# Patient Record
Sex: MALE | Race: WHITE | Employment: FULL TIME | URBAN - METROPOLITAN AREA
[De-identification: names, ages, dates, MRNs, and addresses within clinical notes are randomized per-mention and may not be internally consistent; named-entity substitution may affect disease eponyms.]

---

## 2017-04-17 ENCOUNTER — OFFICE VISIT (OUTPATIENT)
Dept: INTERNAL MEDICINE CLINIC | Age: 57
End: 2017-04-17

## 2017-04-17 VITALS
DIASTOLIC BLOOD PRESSURE: 82 MMHG | OXYGEN SATURATION: 97 % | HEIGHT: 72 IN | RESPIRATION RATE: 16 BRPM | HEART RATE: 62 BPM | SYSTOLIC BLOOD PRESSURE: 143 MMHG | WEIGHT: 296.6 LBS | TEMPERATURE: 96 F | BODY MASS INDEX: 40.17 KG/M2

## 2017-04-17 DIAGNOSIS — Z12.11 SCREENING FOR COLON CANCER: ICD-10-CM

## 2017-04-17 DIAGNOSIS — Z00.00 WELL ADULT ON ROUTINE HEALTH CHECK: Primary | ICD-10-CM

## 2017-04-17 PROBLEM — G47.30 SLEEP APNEA IN ADULT: Status: ACTIVE | Noted: 2017-04-17

## 2017-04-17 NOTE — PROGRESS NOTES
Subjective:   Robert Moulton is a 65 y.o. male presenting as a new patient for his annual checkup. No significant past medical history. He is on CPAP for a while. ROS:  Feeling well. No dyspnea or chest pain on exertion. No abdominal pain, change in bowel habits, black or bloody stools. No urinary tract or prostatic symptoms. No neurological complaints. Specific concerns today: none. .    Patient Active Problem List   Diagnosis Code    Sleep apnea in adult G47.30       No Known Allergies  History reviewed. No pertinent past medical history. Past Surgical History:   Procedure Laterality Date    HX APPENDECTOMY      HX HERNIA REPAIR      HX TONSILLECTOMY       Family History   Problem Relation Age of Onset    Lung Disease Mother     Diabetes Mother     Diabetes Father      Social History   Substance Use Topics    Smoking status: Never Smoker    Smokeless tobacco: Never Used    Alcohol use 0.6 oz/week     1 Shots of liquor per week             Objective:     Visit Vitals    /82 (BP 1 Location: Left arm, BP Patient Position: Sitting)    Pulse 62    Temp 96 °F (35.6 °C) (Oral)    Resp 16    Ht 6' (1.829 m)    Wt 296 lb 9.6 oz (134.5 kg)    SpO2 97%    BMI 40.23 kg/m2     The patient appears well, alert, oriented x 3, in no distress. ENT normal.  Neck supple. No adenopathy or thyromegaly. LIOR. Lungs are clear, good air entry, no wheezes, rhonchi or rales. S1 and S2 normal, no murmurs, regular rate and rhythm. Abdomen is soft without tenderness, guarding, mass or organomegaly.  exam: deferred. .  Extremities show no edema, normal peripheral pulses. Neurological is normal without focal findings. Assessment/Plan:   healthy adult male  lose weight, increase physical activity, follow low fat diet, follow low salt diet, continue present plan, routine labs ordered, call if any problems. ICD-10-CM ICD-9-CM    1.  Well adult on routine health check Z00.00 V70.0 PSA W/ REFLX FREE PSA UA/M W/RFLX CULTURE, COMP      CBC WITH AUTOMATED DIFF      HEMOGLOBIN A1C WITH EAG      TSH AND FREE T4      METABOLIC PANEL, COMPREHENSIVE      LIPID PANEL   2. Screening for colon cancer Z12.11 V76.51 REFERRAL FOR COLONOSCOPY     Encounter Diagnoses   Name Primary?  Well adult on routine health check Yes    Screening for colon cancer      Lizeth Manjarrez was seen today for complete physical.    Diagnoses and all orders for this visit:    Well adult on routine health check  -     PSA W/ REFLX FREE PSA  -     UA/M W/RFLX CULTURE, COMP  -     CBC WITH AUTOMATED DIFF  -     HEMOGLOBIN A1C WITH EAG  -     TSH AND FREE T4  -     METABOLIC PANEL, COMPREHENSIVE  -     LIPID PANEL    Screening for colon cancer  -     REFERRAL FOR COLONOSCOPY      Follow-up Disposition: Not on File  reviewed diet, exercise and weight control.

## 2017-04-18 LAB
ALBUMIN SERPL-MCNC: 4.6 G/DL (ref 3.5–5.5)
ALBUMIN/GLOB SERPL: 1.8 {RATIO} (ref 1.2–2.2)
ALP SERPL-CCNC: 66 IU/L (ref 39–117)
ALT SERPL-CCNC: 49 IU/L (ref 0–44)
APPEARANCE UR: CLEAR
AST SERPL-CCNC: 42 IU/L (ref 0–40)
BACTERIA #/AREA URNS HPF: NORMAL /[HPF]
BASOPHILS # BLD AUTO: 0 X10E3/UL (ref 0–0.2)
BASOPHILS NFR BLD AUTO: 0 %
BILIRUB SERPL-MCNC: 0.7 MG/DL (ref 0–1.2)
BILIRUB UR QL STRIP: NEGATIVE
BUN SERPL-MCNC: 13 MG/DL (ref 6–24)
BUN/CREAT SERPL: 14 (ref 9–20)
CALCIUM SERPL-MCNC: 9.5 MG/DL (ref 8.7–10.2)
CASTS URNS QL MICRO: NORMAL /LPF
CHLORIDE SERPL-SCNC: 99 MMOL/L (ref 96–106)
CHOLEST SERPL-MCNC: 194 MG/DL (ref 100–199)
CO2 SERPL-SCNC: 20 MMOL/L (ref 18–29)
COLOR UR: YELLOW
CREAT SERPL-MCNC: 0.91 MG/DL (ref 0.76–1.27)
EOSINOPHIL # BLD AUTO: 0.1 X10E3/UL (ref 0–0.4)
EOSINOPHIL NFR BLD AUTO: 2 %
EPI CELLS #/AREA URNS HPF: NORMAL /HPF
ERYTHROCYTE [DISTWIDTH] IN BLOOD BY AUTOMATED COUNT: 13.1 % (ref 12.3–15.4)
EST. AVERAGE GLUCOSE BLD GHB EST-MCNC: 126 MG/DL
GLOBULIN SER CALC-MCNC: 2.5 G/DL (ref 1.5–4.5)
GLUCOSE SERPL-MCNC: 95 MG/DL (ref 65–99)
GLUCOSE UR QL: NEGATIVE
HBA1C MFR BLD: 6 % (ref 4.8–5.6)
HCT VFR BLD AUTO: 43.2 % (ref 37.5–51)
HDLC SERPL-MCNC: 40 MG/DL
HGB BLD-MCNC: 14.8 G/DL (ref 12.6–17.7)
HGB UR QL STRIP: NEGATIVE
IMM GRANULOCYTES # BLD: 0 X10E3/UL (ref 0–0.1)
IMM GRANULOCYTES NFR BLD: 0 %
INTERPRETATION, 910389: NORMAL
KETONES UR QL STRIP: NEGATIVE
LDLC SERPL CALC-MCNC: 110 MG/DL (ref 0–99)
LEUKOCYTE ESTERASE UR QL STRIP: NEGATIVE
LYMPHOCYTES # BLD AUTO: 2.1 X10E3/UL (ref 0.7–3.1)
LYMPHOCYTES NFR BLD AUTO: 33 %
MCH RBC QN AUTO: 31.6 PG (ref 26.6–33)
MCHC RBC AUTO-ENTMCNC: 34.3 G/DL (ref 31.5–35.7)
MCV RBC AUTO: 92 FL (ref 79–97)
MICRO URNS: NORMAL
MICRO URNS: NORMAL
MONOCYTES # BLD AUTO: 0.3 X10E3/UL (ref 0.1–0.9)
MONOCYTES NFR BLD AUTO: 5 %
NEUTROPHILS # BLD AUTO: 3.8 X10E3/UL (ref 1.4–7)
NEUTROPHILS NFR BLD AUTO: 60 %
NITRITE UR QL STRIP: NEGATIVE
PH UR STRIP: 6 [PH] (ref 5–7.5)
PLATELET # BLD AUTO: 170 X10E3/UL (ref 150–379)
POTASSIUM SERPL-SCNC: 4.4 MMOL/L (ref 3.5–5.2)
PROT SERPL-MCNC: 7.1 G/DL (ref 6–8.5)
PROT UR QL STRIP: NEGATIVE
PSA SERPL-MCNC: 1.9 NG/ML (ref 0–4)
RBC # BLD AUTO: 4.68 X10E6/UL (ref 4.14–5.8)
RBC #/AREA URNS HPF: NORMAL /HPF
REFLEX CRITERIA: NORMAL
SODIUM SERPL-SCNC: 141 MMOL/L (ref 134–144)
SP GR UR: 1 (ref 1–1.03)
T4 FREE SERPL-MCNC: 1.03 NG/DL (ref 0.82–1.77)
TRIGL SERPL-MCNC: 222 MG/DL (ref 0–149)
TSH SERPL DL<=0.005 MIU/L-ACNC: 1.78 UIU/ML (ref 0.45–4.5)
URINALYSIS REFLEX , 377201: NORMAL
UROBILINOGEN UR STRIP-MCNC: 0.2 MG/DL (ref 0.2–1)
VLDLC SERPL CALC-MCNC: 44 MG/DL (ref 5–40)
WBC # BLD AUTO: 6.4 X10E3/UL (ref 3.4–10.8)
WBC #/AREA URNS HPF: NORMAL /HPF

## 2017-04-20 NOTE — PROGRESS NOTES
Please mail the letter:     1. Prostate enzyme, CBC, kidney,  thyroid, UA level is within normal range. 2. Liver enzymes are slightly elevated, not concerning. Please work on exercise and loosing weight. 3. Total cholesterol level is normal but the bad cholesterol level and triglyceride level mildly elevated. No need to start any medication at this point. Continue watching your diet, eat healthy, less bradley and fatty food, more baked or grilled or broiled food. Exercise 150 minutes/week will be helpful as well. Will recheck level in 6 months. 4. Prediabetes: Average blood sugar( Hg A1c ) is 6.0, in the range of prediabetic level. Prediabetes is a warning sign that you are at risk for getting type 2 diabetes. It means that your blood sugar is higher than it should be. Most people who get type 2 diabetes have prediabetes first. The good news is that lifestyle changes may help you get your blood sugar back to normal and avoid or delay diabetes.   Will recheck this level in 6 months

## 2018-03-02 ENCOUNTER — OFFICE VISIT (OUTPATIENT)
Dept: INTERNAL MEDICINE CLINIC | Age: 58
End: 2018-03-02

## 2018-03-02 VITALS
HEART RATE: 71 BPM | HEIGHT: 72 IN | WEIGHT: 305.8 LBS | SYSTOLIC BLOOD PRESSURE: 120 MMHG | DIASTOLIC BLOOD PRESSURE: 77 MMHG | TEMPERATURE: 96.3 F | BODY MASS INDEX: 41.42 KG/M2 | RESPIRATION RATE: 18 BRPM | OXYGEN SATURATION: 97 %

## 2018-03-02 DIAGNOSIS — E66.01 OBESITY, MORBID (HCC): ICD-10-CM

## 2018-03-02 DIAGNOSIS — Z71.89 ADVANCE DIRECTIVE DISCUSSED WITH PATIENT: ICD-10-CM

## 2018-03-02 DIAGNOSIS — G47.30 SLEEP APNEA IN ADULT: ICD-10-CM

## 2018-03-02 DIAGNOSIS — R74.8 ELEVATED LIVER ENZYMES: ICD-10-CM

## 2018-03-02 DIAGNOSIS — J34.2 DNS (DEVIATED NASAL SEPTUM): ICD-10-CM

## 2018-03-02 DIAGNOSIS — R73.03 PREDIABETES: Primary | ICD-10-CM

## 2018-03-02 NOTE — PATIENT INSTRUCTIONS
Body Mass Index: Care Instructions  Your Care Instructions    Body mass index (BMI) can help you see if your weight is raising your risk for health problems. It uses a formula to compare how much you weigh with how tall you are. · A BMI lower than 18.5 is considered underweight. · A BMI between 18.5 and 24.9 is considered healthy. · A BMI between 25 and 29.9 is considered overweight. A BMI of 30 or higher is considered obese. If your BMI is in the normal range, it means that you have a lower risk for weight-related health problems. If your BMI is in the overweight or obese range, you may be at increased risk for weight-related health problems, such as high blood pressure, heart disease, stroke, arthritis or joint pain, and diabetes. If your BMI is in the underweight range, you may be at increased risk for health problems such as fatigue, lower protection (immunity) against illness, muscle loss, bone loss, hair loss, and hormone problems. BMI is just one measure of your risk for weight-related health problems. You may be at higher risk for health problems if you are not active, you eat an unhealthy diet, or you drink too much alcohol or use tobacco products. Follow-up care is a key part of your treatment and safety. Be sure to make and go to all appointments, and call your doctor if you are having problems. It's also a good idea to know your test results and keep a list of the medicines you take. How can you care for yourself at home? · Practice healthy eating habits. This includes eating plenty of fruits, vegetables, whole grains, lean protein, and low-fat dairy. · If your doctor recommends it, get more exercise. Walking is a good choice. Bit by bit, increase the amount you walk every day. Try for at least 30 minutes on most days of the week. · Do not smoke. Smoking can increase your risk for health problems. If you need help quitting, talk to your doctor about stop-smoking programs and medicines. These can increase your chances of quitting for good. · Limit alcohol to 2 drinks a day for men and 1 drink a day for women. Too much alcohol can cause health problems. If you have a BMI higher than 25  · Your doctor may do other tests to check your risk for weight-related health problems. This may include measuring the distance around your waist. A waist measurement of more than 40 inches in men or 35 inches in women can increase the risk of weight-related health problems. · Talk with your doctor about steps you can take to stay healthy or improve your health. You may need to make lifestyle changes to lose weight and stay healthy, such as changing your diet and getting regular exercise. If you have a BMI lower than 18.5  · Your doctor may do other tests to check your risk for health problems. · Talk with your doctor about steps you can take to stay healthy or improve your health. You may need to make lifestyle changes to gain or maintain weight and stay healthy, such as getting more healthy foods in your diet and doing exercises to build muscle. Where can you learn more? Go to http://dany-miah.info/. Enter S176 in the search box to learn more about \"Body Mass Index: Care Instructions. \"  Current as of: October 13, 2016  Content Version: 11.4  © 8913-5498 [employer]. Care instructions adapted under license by [employer] (which disclaims liability or warranty for this information). If you have questions about a medical condition or this instruction, always ask your healthcare professional. [address] any warranty or liability for your use of this information. Body Mass Index: Care Instructions  Your Care Instructions    Body mass index (BMI) can help you see if your weight is raising your risk for health problems. It uses a formula to compare how much you weigh with how tall you are.   · A BMI lower than 18.5 is considered underweight. · A BMI between 18.5 and 24.9 is considered healthy. · A BMI between 25 and 29.9 is considered overweight. A BMI of 30 or higher is considered obese. If your BMI is in the normal range, it means that you have a lower risk for weight-related health problems. If your BMI is in the overweight or obese range, you may be at increased risk for weight-related health problems, such as high blood pressure, heart disease, stroke, arthritis or joint pain, and diabetes. If your BMI is in the underweight range, you may be at increased risk for health problems such as fatigue, lower protection (immunity) against illness, muscle loss, bone loss, hair loss, and hormone problems. BMI is just one measure of your risk for weight-related health problems. You may be at higher risk for health problems if you are not active, you eat an unhealthy diet, or you drink too much alcohol or use tobacco products. Follow-up care is a key part of your treatment and safety. Be sure to make and go to all appointments, and call your doctor if you are having problems. It's also a good idea to know your test results and keep a list of the medicines you take. How can you care for yourself at home? · Practice healthy eating habits. This includes eating plenty of fruits, vegetables, whole grains, lean protein, and low-fat dairy. · If your doctor recommends it, get more exercise. Walking is a good choice. Bit by bit, increase the amount you walk every day. Try for at least 30 minutes on most days of the week. · Do not smoke. Smoking can increase your risk for health problems. If you need help quitting, talk to your doctor about stop-smoking programs and medicines. These can increase your chances of quitting for good. · Limit alcohol to 2 drinks a day for men and 1 drink a day for women. Too much alcohol can cause health problems.   If you have a BMI higher than 25  · Your doctor may do other tests to check your risk for weight-related health problems. This may include measuring the distance around your waist. A waist measurement of more than 40 inches in men or 35 inches in women can increase the risk of weight-related health problems. · Talk with your doctor about steps you can take to stay healthy or improve your health. You may need to make lifestyle changes to lose weight and stay healthy, such as changing your diet and getting regular exercise. If you have a BMI lower than 18.5  · Your doctor may do other tests to check your risk for health problems. · Talk with your doctor about steps you can take to stay healthy or improve your health. You may need to make lifestyle changes to gain or maintain weight and stay healthy, such as getting more healthy foods in your diet and doing exercises to build muscle. Where can you learn more? Go to http://dany-miah.info/. Enter S176 in the search box to learn more about \"Body Mass Index: Care Instructions. \"  Current as of: October 13, 2016  Content Version: 11.4  © 0246-5903 [employer]. Care instructions adapted under license by [employer] (which disclaims liability or warranty for this information). If you have questions about a medical condition or this instruction, always ask your healthcare professional. [address] any warranty or liability for your use of this information.

## 2018-03-02 NOTE — LETTER
3/5/2018 3:56 PM 
 
Mr. Flora Rosa  [address] [place] [address] Dear Flora Sullivan: Please find your most recent results below. Resulted Orders HEMOGLOBIN A1C WITH EAG Result Value Ref Range Hemoglobin A1c 6.1 (H) 4.8 - 5.6 % Comment:  
            Pre-diabetes: 5.7 - 6.4 Diabetes: >6.4 Glycemic control for adults with diabetes: <7.0 Estimated average glucose 128 mg/dL Narrative Performed at:  [address]  [id number] : Aylin Samayoa MD, Phone:  [phone] HEPATIC FUNCTION PANEL Result Value Ref Range Protein, total 7.0 6.0 - 8.5 g/dL Albumin 4.3 3.5 - 5.5 g/dL Bilirubin, total 0.5 0.0 - 1.2 mg/dL Bilirubin, direct 0.15 0.00 - 0.40 mg/dL Alk. phosphatase 74 39 - 117 IU/L  
 AST (SGOT) 31 0 - 40 IU/L  
 ALT (SGPT) 39 0 - 44 IU/L Narrative Performed at:  [address]  [id number] : Aylin Samayoa MD, Phone:  [phone] RECOMMENDATIONS: 
 
1. Liver function is back to normal.  
 
2. Average blood sugar level is slightly up from last time. Continue work on diet and exercise as we have discussed Please call me if you have any questions: [phone] Sincerely, 
 
 
Denise Galindo MD

## 2018-03-02 NOTE — PROGRESS NOTES
Subjective:     Chief Complaint   Patient presents with    Cholesterol Problem    High Blood Sugar        He  is a 63y.o. year old male with no significant past medical history except sleep apnea who presents today for follow up on blood work done last year in 4/2017. Last a1c was 6.0. Cholesterol level was slightly elevated . Unfortunately he gained 9 pounds in 11 months. States that because of his work schedule and as  its very difficult to follow healthy lifestyle. Has been using CPAP on a regular basis. Denies any chest pain, soa, cough. Reports that he is a mouth breather. Hard to sleep through nose. Pertinent items are noted in HPI. Objective:     Vitals:    03/02/18 0815   BP: 120/77   Pulse: 71   Resp: 18   Temp: 96.3 °F (35.7 °C)   TempSrc: Temporal   SpO2: 97%   Weight: 305 lb 12.8 oz (138.7 kg)   Height: 6' (1.829 m)       Physical Examination: General appearance - alert, well appearing, and in no distress, oriented to person, place, and time and overweight  Mental status - alert, oriented to person, place, and time, normal mood, behavior, speech, dress, motor activity, and thought processes  Ears - bilateral TM's and external ear canals normal  Nose - normal and patent, no erythema, discharge or polyps and septal deviation to left.    Mouth - mucous membranes moist, pharynx normal without lesions  Neck - supple, no significant adenopathy  Chest - clear to auscultation, no wheezes, rales or rhonchi, symmetric air entry  Heart - normal rate, regular rhythm, normal S1, S2, no murmurs, rubs, clicks or gallops    No Known Allergies   Social History     Social History    Marital status:      Spouse name: N/A    Number of children: N/A    Years of education: N/A     Social History Main Topics    Smoking status: Never Smoker    Smokeless tobacco: Never Used    Alcohol use 0.6 oz/week     1 Shots of liquor per week    Drug use: No    Sexual activity: Not Asked     Other Topics Concern    None     Social History Narrative      Family History   Problem Relation Age of Onset    Lung Disease Mother     Diabetes Mother     Diabetes Father       Past Surgical History:   Procedure Laterality Date    HX APPENDECTOMY      HX HERNIA REPAIR      HX TONSILLECTOMY        History reviewed. No pertinent past medical history. Current Outpatient Prescriptions   Medication Sig Dispense Refill    DM/PSEUDOEPHED/ACETAMINOPHEN (VICKS DAYQUIL PO) Take  by mouth. Assessment/ Plan:   Diagnoses and all orders for this visit:    1. Prediabetes  -     HEMOGLOBIN A1C WITH EAG        - strongly encouraged to work on healthy diet and exercise. 2. Elevated liver enzymes  -     HEPATIC FUNCTION PANEL    3. Obesity, morbid ([address]  Discussed the patient's BMI with him. The BMI follow up plan is as follows:     dietary management education, guidance, and counseling  encourage exercise  monitor weight  prescribed dietary intake      4. DNS (deviated nasal septum)  -   Left nostril is barely open. Advised to have a follow up with ENT. -    REFERRAL TO ENT-OTOLARYNGOLOGY    5. Sleep apnea in adult  -     REFERRAL TO ENT-OTOLARYNGOLOGY    6. Advance directive discussed with patient  -     Refer to ACP. -    FULL CODE           Medication risks/benefits/costs/interactions/alternatives discussed with patient. Advised patient to call back or return to office if symptoms worsen/change/persist. If patient cannot reach us or should anything more severe/urgent arise he/she should proceed directly to the nearest emergency department. Discussed expected course/resolution/complications of diagnosis in detail with patient. Patient given a written after visit summary which includes her diagnoses, current medications and vitals. Patient expressed understanding with the diagnosis and plan.        Follow-up Disposition:  Return in about 7 weeks (around 4/20/2018) for complete physical  and fasting blood work.. An After Visit Summary was printed and given to the patient.

## 2018-03-02 NOTE — ACP (ADVANCE CARE PLANNING)
Advance Care Planning (ACP) Provider Conversation Snapshot    Date of ACP Conversation: 03/02/18  Persons included in Conversation:  patient  Length of ACP Conversation in minutes:  <16 minutes (Non-Billable)    Authorized Decision Maker (if patient is incapable of making informed decisions): This person is: Other Legally Authorized Decision Maker (e.g. Next of Kin)- His wife. For Patients with Decision Making Capacity:   Values/Goals: Exploration of values, goals, and preferences if recovery is not expected, even with continued medical treatment in the event of:  Imminent death  Severe, permanent brain injury  \"In these circumstances, what matters most to you? \"  Care focused more on comfort or quality of life. Conversation Outcomes / Follow-Up Plan:   Recommended completion of Advance Directive form after review of ACP materials and conversation with prospective healthcare agent   Recommended communicating the plan and making copies for the healthcare agent, personal physician, and others as appropriate (e.g., health system)  Recommended review of completed ACP document annually or upon change in health status  Other Treatment or Goals of Care Decisions:    Patient is full code but if there is imminent death or permanent brain injury he wish not to be in life support at all.

## 2018-03-02 NOTE — MR AVS SNAPSHOT
[facility]. Janae Ambrose 26 [facility] 13 
[phone] Patient: Abby Avendaño MRN: [id number] Visit Information Date & Time Provider Department Dept. Phone Encounter #  
 3/2/2018  8:30 AM Juan Jose Carbajal MD [facility] [phone] [id number] Follow-up Instructions Return in about 7 weeks (around 4/20/2018) for complete physical  and fasting blood work. Alma Rojas Upcoming Health Maintenance Date Due Hepatitis C Screening 1960 DTaP/Tdap/Td series (1 - Tdap) 1/20/1982 Influenza Age 5 to Adult 8/1/2017 COLONOSCOPY 11/15/2027 Allergies as of 3/2/2018  Review Complete On: 3/2/2018 By: Juan Jose Carbajal MD  
 No Known Allergies Current Immunizations  Never Reviewed No immunizations on file. Not reviewed this visit You Were Diagnosed With   
  
 Codes Comments Prediabetes    -  Primary ICD-10-CM: R73.03 
ICD-9-CM: 790.29 Elevated liver enzymes     ICD-10-CM: R74.8 ICD-9-CM: 790.5 Obesity, morbid ([address]     ICD-10-CM: E66.01 
ICD-9-CM: 278.01   
 DNS (deviated nasal septum)     ICD-10-CM: J34.2 ICD-9-CM: 875 Sleep apnea in adult     ICD-10-CM: G47.30 ICD-9-CM: 327.23 Vitals BP Pulse Temp Resp Height(growth percentile) 120/77 (BP 1 Location: Left arm, BP Patient Position: Sitting) 71 96.3 °F (35.7 °C) (Temporal) 18 6' (1.829 m) Weight(growth percentile) SpO2 BMI Smoking Status 305 lb 12.8 oz (138.7 kg) 97% 41.47 kg/m2 Never Smoker Vitals History BMI and BSA Data Body Mass Index Body Surface Area  
 41.47 kg/m 2 2.65 m 2 Preferred Pharmacy Pharmacy Name Phone [address] [phone] Your Updated Medication List  
  
   
This list is accurate as of 3/2/18  8:43 AM.  Always use your most recent med list.  
  
  
  
  
 Gus Perkins PO Take  by mouth. We Performed the Following HEMOGLOBIN A1C WITH EAG [86835 CPT(R)] HEPATIC FUNCTION PANEL [28810 CPT(R)] REFERRAL TO ENT-OTOLARYNGOLOGY [GSU60 Custom] Follow-up Instructions Return in about 7 weeks (around 4/20/2018) for complete physical  and fasting blood work. Suresh Spence Referral Information Referral ID Referred By Referred To  
  
 [id number] KADIE CARRINGTON [facility] Elena Parra [address] [place] [address] Fax: [phone] Visits Status Start Date End Date 1 New Request 3/2/18 3/2/19 If your referral has a status of pending review or denied, additional information will be sent to support the outcome of this decision. Patient Instructions Body Mass Index: Care Instructions Your Care Instructions Body mass index (BMI) can help you see if your weight is raising your risk for health problems. It uses a formula to compare how much you weigh with how tall you are. · A BMI lower than 18.5 is considered underweight. · A BMI between 18.5 and 24.9 is considered healthy. · A BMI between 25 and 29.9 is considered overweight. A BMI of 30 or higher is considered obese. If your BMI is in the normal range, it means that you have a lower risk for weight-related health problems. If your BMI is in the overweight or obese range, you may be at increased risk for weight-related health problems, such as high blood pressure, heart disease, stroke, arthritis or joint pain, and diabetes. If your BMI is in the underweight range, you may be at increased risk for health problems such as fatigue, lower protection (immunity) against illness, muscle loss, bone loss, hair loss, and hormone problems. BMI is just one measure of your risk for weight-related health problems. You may be at higher risk for health problems if you are not active, you eat an unhealthy diet, or you drink too much alcohol or use tobacco products. Follow-up care is a key part of your treatment and safety. Be sure to make and go to all appointments, and call your doctor if you are having problems. It's also a good idea to know your test results and keep a list of the medicines you take. How can you care for yourself at home? · Practice healthy eating habits. This includes eating plenty of fruits, vegetables, whole grains, lean protein, and low-fat dairy. · If your doctor recommends it, get more exercise. Walking is a good choice. Bit by bit, increase the amount you walk every day. Try for at least 30 minutes on most days of the week. · Do not smoke. Smoking can increase your risk for health problems. If you need help quitting, talk to your doctor about stop-smoking programs and medicines. These can increase your chances of quitting for good. · Limit alcohol to 2 drinks a day for men and 1 drink a day for women. Too much alcohol can cause health problems. If you have a BMI higher than 25 · Your doctor may do other tests to check your risk for weight-related health problems. This may include measuring the distance around your waist. A waist measurement of more than 40 inches in men or 35 inches in women can increase the risk of weight-related health problems. · Talk with your doctor about steps you can take to stay healthy or improve your health. You may need to make lifestyle changes to lose weight and stay healthy, such as changing your diet and getting regular exercise. If you have a BMI lower than 18.5 · Your doctor may do other tests to check your risk for health problems. · Talk with your doctor about steps you can take to stay healthy or improve your health. You may need to make lifestyle changes to gain or maintain weight and stay healthy, such as getting more healthy foods in your diet and doing exercises to build muscle. Where can you learn more? Go to http://dany-miah.info/. Enter S176 in the search box to learn more about \"Body Mass Index: Care Instructions. \" Current as of: October 13, 2016 Content Version: 11.4 © 9509-3285 [employer]. Care instructions adapted under license by [employer] (which disclaims liability or warranty for this information). If you have questions about a medical condition or this instruction, always ask your healthcare professional. [facility] [address] any warranty or liability for your use of this information. Body Mass Index: Care Instructions Your Care Instructions Body mass index (BMI) can help you see if your weight is raising your risk for health problems. It uses a formula to compare how much you weigh with how tall you are. · A BMI lower than 18.5 is considered underweight. · A BMI between 18.5 and 24.9 is considered healthy. · A BMI between 25 and 29.9 is considered overweight. A BMI of 30 or higher is considered obese. If your BMI is in the normal range, it means that you have a lower risk for weight-related health problems. If your BMI is in the overweight or obese range, you may be at increased risk for weight-related health problems, such as high blood pressure, heart disease, stroke, arthritis or joint pain, and diabetes. If your BMI is in the underweight range, you may be at increased risk for health problems such as fatigue, lower protection (immunity) against illness, muscle loss, bone loss, hair loss, and hormone problems. BMI is just one measure of your risk for weight-related health problems. You may be at higher risk for health problems if you are not active, you eat an unhealthy diet, or you drink too much alcohol or use tobacco products. Follow-up care is a key part of your treatment and safety. Be sure to make and go to all appointments, and call your doctor if you are having problems. It's also a good idea to know your test results and keep a list of the medicines you take. How can you care for yourself at home? · Practice healthy eating habits. This includes eating plenty of fruits, vegetables, whole grains, lean protein, and low-fat dairy. · If your doctor recommends it, get more exercise. Walking is a good choice. Bit by bit, increase the amount you walk every day. Try for at least 30 minutes on most days of the week. · Do not smoke. Smoking can increase your risk for health problems. If you need help quitting, talk to your doctor about stop-smoking programs and medicines. These can increase your chances of quitting for good. · Limit alcohol to 2 drinks a day for men and 1 drink a day for women. Too much alcohol can cause health problems. If you have a BMI higher than 25 · Your doctor may do other tests to check your risk for weight-related health problems. This may include measuring the distance around your waist. A waist measurement of more than 40 inches in men or 35 inches in women can increase the risk of weight-related health problems. · Talk with your doctor about steps you can take to stay healthy or improve your health. You may need to make lifestyle changes to lose weight and stay healthy, such as changing your diet and getting regular exercise. If you have a BMI lower than 18.5 · Your doctor may do other tests to check your risk for health problems. · Talk with your doctor about steps you can take to stay healthy or improve your health. You may need to make lifestyle changes to gain or maintain weight and stay healthy, such as getting more healthy foods in your diet and doing exercises to build muscle. Where can you learn more? Go to http://dany-miah.info/. Enter S176 in the search box to learn more about \"Body Mass Index: Care Instructions. \" Current as of: October 13, 2016 Content Version: 11.4 © 9270-5443 [employer].  Care instructions adapted under license by [address] (which disclaims liability or warranty for this information). If you have questions about a medical condition or this instruction, always ask your healthcare professional. [address] any warranty or liability for your use of this information. Introducing hospitals & HEALTH SERVICES! Elsie Velazquezchrist introduces [employer] patient portal. Now you can access parts of your medical record, email your doctor's office, and request medication refills online. 1. In your internet browser, go to [email or web address] 2. Click on the First Time User? Click Here link in the Sign In box. You will see the New Member Sign Up page. 3. Enter your [employer] Access Code exactly as it appears below. You will not need to use this code after youve completed the sign-up process. If you do not sign up before the expiration date, you must request a new code. · [employer] Access Code: [email or web address] Expires: 5/31/2018  8:15 AM 
 
4. Enter the last four digits of your Social Security Number (xxxx) and Date of Birth (mm/dd/yyyy) as indicated and click Submit. You will be taken to the next sign-up page. 5. Create a [employer] ID. This will be your [employer] login ID and cannot be changed, so think of one that is secure and easy to remember. 6. Create a [employer] password. You can change your password at any time. 7. Enter your Password Reset Question and Answer. This can be used at a later time if you forget your password. 8. Enter your e-mail address. You will receive e-mail notification when new information is available in [address] 9. Click Sign Up. You can now view and download portions of your medical record. 10. Click the Download Summary menu link to download a portable copy of your medical information. If you have questions, please visit the Frequently Asked Questions section of the [employer] website.  Remember, [employer] is NOT to be used for urgent needs. For medical emergencies, dial 911. Now available from your iPhone and Android! Please provide this summary of care documentation to your next provider. If you have any questions after today's visit, please call [phone].

## 2018-03-03 LAB
ALBUMIN SERPL-MCNC: 4.3 G/DL (ref 3.5–5.5)
ALP SERPL-CCNC: 74 IU/L (ref 39–117)
ALT SERPL-CCNC: 39 IU/L (ref 0–44)
AST SERPL-CCNC: 31 IU/L (ref 0–40)
BILIRUB DIRECT SERPL-MCNC: 0.15 MG/DL (ref 0–0.4)
BILIRUB SERPL-MCNC: 0.5 MG/DL (ref 0–1.2)
EST. AVERAGE GLUCOSE BLD GHB EST-MCNC: 128 MG/DL
HBA1C MFR BLD: 6.1 % (ref 4.8–5.6)
PROT SERPL-MCNC: 7 G/DL (ref 6–8.5)

## 2018-03-04 NOTE — ACP (ADVANCE CARE PLANNING)
Advance Care Planning (ACP) Provider Conversation Snapshot    Date of ACP Conversation: 03/02/18  Persons included in Conversation:  patient  Length of ACP Conversation in minutes:  <16 minutes (Non-Billable)    Authorized Decision Maker (if patient is incapable of making informed decisions): This person is:   his wife          For Patients with Decision Making Capacity:   Values/Goals: Exploration of values, goals, and preferences if recovery is not expected, even with continued medical treatment in the event of:  Imminent death  Severe, permanent brain injury  \"In these circumstances, what matters most to you? \"  Care focused more on comfort or quality of life. Conversation Outcomes / Follow-Up Plan:   Recommended completion of Advance Directive form after review of ACP materials and conversation with prospective healthcare agent   Recommended communicating the plan and making copies for the healthcare agent, personal physician, and others as appropriate (e.g., health system)  Recommended review of completed ACP document annually or upon change in health status    He clearly states that if there is imminent death and  permanent brain damage he does not want to start any life support.

## 2018-03-05 NOTE — PROGRESS NOTES
Please mail letter:    1. Liver function is back to normal.    2. Average blood sugar level is slightly up from last time. Continue work on diet and exercise as we have discussed.

## 2018-04-18 ENCOUNTER — OFFICE VISIT (OUTPATIENT)
Dept: INTERNAL MEDICINE CLINIC | Age: 58
End: 2018-04-18

## 2018-04-18 VITALS
BODY MASS INDEX: 41.34 KG/M2 | HEIGHT: 72 IN | TEMPERATURE: 96.8 F | RESPIRATION RATE: 18 BRPM | OXYGEN SATURATION: 99 % | DIASTOLIC BLOOD PRESSURE: 79 MMHG | SYSTOLIC BLOOD PRESSURE: 128 MMHG | HEART RATE: 63 BPM | WEIGHT: 305.2 LBS

## 2018-04-18 DIAGNOSIS — G47.30 SLEEP APNEA IN ADULT: ICD-10-CM

## 2018-04-18 DIAGNOSIS — E66.01 OBESITY, MORBID (HCC): ICD-10-CM

## 2018-04-18 DIAGNOSIS — Z00.00 WELL ADULT ON ROUTINE HEALTH CHECK: Primary | ICD-10-CM

## 2018-04-18 DIAGNOSIS — Z11.59 ENCOUNTER FOR HEPATITIS C SCREENING TEST FOR LOW RISK PATIENT: ICD-10-CM

## 2018-04-18 RX ORDER — BISMUTH SUBSALICYLATE 262 MG
1 TABLET,CHEWABLE ORAL DAILY
COMMUNITY

## 2018-04-18 NOTE — PROGRESS NOTES
Subjective:   Ivan Polo is a 63 y.o. male presenting for his annual checkup. Has been using CPAP regularly. ROS:  Feeling well. No dyspnea or chest pain on exertion. No abdominal pain, change in bowel habits, black or bloody stools. No urinary tract or prostatic symptoms. No neurological complaints. Specific concerns today: none. Patient Active Problem List   Diagnosis Code    Sleep apnea in adult G47.30    [facility] E66.01     Current Outpatient Prescriptions   Medication Sig Dispense Refill    multivitamin (ONE A DAY) tablet Take 1 Tab by mouth daily. No Known Allergies  No past medical history on file. Social History   Substance Use Topics    Smoking status: Never Smoker    Smokeless tobacco: Never Used    Alcohol use 0.6 oz/week     1 Shots of liquor per week             Objective:     Visit Vitals    /79 (BP 1 Location: Left arm, BP Patient Position: Sitting)    Pulse 63    Temp 96.8 °F (36 °C) (Temporal)    Resp 18    Ht 6' (1.829 m)    Wt 305 lb 3.2 oz (138.4 kg)    SpO2 99%    BMI 41.39 kg/m2     The patient appears well, alert, oriented x 3, in no distress. ENT normal.  Neck supple. No adenopathy or thyromegaly. LIOR. Lungs are clear, good air entry, no wheezes, rhonchi or rales. S1 and S2 normal, no murmurs, regular rate and rhythm. Abdomen is soft without tenderness, guarding, mass or organomegaly.  exam: deferred. Extremities show no edema, normal peripheral pulses. Neurological is normal without focal findings. Assessment/Plan:   healthy adult male  lose weight, increase physical activity, follow low fat diet, follow low salt diet, routine labs ordered, call if any problems. ICD-10-CM ICD-9-CM    1.  Well adult on routine health check Z00.00 V70.0 multivitamin (ONE A DAY) tablet      CBC WITH AUTOMATED DIFF      METABOLIC PANEL, COMPREHENSIVE      TSH AND FREE T4      LIPID PANEL      PSA W/ REFLX FREE PSA      HEMOGLOBIN A1C WITH EAG 2. Sleep apnea in adult G47.30 327.23    3. Obesity, morbid ([facility] E66.01 278.01    4. Encounter for hepatitis C screening test for low risk patient Z11.59 V73.89 HEPATITIS C AB     Diagnoses and all orders for this visit:    1. Well adult on routine health check  -     CBC WITH AUTOMATED DIFF  -     METABOLIC PANEL, COMPREHENSIVE  -     TSH AND FREE T4  -     LIPID PANEL  -     PSA W/ REFLX FREE PSA  -     HEMOGLOBIN A1C WITH EAG    2. Sleep apnea in adult      - continue CPAP. 3. Obesity, morbid ([facility]      Counseled on diet and exercise. 4. Encounter for hepatitis C screening test for low risk patient  -     HEPATITIS C AB      Follow-up Disposition:  Return in about 1 year (around 4/18/2019), or if symptoms worsen or fail to improve. reviewed diet, exercise and weight control  cardiovascular risk and specific lipid/LDL goals reviewed.

## 2018-04-18 NOTE — MR AVS SNAPSHOT
[address] [place] [address]. Janae Ambrose [address] [place] [address] 
[phone] Patient: Macario Todd MRN: [id number] EAK:[birth year] Visit Information Date & Time Provider Department Dept. Phone Encounter #  
 4/18/2018  8:30 AM Enmanuel Duffy MD [facility] Internal Medicine [phone] [id number] Follow-up Instructions Return in about 1 year (around 4/18/2019), or if symptoms worsen or fail to improve. Upcoming Health Maintenance Date Due Hepatitis C Screening 1960 COLONOSCOPY 11/15/2027 DTaP/Tdap/Td series (2 - Td) 3/2/2028 Allergies as of 4/18/2018  Review Complete On: 4/18/2018 By: Juan Jose Carbajal MD  
 No Known Allergies Current Immunizations  Reviewed on 3/2/2018 No immunizations on file. Not reviewed this visit You Were Diagnosed With   
  
 Codes Comments Well adult on routine health check    -  Primary ICD-10-CM: Z00.00 ICD-9-CM: V70.0 Sleep apnea in adult     ICD-10-CM: G47.30 ICD-9-CM: 327.23 Obesity, morbid ([facility]     ICD-10-CM: E66.01 
ICD-9-CM: 278.01 Encounter for hepatitis C screening test for low risk patient     ICD-10-CM: Z11.59 
ICD-9-CM: V73.89 Vitals BP Pulse Temp Resp Height(growth percentile) Weight(growth percentile) 128/79 (BP 1 Location: Left arm, BP Patient Position: Sitting) 63 96.8 °F (36 °C) (Temporal) 18 6' (1.829 m) 305 lb 3.2 oz (138.4 kg) SpO2 BMI Smoking Status 99% 41.39 kg/m2 Never Smoker Vitals History BMI and BSA Data Body Mass Index Body Surface Area  
 41.39 kg/m 2 2.65 m 2 Preferred Pharmacy Pharmacy Name Phone [address] [facility] [address] [phone] Your Updated Medication List  
  
   
This list is accurate as of 4/18/18  9:23 AM.  Always use your most recent med list.  
  
  
  
  
 multivitamin tablet Commonly known as:  ONE A DAY Take 1 Tab by mouth daily. We Performed the Following CBC WITH AUTOMATED DIFF [26592 CPT(R)] HEMOGLOBIN A1C WITH EAG [68931 CPT(R)] HEPATITIS C AB [92411 CPT(R)] LIPID PANEL [02269 CPT(R)] METABOLIC PANEL, COMPREHENSIVE [77573 CPT(R)] PSA W/ REFLX FREE PSA [13800 CPT(R)] TSH AND FREE T4 [34626 CPT(R)] Follow-up Instructions Return in about 1 year (around 4/18/2019), or if symptoms worsen or fail to improve. Patient Instructions Body Mass Index: Care Instructions Your Care Instructions Body mass index (BMI) can help you see if your weight is raising your risk for health problems. It uses a formula to compare how much you weigh with how tall you are. · A BMI lower than 18.5 is considered underweight. · A BMI between 18.5 and 24.9 is considered healthy. · A BMI between 25 and 29.9 is considered overweight. A BMI of 30 or higher is considered obese. If your BMI is in the normal range, it means that you have a lower risk for weight-related health problems. If your BMI is in the overweight or obese range, you may be at increased risk for weight-related health problems, such as high blood pressure, heart disease, stroke, arthritis or joint pain, and diabetes. If your BMI is in the underweight range, you may be at increased risk for health problems such as fatigue, lower protection (immunity) against illness, muscle loss, bone loss, hair loss, and hormone problems. BMI is just one measure of your risk for weight-related health problems. You may be at higher risk for health problems if you are not active, you eat an unhealthy diet, or you drink too much alcohol or use tobacco products. Follow-up care is a key part of your treatment and safety. Be sure to make and go to all appointments, and call your doctor if you are having problems. It's also a good idea to know your test results and keep a list of the medicines you take. How can you care for yourself at home? · Practice healthy eating habits. This includes eating plenty of fruits, vegetables, whole grains, lean protein, and low-fat dairy. · If your doctor recommends it, get more exercise. Walking is a good choice. Bit by bit, increase the amount you walk every day. Try for at least 30 minutes on most days of the week. · Do not smoke. Smoking can increase your risk for health problems. If you need help quitting, talk to your doctor about stop-smoking programs and medicines. These can increase your chances of quitting for good. · Limit alcohol to 2 drinks a day for men and 1 drink a day for women. Too much alcohol can cause health problems. If you have a BMI higher than 25 · Your doctor may do other tests to check your risk for weight-related health problems. This may include measuring the distance around your waist. A waist measurement of more than 40 inches in men or 35 inches in women can increase the risk of weight-related health problems. · Talk with your doctor about steps you can take to stay healthy or improve your health. You may need to make lifestyle changes to lose weight and stay healthy, such as changing your diet and getting regular exercise. If you have a BMI lower than 18.5 · Your doctor may do other tests to check your risk for health problems. · Talk with your doctor about steps you can take to stay healthy or improve your health. You may need to make lifestyle changes to gain or maintain weight and stay healthy, such as getting more healthy foods in your diet and doing exercises to build muscle. Where can you learn more? Go to http://dany-miah.info/. Enter S176 in the search box to learn more about \"Body Mass Index: Care Instructions. \" Current as of: October 13, 2016 Content Version: 11.4 © 6351-9145 [employer].  Care instructions adapted under license by [employer] (which disclaims liability or warranty for this information). If you have questions about a medical condition or this instruction, always ask your healthcare professional. [place] [address] any warranty or liability for your use of this information. Well Visit, Men 48 to 72: Care Instructions Your Care Instructions Physical exams can help you stay healthy. Your doctor has checked your overall health and may have suggested ways to take good care of yourself. He or she also may have recommended tests. At home, you can help prevent illness with healthy eating, regular exercise, and other steps. Follow-up care is a key part of your treatment and safety. Be sure to make and go to all appointments, and call your doctor if you are having problems. It's also a good idea to know your test results and keep a list of the medicines you take. How can you care for yourself at home? · Reach and stay at a healthy weight. This will lower your risk for many problems, such as obesity, diabetes, heart disease, and high blood pressure. · Get at least 30 minutes of exercise on most days of the week. Walking is a good choice. You also may want to do other activities, such as running, swimming, cycling, or playing tennis or team sports. · Do not smoke. Smoking can make health problems worse. If you need help quitting, talk to your doctor about stop-smoking programs and medicines. These can increase your chances of quitting for good. · Protect your skin from too much sun. When you're outdoors from 10 a.m. to 4 p.m., stay in the shade or cover up with clothing and a hat with a wide brim. Wear sunglasses that block UV rays. Even when it's cloudy, put broad-spectrum sunscreen (SPF 30 or higher) on any exposed skin. · See a dentist one or two times a year for checkups and to have your teeth cleaned. · Wear a seat belt in the car. · Limit alcohol to 2 drinks a day. Too much alcohol can cause health problems. Follow your doctor's advice about when to have certain tests. These tests can spot problems early. · Cholesterol. Your doctor will tell you how often to have this done based on your overall health and other things that can increase your risk for heart attack and stroke. · Blood pressure. Have your blood pressure checked during a routine doctor visit. Your doctor will tell you how often to check your blood pressure based on your age, your blood pressure results, and other factors. · Prostate exam. Talk to your doctor about whether you should have a blood test (called a PSA test) for prostate cancer. Experts disagree on whether men should have this test. Some experts recommend that you discuss the benefits and risks of the test with your doctor. · Diabetes. Ask your doctor whether you should have tests for diabetes. · Vision. Some experts recommend that you have yearly exams for glaucoma and other age-related eye problems starting at age 48. · Hearing. Tell your doctor if you notice any change in your hearing. You can have tests to find out how well you hear. · Colon cancer. You should begin tests for colon cancer at age 48. You may have one of several tests. Your doctor will tell you how often to have tests based on your age and risk. Risks include whether you already had a precancerous polyp removed from your colon or whether your parent, brother, sister, or child has had colon cancer. · Heart attack and stroke risk. At least every 4 to 6 years, you should have your risk for heart attack and stroke assessed. Your doctor uses factors such as your age, blood pressure, cholesterol, and whether you smoke or have diabetes to show what your risk for a heart attack or stroke is over the next 10 years. · Abdominal aortic aneurysm. Ask your doctor whether you should have a test to check for an aneurysm. You may need a test if you ever smoked or if your parent, brother, sister, or child has had an aneurysm. When should you call for help? Watch closely for changes in your health, and be sure to contact your doctor if you have any problems or symptoms that concern you. Where can you learn more? Go to http://dany-miah.info/. Enter C278 in the search box to learn more about \"Well Visit, Men 48 to 72: Care Instructions. \" Current as of: May 12, 2017 Content Version: 11.4 © 0134-4198 [employer]. Care instructions adapted under license by [employer] (which disclaims liability or warranty for this information). If you have questions about a medical condition or this instruction, always ask your healthcare professional. [address] any warranty or liability for your use of this information. Introducing \[facility]\"" & HEALTH SERVICES! [facility] introduces Hotlist patient portal. Now you can access parts of your medical record, email your doctor's office, and request medication refills online. 1. In your internet browser, go to [email or web address] 2. Click on the First Time User? Click Here link in the Sign In box. You will see the New Member Sign Up page. 3. Enter your Hotlist Access Code exactly as it appears below. You will not need to use this code after youve completed the sign-up process. If you do not sign up before the expiration date, you must request a new code. · Hotlist Access Code: [email or web address] Expires: 5/31/2018  9:15 AM 
 
4. Enter the last four digits of your Social Security Number (xxxx) and Date of Birth (mm/dd/yyyy) as indicated and click Submit. You will be taken to the next sign-up page. 5. Create a Best Before [employer]iat ID. This will be your Hotlist login ID and cannot be changed, so think of one that is secure and easy to remember. 6. Create a Hotlist password. You can change your password at any time. 7. Enter your Password Reset Question and Answer.  This can be used at a later time if you forget your password. 8. Enter your e-mail address. You will receive e-mail notification when new information is available in [address] 9. Click Sign Up. You can now view and download portions of your medical record. 10. Click the Download Summary menu link to download a portable copy of your medical information. If you have questions, please visit the Frequently Asked Questions section of the [employer] website. Remember, [employer] is NOT to be used for urgent needs. For medical emergencies, dial 911. Now available from your iPhone and Android! Please provide this summary of care documentation to your next provider. If you have any questions after today's visit, please call [phone].

## 2018-04-18 NOTE — LETTER
4/20/2018 7:47 AM 
 
Mr. Alana Aguilera [address] [address] Dear Alana Aguilera: Please find your most recent results below. Resulted Orders CBC WITH AUTOMATED DIFF Result Value Ref Range WBC 6.6 3.4 - 10.8 x10E3/uL  
 RBC 4.72 4.14 - 5.80 x10E6/uL HGB 14.5 13.0 - 17.7 g/dL HCT 43.3 37.5 - 51.0 % MCV 92 79 - 97 fL  
 MCH 30.7 26.6 - 33.0 pg  
 MCHC 33.5 31.5 - 35.7 g/dL  
 RDW 13.3 12.3 - 15.4 % PLATELET 188 219 - 859 x10E3/uL NEUTROPHILS 59 Not Estab. % Lymphocytes 30 Not Estab. % MONOCYTES 7 Not Estab. % EOSINOPHILS 3 Not Estab. % BASOPHILS 1 Not Estab. %  
 ABS. NEUTROPHILS 3.9 1.4 - 7.0 x10E3/uL Abs Lymphocytes 2.0 0.7 - 3.1 x10E3/uL  
 ABS. MONOCYTES 0.5 0.1 - 0.9 x10E3/uL  
 ABS. EOSINOPHILS 0.2 0.0 - 0.4 x10E3/uL  
 ABS. BASOPHILS 0.0 0.0 - 0.2 x10E3/uL IMMATURE GRANULOCYTES 0 Not Estab. %  
 ABS. IMM. GRANS. 0.0 0.0 - 0.1 x10E3/uL Narrative Performed at:  [address]  [id number] : Adelfo Askew MD, Phone:  [phone] METABOLIC PANEL, COMPREHENSIVE Result Value Ref Range Glucose 121 (H) 65 - 99 mg/dL BUN 13 6 - 24 mg/dL Creatinine 0.78 0.76 - 1.27 mg/dL GFR est non- >59 mL/min/1.73 GFR est  >59 mL/min/1.73  
 BUN/Creatinine ratio 17 9 - 20 Sodium 140 134 - 144 mmol/L Potassium 4.4 3.5 - 5.2 mmol/L Chloride 100 96 - 106 mmol/L  
 CO2 22 18 - 29 mmol/L Calcium 8.9 8.7 - 10.2 mg/dL Protein, total 6.8 6.0 - 8.5 g/dL Albumin 4.3 3.5 - 5.5 g/dL GLOBULIN, TOTAL 2.5 1.5 - 4.5 g/dL A-G Ratio 1.7 1.2 - 2.2 Bilirubin, total 0.4 0.0 - 1.2 mg/dL Alk. phosphatase 75 39 - 117 IU/L  
 AST (SGOT) 43 (H) 0 - 40 IU/L  
 ALT (SGPT) 67 (H) 0 - 44 IU/L Narrative Performed at:  [address]  [id number] : Adelfo Askew MD, Phone:  [phone] TSH AND FREE T4  
 Result Value Ref Range TSH 2.000 0.450 - 4.500 uIU/mL T4, Free 1.07 0.82 - 1.77 ng/dL Narrative Performed at:  [address]  [id number] : Keith Baker MD, Phone:  [phone] LIPID PANEL Result Value Ref Range Cholesterol, total 163 100 - 199 mg/dL Triglyceride 225 (H) 0 - 149 mg/dL HDL Cholesterol 37 (L) >39 mg/dL VLDL, calculated 45 (H) 5 - 40 mg/dL LDL, calculated 81 0 - 99 mg/dL Narrative Performed at:  [address]  [id number] : Keith Baker MD, Phone:  [phone] PSA W/ REFLX FREE PSA Result Value Ref Range Prostate Specific Ag 1.6 0.0 - 4.0 ng/mL Comment:  
   Roche ECLIA methodology. According to the American Urological Association, Serum PSA should 
decrease and remain at undetectable levels after radical 
prostatectomy. The AUA defines biochemical recurrence as an initial 
PSA value 0.2 ng/mL or greater followed by a subsequent confirmatory PSA value 0.2 ng/mL or greater. Values obtained with different assay methods or kits cannot be used 
interchangeably. Results cannot be interpreted as absolute evidence 
of the presence or absence of malignant disease. Reflex Criteria Comment Comment:  
   The percent free PSA is performed on a reflex basis only when the 
total PSA is between 4.0 and 10.0 ng/mL. Narrative Performed at:  [address]  [id number] : Keith Baker MD, Phone:  [phone] HEMOGLOBIN A1C WITH EAG Result Value Ref Range Hemoglobin A1c 6.2 (H) 4.8 - 5.6 % Comment:  
            Pre-diabetes: 5.7 - 6.4 Diabetes: >6.4 Glycemic control for adults with diabetes: <7.0 Estimated average glucose 131 mg/dL Narrative Performed at:  [address]  [id number] : Xenia Izquierdo MD, Phone:  [phone] HEPATITIS C AB Result Value Ref Range Hep C Virus Ab <0.1 0.0 - 0.9 s/co ratio Comment:  
                                     Negative:     < 0.8 Indeterminate: 0.8 - 0.9 Positive:     > 0.9 The CDC recommends that a positive HCV antibody result 
 be followed up with a HCV Nucleic Acid Amplification 
 test (228966). Narrative Performed at:  [address]  [id number] : Xenia Izquierdo MD, Phone:  [phone] CVD REPORT Result Value Ref Range INTERPRETATION Note Comment:  
   Supplemental report is available. Narrative Performed at:  [address] [place] [address]  [id number] : Thanh Tan PhD, Phone:  [phone] RECOMMENDATIONS: 
 
1. CBC, kidney,  thyroid level is within normal range. Liver enzymes are slightly above normal range. Continue work on loosing weight. 2. Total cholesterol and bad cholesterol level is normal.  Triglyceride level is above normal range but stable. Continue watching your diet, eat healthy, less bradley and fatty food, more baked or grilled or broiled food. Exercise 150 minutes/week will be helpful as well. Will recheck level in one year. 3. Prostate enzyme level is normal. Hep C is negative. 4. Prediabetes: Average blood sugar( Hg A1c ) is 6.2, in the range of prediabetic level. Prediabetes is a warning sign that you are at risk for getting type 2 diabetes. It means that your blood sugar is higher than it should be. Most people who get type 2 diabetes have prediabetes first. The good news is that lifestyle changes may help you get your blood sugar back to normal and avoid or delay diabetes.  Will recheck this level in 6 months Please call me if you have any questions: [phone] Sincerely, 
 
 
 Chelsea Adam MD

## 2018-04-18 NOTE — PATIENT INSTRUCTIONS
Body Mass Index: Care Instructions  Your Care Instructions    Body mass index (BMI) can help you see if your weight is raising your risk for health problems. It uses a formula to compare how much you weigh with how tall you are. · A BMI lower than 18.5 is considered underweight. · A BMI between 18.5 and 24.9 is considered healthy. · A BMI between 25 and 29.9 is considered overweight. A BMI of 30 or higher is considered obese. If your BMI is in the normal range, it means that you have a lower risk for weight-related health problems. If your BMI is in the overweight or obese range, you may be at increased risk for weight-related health problems, such as high blood pressure, heart disease, stroke, arthritis or joint pain, and diabetes. If your BMI is in the underweight range, you may be at increased risk for health problems such as fatigue, lower protection (immunity) against illness, muscle loss, bone loss, hair loss, and hormone problems. BMI is just one measure of your risk for weight-related health problems. You may be at higher risk for health problems if you are not active, you eat an unhealthy diet, or you drink too much alcohol or use tobacco products. Follow-up care is a key part of your treatment and safety. Be sure to make and go to all appointments, and call your doctor if you are having problems. It's also a good idea to know your test results and keep a list of the medicines you take. How can you care for yourself at home? · Practice healthy eating habits. This includes eating plenty of fruits, vegetables, whole grains, lean protein, and low-fat dairy. · If your doctor recommends it, get more exercise. Walking is a good choice. Bit by bit, increase the amount you walk every day. Try for at least 30 minutes on most days of the week. · Do not smoke. Smoking can increase your risk for health problems. If you need help quitting, talk to your doctor about stop-smoking programs and medicines. These can increase your chances of quitting for good. · Limit alcohol to 2 drinks a day for men and 1 drink a day for women. Too much alcohol can cause health problems. If you have a BMI higher than 25  · Your doctor may do other tests to check your risk for weight-related health problems. This may include measuring the distance around your waist. A waist measurement of more than 40 inches in men or 35 inches in women can increase the risk of weight-related health problems. · Talk with your doctor about steps you can take to stay healthy or improve your health. You may need to make lifestyle changes to lose weight and stay healthy, such as changing your diet and getting regular exercise. If you have a BMI lower than 18.5  · Your doctor may do other tests to check your risk for health problems. · Talk with your doctor about steps you can take to stay healthy or improve your health. You may need to make lifestyle changes to gain or maintain weight and stay healthy, such as getting more healthy foods in your diet and doing exercises to build muscle. Where can you learn more? Go to http://dany-miah.info/. Enter S176 in the search box to learn more about \"Body Mass Index: Care Instructions. \"  Current as of: October 13, 2016  Content Version: 11.4  © 6689-2297 Healthwise, Incorporated. Care instructions adapted under license by [employer] (which disclaims liability or warranty for this information). If you have questions about a medical condition or this instruction, always ask your healthcare professional. [place] [address] any warranty or liability for your use of this information. Well Visit, Men 48 to 72: Care Instructions  Your Care Instructions    Physical exams can help you stay healthy. Your doctor has checked your overall health and may have suggested ways to take good care of yourself. He or she also may have recommended tests.  At home, you can help prevent illness with healthy eating, regular exercise, and other steps. Follow-up care is a key part of your treatment and safety. Be sure to make and go to all appointments, and call your doctor if you are having problems. It's also a good idea to know your test results and keep a list of the medicines you take. How can you care for yourself at home? · Reach and stay at a healthy weight. This will lower your risk for many problems, such as obesity, diabetes, heart disease, and high blood pressure. · Get at least 30 minutes of exercise on most days of the week. Walking is a good choice. You also may want to do other activities, such as running, swimming, cycling, or playing tennis or team sports. · Do not smoke. Smoking can make health problems worse. If you need help quitting, talk to your doctor about stop-smoking programs and medicines. These can increase your chances of quitting for good. · Protect your skin from too much sun. When you're outdoors from 10 a.m. to 4 p.m., stay in the shade or cover up with clothing and a hat with a wide brim. Wear sunglasses that block UV rays. Even when it's cloudy, put broad-spectrum sunscreen (SPF 30 or higher) on any exposed skin. · See a dentist one or two times a year for checkups and to have your teeth cleaned. · Wear a seat belt in the car. · Limit alcohol to 2 drinks a day. Too much alcohol can cause health problems. Follow your doctor's advice about when to have certain tests. These tests can spot problems early. · Cholesterol. Your doctor will tell you how often to have this done based on your overall health and other things that can increase your risk for heart attack and stroke. · Blood pressure. Have your blood pressure checked during a routine doctor visit. Your doctor will tell you how often to check your blood pressure based on your age, your blood pressure results, and other factors.   · Prostate exam. Talk to your doctor about whether you should have a blood test (called a PSA test) for prostate cancer. Experts disagree on whether men should have this test. Some experts recommend that you discuss the benefits and risks of the test with your doctor. · Diabetes. Ask your doctor whether you should have tests for diabetes. · Vision. Some experts recommend that you have yearly exams for glaucoma and other age-related eye problems starting at age 48. · Hearing. Tell your doctor if you notice any change in your hearing. You can have tests to find out how well you hear. · Colon cancer. You should begin tests for colon cancer at age 48. You may have one of several tests. Your doctor will tell you how often to have tests based on your age and risk. Risks include whether you already had a precancerous polyp removed from your colon or whether your parent, brother, sister, or child has had colon cancer. · Heart attack and stroke risk. At least every 4 to 6 years, you should have your risk for heart attack and stroke assessed. Your doctor uses factors such as your age, blood pressure, cholesterol, and whether you smoke or have diabetes to show what your risk for a heart attack or stroke is over the next 10 years. · Abdominal aortic aneurysm. Ask your doctor whether you should have a test to check for an aneurysm. You may need a test if you ever smoked or if your parent, brother, sister, or child has had an aneurysm. When should you call for help? Watch closely for changes in your health, and be sure to contact your doctor if you have any problems or symptoms that concern you. Where can you learn more? Go to http://dany-miah.info/. Enter P760 in the search box to learn more about \"Well Visit, Men 48 to 72: Care Instructions. \"  Current as of: May 12, 2017  Content Version: 11.4  © 6713-5843 Healthwise, Incorporated.  Care instructions adapted under license by [employer] (which disclaims liability or warranty for this information). If you have questions about a medical condition or this instruction, always ask your healthcare professional. [place] [address] any warranty or liability for your use of this information.

## 2018-04-19 LAB
ALBUMIN SERPL-MCNC: 4.3 G/DL (ref 3.5–5.5)
ALBUMIN/GLOB SERPL: 1.7 {RATIO} (ref 1.2–2.2)
ALP SERPL-CCNC: 75 IU/L (ref 39–117)
ALT SERPL-CCNC: 67 IU/L (ref 0–44)
AST SERPL-CCNC: 43 IU/L (ref 0–40)
BASOPHILS # BLD AUTO: 0 X10E3/UL (ref 0–0.2)
BASOPHILS NFR BLD AUTO: 1 %
BILIRUB SERPL-MCNC: 0.4 MG/DL (ref 0–1.2)
BUN SERPL-MCNC: 13 MG/DL (ref 6–24)
BUN/CREAT SERPL: 17 (ref 9–20)
CALCIUM SERPL-MCNC: 8.9 MG/DL (ref 8.7–10.2)
CHLORIDE SERPL-SCNC: 100 MMOL/L (ref 96–106)
CHOLEST SERPL-MCNC: 163 MG/DL (ref 100–199)
CO2 SERPL-SCNC: 22 MMOL/L (ref 18–29)
CREAT SERPL-MCNC: 0.78 MG/DL (ref 0.76–1.27)
EOSINOPHIL # BLD AUTO: 0.2 X10E3/UL (ref 0–0.4)
EOSINOPHIL NFR BLD AUTO: 3 %
ERYTHROCYTE [DISTWIDTH] IN BLOOD BY AUTOMATED COUNT: 13.3 % (ref 12.3–15.4)
EST. AVERAGE GLUCOSE BLD GHB EST-MCNC: 131 MG/DL
GFR SERPLBLD CREATININE-BSD FMLA CKD-EPI: 100 ML/MIN/1.73
GFR SERPLBLD CREATININE-BSD FMLA CKD-EPI: 116 ML/MIN/1.73
GLOBULIN SER CALC-MCNC: 2.5 G/DL (ref 1.5–4.5)
GLUCOSE SERPL-MCNC: 121 MG/DL (ref 65–99)
HBA1C MFR BLD: 6.2 % (ref 4.8–5.6)
HCT VFR BLD AUTO: 43.3 % (ref 37.5–51)
HCV AB S/CO SERPL IA: <0.1 S/CO RATIO (ref 0–0.9)
HDLC SERPL-MCNC: 37 MG/DL
HGB BLD-MCNC: 14.5 G/DL (ref 13–17.7)
IMM GRANULOCYTES # BLD: 0 X10E3/UL (ref 0–0.1)
IMM GRANULOCYTES NFR BLD: 0 %
INTERPRETATION, 910389: NORMAL
LDLC SERPL CALC-MCNC: 81 MG/DL (ref 0–99)
LYMPHOCYTES # BLD AUTO: 2 X10E3/UL (ref 0.7–3.1)
LYMPHOCYTES NFR BLD AUTO: 30 %
MCH RBC QN AUTO: 30.7 PG (ref 26.6–33)
MCHC RBC AUTO-ENTMCNC: 33.5 G/DL (ref 31.5–35.7)
MCV RBC AUTO: 92 FL (ref 79–97)
MONOCYTES # BLD AUTO: 0.5 X10E3/UL (ref 0.1–0.9)
MONOCYTES NFR BLD AUTO: 7 %
NEUTROPHILS # BLD AUTO: 3.9 X10E3/UL (ref 1.4–7)
NEUTROPHILS NFR BLD AUTO: 59 %
PLATELET # BLD AUTO: 223 X10E3/UL (ref 150–379)
POTASSIUM SERPL-SCNC: 4.4 MMOL/L (ref 3.5–5.2)
PROT SERPL-MCNC: 6.8 G/DL (ref 6–8.5)
PSA SERPL-MCNC: 1.6 NG/ML (ref 0–4)
RBC # BLD AUTO: 4.72 X10E6/UL (ref 4.14–5.8)
REFLEX CRITERIA: NORMAL
SODIUM SERPL-SCNC: 140 MMOL/L (ref 134–144)
T4 FREE SERPL-MCNC: 1.07 NG/DL (ref 0.82–1.77)
TRIGL SERPL-MCNC: 225 MG/DL (ref 0–149)
TSH SERPL DL<=0.005 MIU/L-ACNC: 2 UIU/ML (ref 0.45–4.5)
VLDLC SERPL CALC-MCNC: 45 MG/DL (ref 5–40)
WBC # BLD AUTO: 6.6 X10E3/UL (ref 3.4–10.8)

## 2018-04-19 NOTE — PROGRESS NOTES
Please mail letter:     1. CBC, kidney,  thyroid level is within normal range. Liver enzymes are slightly above normal range. Continue work on loosing weight. 2. Total cholesterol and bad cholesterol level is normal.  Triglyceride level is above normal range but stable. Continue watching your diet, eat healthy, less bradley and fatty food, more baked or grilled or broiled food. Exercise 150 minutes/week will be helpful as well. Will recheck level in one year. 3. Prostate enzyme level is normal. Hep C is negative. 4. Prediabetes: Average blood sugar( Hg A1c ) is 6.2, in the range of prediabetic level. Prediabetes is a warning sign that you are at risk for getting type 2 diabetes. It means that your blood sugar is higher than it should be. Most people who get type 2 diabetes have prediabetes first. The good news is that lifestyle changes may help you get your blood sugar back to normal and avoid or delay diabetes.   Will recheck this level in 6 months

## 2018-10-03 ENCOUNTER — OFFICE VISIT (OUTPATIENT)
Dept: PRIMARY CARE CLINIC | Age: 58
End: 2018-10-03

## 2018-10-03 VITALS
HEIGHT: 72 IN | DIASTOLIC BLOOD PRESSURE: 75 MMHG | BODY MASS INDEX: 41.77 KG/M2 | OXYGEN SATURATION: 96 % | HEART RATE: 62 BPM | TEMPERATURE: 97.9 F | RESPIRATION RATE: 17 BRPM | WEIGHT: 308.4 LBS | SYSTOLIC BLOOD PRESSURE: 121 MMHG

## 2018-10-03 DIAGNOSIS — G25.9 ABNORMAL LEG MOVEMENT: Primary | ICD-10-CM

## 2018-10-03 DIAGNOSIS — E66.01 OBESITY, MORBID (HCC): ICD-10-CM

## 2018-10-03 DIAGNOSIS — R73.03 PRE-DIABETES: ICD-10-CM

## 2018-10-03 DIAGNOSIS — Z23 ENCOUNTER FOR IMMUNIZATION: ICD-10-CM

## 2018-10-03 DIAGNOSIS — R25.3 MUSCLE TWITCHING: ICD-10-CM

## 2018-10-03 RX ORDER — ASPIRIN 81 MG/1
TABLET ORAL DAILY
COMMUNITY
End: 2021-03-11

## 2018-10-03 NOTE — PATIENT INSTRUCTIONS
Body Mass Index: Care Instructions Your Care Instructions Body mass index (BMI) can help you see if your weight is raising your risk for health problems. It uses a formula to compare how much you weigh with how tall you are. · A BMI lower than 18.5 is considered underweight. · A BMI between 18.5 and 24.9 is considered healthy. · A BMI between 25 and 29.9 is considered overweight. A BMI of 30 or higher is considered obese. If your BMI is in the normal range, it means that you have a lower risk for weight-related health problems. If your BMI is in the overweight or obese range, you may be at increased risk for weight-related health problems, such as high blood pressure, heart disease, stroke, arthritis or joint pain, and diabetes. If your BMI is in the underweight range, you may be at increased risk for health problems such as fatigue, lower protection (immunity) against illness, muscle loss, bone loss, hair loss, and hormone problems. BMI is just one measure of your risk for weight-related health problems. You may be at higher risk for health problems if you are not active, you eat an unhealthy diet, or you drink too much alcohol or use tobacco products. Follow-up care is a key part of your treatment and safety. Be sure to make and go to all appointments, and call your doctor if you are having problems. It's also a good idea to know your test results and keep a list of the medicines you take. How can you care for yourself at home? · Practice healthy eating habits. This includes eating plenty of fruits, vegetables, whole grains, lean protein, and low-fat dairy. · If your doctor recommends it, get more exercise. Walking is a good choice. Bit by bit, increase the amount you walk every day. Try for at least 30 minutes on most days of the week. · Do not smoke. Smoking can increase your risk for health problems.  If you need help quitting, talk to your doctor about stop-smoking programs and medicines. These can increase your chances of quitting for good. · Limit alcohol to 2 drinks a day for men and 1 drink a day for women. Too much alcohol can cause health problems. If you have a BMI higher than 25 · Your doctor may do other tests to check your risk for weight-related health problems. This may include measuring the distance around your waist. A waist measurement of more than 40 inches in men or 35 inches in women can increase the risk of weight-related health problems. · Talk with your doctor about steps you can take to stay healthy or improve your health. You may need to make lifestyle changes to lose weight and stay healthy, such as changing your diet and getting regular exercise. If you have a BMI lower than 18.5 · Your doctor may do other tests to check your risk for health problems. · Talk with your doctor about steps you can take to stay healthy or improve your health. You may need to make lifestyle changes to gain or maintain weight and stay healthy, such as getting more healthy foods in your diet and doing exercises to build muscle. Where can you learn more? Go to http://dany-miah.info/. Enter S176 in the search box to learn more about \"Body Mass Index: Care Instructions. \" Current as of: October 13, 2016 Content Version: 11.4 © 9718-2807 Healthwise, Incorporated. Care instructions adapted under license by [employer] (which disclaims liability or warranty for this information). If you have questions about a medical condition or this instruction, always ask your healthcare professional. [address] any warranty or liability for your use of this information.

## 2018-10-03 NOTE — PROGRESS NOTES
Subjective: Chief Complaint Patient presents with  
 Other  
  legs twich when for the last 3-4 days He  is a 63y.o. year old male very pleasant male with h/o sleep apnea on CPAP  who presents today with a c/o having leg twitching when he sleep at night. Reports that its not a new problem, its been going on for years. His wife notice that he moves his leg frequently and she also notice leg shakes right before he fell deep sleep. He states that he never felt any pain, crawling sensation or discomfort. Never wakes up with leg discomfort. He does mention that he has trouble staying asleep but never has any issues with falling asleep. He notice that whenever he has extra stress at work he has this issues. For the past few days he thinks he is not having any issues with his leg  since his wife did not mention anything about it. Has been using CPAP regularly. He has been trying to walk daily but due to time constrain he is unable to do that consistently. He denies any back pain, extremity week ness. Prediabetes:  
Lab Results Component Value Date/Time Hemoglobin A1c 6.2 (H) 04/18/2018 09:27 AM  
 
 
A comprehensive review of systems was negative except for that written in the HPI. Objective:  
 
Vitals:  
 10/03/18 0740 BP: 121/75 Pulse: 62 Resp: 17 Temp: 97.9 °F (36.6 °C) TempSrc: Oral  
SpO2: 96% Weight: 308 lb 6.4 oz (139.9 kg) Height: 6' (1.829 m) Physical Examination: General appearance - alert, well appearing, and in no distress, oriented to person, place, and time and obese. Mental status - alert, oriented to person, place, and time, normal mood, behavior, speech, dress, motor activity, and thought processes Chest - clear to auscultation, no wheezes, rales or rhonchi, symmetric air entry Heart - normal rate, regular rhythm, normal S1, S2, no murmurs, rubs, clicks or gallops Neurological - alert, oriented, normal speech, no focal findings or movement disorder noted, DTR's normal and symmetric, motor and sensory grossly normal bilaterally, normal muscle tone, no tremors, strength 5/5 Musculoskeletal - no joint tenderness, deformity or swelling Extremities - peripheral pulses normal, no clubbing or cyanosis, mild lower leg edema+, feet normal, good pulses, normal color, temperature and sensation No Known Allergies Social History Social History  Marital status:  Spouse name: N/A  
 Number of children: N/A  
 Years of education: N/A Social History Main Topics  Smoking status: Never Smoker  Smokeless tobacco: Never Used  Alcohol use 0.6 oz/week 1 Shots of liquor per week  Drug use: No  
 Sexual activity: Not Asked Other Topics Concern  None Social History Narrative Family History Problem Relation Age of Onset  Lung Disease Mother  Diabetes Mother  Diabetes Father Past Surgical History:  
Procedure Laterality Date  HX APPENDECTOMY  HX HERNIA REPAIR    
 HX TONSILLECTOMY History reviewed. No pertinent past medical history. Current Outpatient Prescriptions Medication Sig Dispense Refill  aspirin delayed-release 81 mg tablet Take  by mouth daily.  multivitamin (ONE A DAY) tablet Take 1 Tab by mouth daily. Assessment/ Plan:  
Diagnoses and all orders for this visit: 
 
1. Abnormal leg movement -    D/D is leg fatigue ness, restless syndrome, circulation issues. Patient reports that its a ongoing issue for few years but it does not bother him that much but wife is worried about that. Advised to increase water intake, rest, continue CPAP. OTC melatonin as needed for sleep difficulties. Will call with result and recommendation. Will consider neurology referral if needed. -   CBC WITH AUTOMATED DIFF to R/O low Hgb -     METABOLIC PANEL, BASIC 
-     DUPLEX LOW EXT ARTERY LEFT; Future -     DUPLEX LOW EXT ARTERY RIGHT; Future 2. Muscle twitching -    Same as above -   CBC WITH AUTOMATED DIFF 
-     METABOLIC PANEL, BASIC 
-     DUPLEX LOW EXT ARTERY LEFT; Future -     DUPLEX LOW EXT ARTERY RIGHT; Future 3. Pre-diabetes 
-     HEMOGLOBIN A1C WITH EAG Encouraged to work on diet and regular exercise. 4. Obesity, morbid ([address] Discussed the patient's BMI with him. The BMI follow up plan is as follows:  
 
dietary management education, guidance, and counseling 
encourage exercise 
monitor weight 5. Encounter for immunization 
-     INFLUENZA VIRUS VACCINE QUADRIVALENT, PRESERVATIVE FREE SYRINGE (08401) Medication risks/benefits/costs/interactions/alternatives discussed with patient. Advised patient to call back or return to office if symptoms worsen/change/persist. If patient cannot reach us or should anything more severe/urgent arise he/she should proceed directly to the nearest emergency department. Discussed expected course/resolution/complications of diagnosis in detail with patient. Patient given a written after visit summary which includes her diagnoses, current medications and vitals. Patient expressed understanding with the diagnosis and plan. Follow-up Disposition: 
Return if symptoms worsen or fail to improve. An After Visit Summary was printed and given to the patient.

## 2018-10-03 NOTE — MR AVS SNAPSHOT
[address] 
[phone] Patient: Kirt Mitchell MRN: [id number]:[id number] Visit Information Date & Time Provider Department Dept. Phone Encounter #  
 10/3/2018  8:00 AM [facility] [phone] [id number] Follow-up Instructions Return if symptoms worsen or fail to improve. Upcoming Health Maintenance Date Due Shingrix Vaccine Age 50> (1 of 2) 1/20/2011 Influenza Age 5 to Adult 8/1/2018 COLONOSCOPY 11/15/2027 DTaP/Tdap/Td series (2 - Td) 3/2/2028 Allergies as of 10/3/2018  Review Complete On: 10/3/2018 By: Paola Maria MD  
 No Known Allergies Current Immunizations  Reviewed on 3/2/2018 Name Date Influenza Vaccine (Quad) PF 10/3/2018 Not reviewed this visit You Were Diagnosed With   
  
 Codes Comments Restless leg    -  Primary ICD-10-CM: G25.81 ICD-9-CM: 333.94 Muscle twitching     ICD-10-CM: R25.3 ICD-9-CM: 781.0 Encounter for immunization     ICD-10-CM: N44 ICD-9-CM: V03.89 Pre-diabetes     ICD-10-CM: R73.03 
ICD-9-CM: 790.29 Vitals BP Pulse Temp Resp Height(growth percentile) Weight(growth percentile) 121/75 (BP 1 Location: Left arm, BP Patient Position: Sitting) 62 97.9 °F (36.6 °C) (Oral) 17 6' (1.829 m) 308 lb 6.4 oz (139.9 kg) SpO2 BMI Smoking Status 96% 41.83 kg/m2 Never Smoker Vitals History BMI and BSA Data Body Mass Index Body Surface Area  
 41.83 kg/m 2 2.67 m 2 Preferred Pharmacy Pharmacy Name Phone [address] [place] [address] [phone] Your Updated Medication List  
  
   
This list is accurate as of 10/3/18  8:12 AM.  Always use your most recent med list.  
  
  
  
  
 aspirin delayed-release 81 mg tablet Take  by mouth daily. multivitamin tablet Commonly known as:  ONE A DAY Take 1 Tab by mouth daily. We Performed the Following CBC WITH AUTOMATED DIFF [31605 CPT(R)] HEMOGLOBIN A1C WITH EAG [52162 CPT(R)] INFLUENZA VIRUS VAC QUAD,SPLIT,PRESV FREE SYRINGE IM [id number] CPT(R)] METABOLIC PANEL, BASIC [30389 CPT(R)] Follow-up Instructions Return if symptoms worsen or fail to improve. To-Do List   
 10/03/2018 Imaging:  DUPLEX LOW EXT ARTERY LEFT   
  
 10/03/2018 Imaging:  DUPLEX LOW EXT ARTERY RIGHT Introducing [facility]! [facility] introduces [email or web address] patient portal. Now you can access parts of your medical record, email your doctor's office, and request medication refills online. 1. In your internet browser, go to [email or web address] 2. Click on the First Time User? Click Here link in the Sign In box. You will see the New Member Sign Up page. 3. Enter your [email or web address] Access Code exactly as it appears below. You will not need to use this code after youve completed the sign-up process. If you do not sign up before the expiration date, you must request a new code. · [email or web address] Access Code: [email or web address] Expires: 1/1/2019  7:43 AM 
 
4. Enter the last four digits of your Social Security Number (xxxx) and Date of Birth (mm/dd/yyyy) as indicated and click Submit. You will be taken to the next sign-up page. 5. Create a Bright.md ID. This will be your Bright.md login ID and cannot be changed, so think of one that is secure and easy to remember. 6. Create a Bright.md password. You can change your password at any time. 7. Enter your Password Reset Question and Answer. This can be used at a later time if you forget your password. 8. Enter your e-mail address. You will receive e-mail notification when new information is available in [address] 9. Click Sign Up. You can now view and download portions of your medical record. 10. Click the Download Summary menu link to download a portable copy of your medical information. If you have questions, please visit the Frequently Asked Questions section of the [employer] website. Remember, [employer] Universe is NOT to be used for urgent needs. For medical emergencies, dial 911. Now available from your iPhone and Android! Please provide this summary of care documentation to your next provider. If you have any questions after today's visit, please call [phone].

## 2018-10-04 LAB
BASOPHILS # BLD AUTO: 0 X10E3/UL (ref 0–0.2)
BASOPHILS NFR BLD AUTO: 1 %
BUN SERPL-MCNC: 14 MG/DL (ref 6–24)
BUN/CREAT SERPL: 18 (ref 9–20)
CALCIUM SERPL-MCNC: 9.7 MG/DL (ref 8.7–10.2)
CHLORIDE SERPL-SCNC: 103 MMOL/L (ref 96–106)
CO2 SERPL-SCNC: 21 MMOL/L (ref 20–29)
CREAT SERPL-MCNC: 0.79 MG/DL (ref 0.76–1.27)
EOSINOPHIL # BLD AUTO: 0.1 X10E3/UL (ref 0–0.4)
EOSINOPHIL NFR BLD AUTO: 2 %
ERYTHROCYTE [DISTWIDTH] IN BLOOD BY AUTOMATED COUNT: 13.6 % (ref 12.3–15.4)
EST. AVERAGE GLUCOSE BLD GHB EST-MCNC: 143 MG/DL
GLUCOSE SERPL-MCNC: 151 MG/DL (ref 65–99)
HBA1C MFR BLD: 6.6 % (ref 4.8–5.6)
HCT VFR BLD AUTO: 45.5 % (ref 37.5–51)
HGB BLD-MCNC: 15.6 G/DL (ref 13–17.7)
IMM GRANULOCYTES # BLD: 0 X10E3/UL (ref 0–0.1)
IMM GRANULOCYTES NFR BLD: 0 %
LYMPHOCYTES # BLD AUTO: 2 X10E3/UL (ref 0.7–3.1)
LYMPHOCYTES NFR BLD AUTO: 31 %
MCH RBC QN AUTO: 31.1 PG (ref 26.6–33)
MCHC RBC AUTO-ENTMCNC: 34.3 G/DL (ref 31.5–35.7)
MCV RBC AUTO: 91 FL (ref 79–97)
MONOCYTES # BLD AUTO: 0.4 X10E3/UL (ref 0.1–0.9)
MONOCYTES NFR BLD AUTO: 6 %
NEUTROPHILS # BLD AUTO: 4 X10E3/UL (ref 1.4–7)
NEUTROPHILS NFR BLD AUTO: 60 %
PLATELET # BLD AUTO: 228 X10E3/UL (ref 150–379)
POTASSIUM SERPL-SCNC: 4.2 MMOL/L (ref 3.5–5.2)
RBC # BLD AUTO: 5.01 X10E6/UL (ref 4.14–5.8)
SODIUM SERPL-SCNC: 140 MMOL/L (ref 134–144)
WBC # BLD AUTO: 6.6 X10E3/UL (ref 3.4–10.8)

## 2018-10-05 ENCOUNTER — TELEPHONE (OUTPATIENT)
Dept: PRIMARY CARE CLINIC | Age: 58
End: 2018-10-05

## 2018-10-05 DIAGNOSIS — G25.9 ABNORMAL LEG MOVEMENT: Primary | ICD-10-CM

## 2018-10-05 DIAGNOSIS — R25.3 MUSCLE TWITCHING: ICD-10-CM

## 2018-10-05 NOTE — PROGRESS NOTES
Please call patient: 1. Average blood sugar ( A1C ) level is in diabetic range. Its is a warning sign that you are at risk for getting full blown type 2 diabetes. It means that your blood sugar is higher than it should be. Most people who get type 2 diabetes have prediabetes first. The good news is that lifestyle changes may help you get your blood sugar back to normal and avoid or delay diabetes. Will recheck this level in 6 months.  
 
2. Kidney function is normal.

## 2018-10-05 NOTE — TELEPHONE ENCOUNTER
Spoke with Aimee, new order for CAROLYN placed. Wants it faxed to her @ [phone]. Order faxed as requested.

## 2018-10-05 NOTE — TELEPHONE ENCOUNTER
Spoke with pt and he verbalized his understanding. He said he will start working on things now to try and get that level back down. He also stated he will call back in a couple of months to schedule his follow up appointment.

## 2018-10-05 NOTE — TELEPHONE ENCOUNTER
----- Message from Amy Cardoso MD sent at 10/5/2018 12:14 PM EDT -----  Please call patient:    1. Average blood sugar ( A1C ) level is in diabetic range. Its is a warning sign that you are at risk for getting full blown type 2 diabetes. It means that your blood sugar is higher than it should be. Most people who get type 2 diabetes have prediabetes first. The good news is that lifestyle changes may help you get your blood sugar back to normal and avoid or delay diabetes. Will recheck this level in 6 months.     2. Kidney function is normal.

## 2018-10-11 ENCOUNTER — TELEPHONE (OUTPATIENT)
Dept: PRIMARY CARE CLINIC | Age: 58
End: 2018-10-11

## 2018-10-11 ENCOUNTER — HOSPITAL ENCOUNTER (OUTPATIENT)
Dept: ULTRASOUND IMAGING | Age: 58
Discharge: HOME | End: 2018-10-11
Attending: FAMILY MEDICINE
Payer: COMMERCIAL

## 2018-10-11 DIAGNOSIS — G25.9 ABNORMAL LEG MOVEMENT: ICD-10-CM

## 2018-10-11 DIAGNOSIS — R25.3 MUSCLE TWITCHING: ICD-10-CM

## 2018-10-11 PROCEDURE — 93922 UPR/L XTREMITY ART 2 LEVELS: CPT

## 2018-10-11 NOTE — TELEPHONE ENCOUNTER
Informed pt that results were normal. Pt verbalized his understanding. States that he started taking melatonin as advised and it has been working well for him. Will follow up if needed.

## 2018-10-11 NOTE — TELEPHONE ENCOUNTER
----- Message from Bryan Duffy MD sent at 10/11/2018  3:03 PM EDT -----  Please call patient to inform that CAROLYN test came back negative for peripheral vascular d/s.

## 2019-04-18 ENCOUNTER — OFFICE VISIT (OUTPATIENT)
Dept: PRIMARY CARE CLINIC | Age: 59
End: 2019-04-18

## 2019-04-18 VITALS
WEIGHT: 315 LBS | HEART RATE: 63 BPM | OXYGEN SATURATION: 96 % | BODY MASS INDEX: 42.66 KG/M2 | TEMPERATURE: 97.9 F | DIASTOLIC BLOOD PRESSURE: 94 MMHG | SYSTOLIC BLOOD PRESSURE: 156 MMHG | RESPIRATION RATE: 16 BRPM | HEIGHT: 72 IN

## 2019-04-18 DIAGNOSIS — G47.30 SLEEP APNEA IN ADULT: ICD-10-CM

## 2019-04-18 DIAGNOSIS — R73.09 ELEVATED HEMOGLOBIN A1C: ICD-10-CM

## 2019-04-18 DIAGNOSIS — E11.9 TYPE 2 DIABETES MELLITUS WITHOUT COMPLICATION, WITHOUT LONG-TERM CURRENT USE OF INSULIN (HCC): ICD-10-CM

## 2019-04-18 DIAGNOSIS — I10 ESSENTIAL HYPERTENSION: ICD-10-CM

## 2019-04-18 DIAGNOSIS — E66.01 OBESITY, MORBID (HCC): ICD-10-CM

## 2019-04-18 DIAGNOSIS — Z00.00 WELL ADULT ON ROUTINE HEALTH CHECK: Primary | ICD-10-CM

## 2019-04-18 LAB — HBA1C MFR BLD HPLC: 7 %

## 2019-04-18 RX ORDER — INSULIN PUMP SYRINGE, 3 ML
EACH MISCELLANEOUS
Qty: 1 KIT | Refills: 0 | Status: SHIPPED | OUTPATIENT
Start: 2019-04-18

## 2019-04-18 RX ORDER — LANCETS
EACH MISCELLANEOUS
Qty: 50 EACH | Refills: 11 | Status: SHIPPED | OUTPATIENT
Start: 2019-04-18

## 2019-04-18 RX ORDER — METFORMIN HYDROCHLORIDE 500 MG/1
500 TABLET, EXTENDED RELEASE ORAL
Qty: 30 TAB | Refills: 3 | Status: SHIPPED | OUTPATIENT
Start: 2019-04-18 | End: 2019-07-19 | Stop reason: SDUPTHER

## 2019-04-18 RX ORDER — LISINOPRIL AND HYDROCHLOROTHIAZIDE 10; 12.5 MG/1; MG/1
1 TABLET ORAL DAILY
Qty: 30 TAB | Refills: 3 | Status: SHIPPED | OUTPATIENT
Start: 2019-04-18 | End: 2019-07-19 | Stop reason: DRUGHIGH

## 2019-04-18 NOTE — PROGRESS NOTES
Chief Complaint Patient presents with  Complete Physical  
  fasting 1. Have you been to the ER, urgent care clinic since your last visit? Hospitalized since your last visit? No 
 
2. Have you seen or consulted any other health care providers outside of the [address] [place] [address] since your last visit? Include any pap smears or colon screening.  No

## 2019-04-18 NOTE — PATIENT INSTRUCTIONS
Body Mass Index: Care Instructions Your Care Instructions Body mass index (BMI) can help you see if your weight is raising your risk for health problems. It uses a formula to compare how much you weigh with how tall you are. · A BMI lower than 18.5 is considered underweight. · A BMI between 18.5 and 24.9 is considered healthy. · A BMI between 25 and 29.9 is considered overweight. A BMI of 30 or higher is considered obese. If your BMI is in the normal range, it means that you have a lower risk for weight-related health problems. If your BMI is in the overweight or obese range, you may be at increased risk for weight-related health problems, such as high blood pressure, heart disease, stroke, arthritis or joint pain, and diabetes. If your BMI is in the underweight range, you may be at increased risk for health problems such as fatigue, lower protection (immunity) against illness, muscle loss, bone loss, hair loss, and hormone problems. BMI is just one measure of your risk for weight-related health problems. You may be at higher risk for health problems if you are not active, you eat an unhealthy diet, or you drink too much alcohol or use tobacco products. Follow-up care is a key part of your treatment and safety. Be sure to make and go to all appointments, and call your doctor if you are having problems. It's also a good idea to know your test results and keep a list of the medicines you take. How can you care for yourself at home? · Practice healthy eating habits. This includes eating plenty of fruits, vegetables, whole grains, lean protein, and low-fat dairy. · If your doctor recommends it, get more exercise. Walking is a good choice. Bit by bit, increase the amount you walk every day. Try for at least 30 minutes on most days of the week. · Do not smoke. Smoking can increase your risk for health problems.  If you need help quitting, talk to your doctor about stop-smoking programs and medicines. These can increase your chances of quitting for good. · Limit alcohol to 2 drinks a day for men and 1 drink a day for women. Too much alcohol can cause health problems. If you have a BMI higher than 25 · Your doctor may do other tests to check your risk for weight-related health problems. This may include measuring the distance around your waist. A waist measurement of more than 40 inches in men or 35 inches in women can increase the risk of weight-related health problems. · Talk with your doctor about steps you can take to stay healthy or improve your health. You may need to make lifestyle changes to lose weight and stay healthy, such as changing your diet and getting regular exercise. If you have a BMI lower than 18.5 · Your doctor may do other tests to check your risk for health problems. · Talk with your doctor about steps you can take to stay healthy or improve your health. You may need to make lifestyle changes to gain or maintain weight and stay healthy, such as getting more healthy foods in your diet and doing exercises to build muscle. Where can you learn more? Go to http://dany-miah.info/. Enter S176 in the search box to learn more about \"Body Mass Index: Care Instructions. \" Current as of: June 25, 2018 Content Version: 11.9 © 7997-4025 [employer], [employer]. Care instructions adapted under license by [employer] (which disclaims liability or warranty for this information). If you have questions about a medical condition or this instruction, always ask your healthcare professional. [place] [address] any warranty or liability for your use of this information. Acute High Blood Pressure: Care Instructions Your Care Instructions Acute high blood pressure is very high blood pressure. It's a serious problem. Very high blood pressure can damage your brain, heart, eyes, and kidneys. You may have been given medicines to lower your blood pressure. You may have gotten them as pills or through a needle in one of your veins. This is called an IV. And maybe you were given other medicines too. These can be needed when high blood pressure causes other problems. To keep your blood pressure at a lower level, you may need to make healthy lifestyle changes. And you will probably need to take medicines. Be sure to follow up with your doctor about your blood pressure and what you can do about it. Follow-up care is a key part of your treatment and safety. Be sure to make and go to all appointments, and call your doctor if you are having problems. It's also a good idea to know your test results and keep a list of the medicines you take. How can you care for yourself at home? · See your doctor as often as he or she recommends. This is to make sure your blood pressure is under control. You will probably go at least 2 times a year. But it may be more often at first. 
· Take your blood pressure medicine exactly as prescribed. You may take one or more types. They include diuretics, beta-blockers, ACE inhibitors, calcium channel blockers, and angiotensin II receptor blockers. Call your doctor if you think you are having a problem with your medicine. · If you take blood pressure medicine, talk to your doctor before you take decongestants or anti-inflammatory medicine, such as ibuprofen. These can raise blood pressure. · Learn how to check your blood pressure at home. Check it often. · Ask your doctor if it's okay to drink alcohol. · Talk to your doctor about lifestyle changes that can help blood pressure. These include being active and not smoking. When should you call for help? Call 911 anytime you think you may need emergency care. This may mean having symptoms that suggest that your blood pressure is causing a serious heart or blood vessel problem. Your blood pressure may be over 180/120.  For example, call 911 if: 
  · You have symptoms of a heart attack. These may include: 
? Chest pain or pressure, or a strange feeling in the chest. 
? Sweating. ? Shortness of breath. ? Nausea or vomiting. ? Pain, pressure, or a strange feeling in the back, neck, jaw, or upper belly or in one or both shoulders or arms. ? Lightheadedness or sudden weakness. ? A fast or irregular heartbeat.  
  · You have symptoms of a stroke. These may include: 
? Sudden numbness, tingling, weakness, or loss of movement in your face, arm, or leg, especially on only one side of your body. ? Sudden vision changes. ? Sudden trouble speaking. ? Sudden confusion or trouble understanding simple statements. ? Sudden problems with walking or balance. ? A sudden, severe headache that is different from past headaches.  
  · You have severe back or belly pain.  
 Do not wait until your blood pressure comes down on its own. Get help right away. 
 Call your doctor now or seek immediate care if: 
  · Your blood pressure is much higher than normal (such as 180/120 or higher), but you don't have symptoms.  
  · You think high blood pressure is causing symptoms, such as: 
? Severe headache. 
? Blurry vision.  
 Watch closely for changes in your health, and be sure to contact your doctor if: 
  · Your blood pressure measures higher than your doctor recommends at least 2 times. That means the top number is higher or the bottom number is higher, or both.  
  · You think you may be having side effects from your blood pressure medicine. Where can you learn more? Go to http://dany-miah.info/. Enter C476 in the search box to learn more about \"Acute High Blood Pressure: Care Instructions. \" Current as of: July 22, 2018 Content Version: 11.9 © 3253-8429 [employer].  Care instructions adapted under license by [employer] (which disclaims liability or warranty for this information). If you have questions about a medical condition or this instruction, always ask your healthcare professional. [address] any warranty or liability for your use of this information. Learning About Diabetes Food Guidelines Your Care Instructions Meal planning is important to manage diabetes. It helps keep your blood sugar at a target level (which you set with your doctor). You don't have to eat special foods. You can eat what your family eats, including sweets once in a while. But you do have to pay attention to how often you eat and how much you eat of certain foods. You may want to work with a dietitian or a certified diabetes educator (CDE) to help you plan meals and snacks. A dietitian or CDE can also help you lose weight if that is one of your goals. What should you know about eating carbs? Managing the amount of carbohydrate (carbs) you eat is an important part of healthy meals when you have diabetes. Carbohydrate is found in many foods. · Learn which foods have carbs. And learn the amounts of carbs in different foods. ? Bread, cereal, pasta, and rice have about 15 grams of carbs in a serving. A serving is 1 slice of bread (1 ounce), ½ cup of cooked cereal, or 1/3 cup of cooked pasta or rice. ? Fruits have 15 grams of carbs in a serving. A serving is 1 small fresh fruit, such as an apple or orange; ½ of a banana; ½ cup of cooked or canned fruit; ½ cup of fruit juice; 1 cup of melon or raspberries; or 2 tablespoons of dried fruit. ? Milk and no-sugar-added yogurt have 15 grams of carbs in a serving. A serving is 1 cup of milk or 2/3 cup of no-sugar-added yogurt. ? Starchy vegetables have 15 grams of carbs in a serving. A serving is ½ cup of mashed potatoes or sweet potato; 1 cup winter squash; ½ of a small baked potato; ½ cup of cooked beans; or ½ cup cooked corn or green peas. · Learn how much carbs to eat each day and at each meal. A dietitian or CDE can teach you how to keep track of the amount of carbs you eat. This is called carbohydrate counting. · If you are not sure how to count carbohydrate grams, use the Plate Method to plan meals. It is a good, quick way to make sure that you have a balanced meal. It also helps you spread carbs throughout the day. ? Divide your plate by types of foods. Put non-starchy vegetables on half the plate, meat or other protein food on one-quarter of the plate, and a grain or starchy vegetable in the final quarter of the plate. To this you can add a small piece of fruit and 1 cup of milk or yogurt, depending on how many carbs you are supposed to eat at a meal. 
· Try to eat about the same amount of carbs at each meal. Do not \"save up\" your daily allowance of carbs to eat at one meal. 
· Proteins have very little or no carbs per serving. Examples of proteins are beef, chicken, turkey, fish, eggs, tofu, cheese, cottage cheese, and peanut butter. A serving size of meat is 3 ounces, which is about the size of a deck of cards. Examples of meat substitute serving sizes (equal to 1 ounce of meat) are 1/4 cup of cottage cheese, 1 egg, 1 tablespoon of peanut butter, and ½ cup of tofu. How can you eat out and still eat healthy? · Learn to estimate the serving sizes of foods that have carbohydrate. If you measure food at home, it will be easier to estimate the amount in a serving of restaurant food. · If the meal you order has too much carbohydrate (such as potatoes, corn, or baked beans), ask to have a low-carbohydrate food instead. Ask for a salad or green vegetables. · If you use insulin, check your blood sugar before and after eating out to help you plan how much to eat in the future. · If you eat more carbohydrate at a meal than you had planned, take a walk or do other exercise. This will help lower your blood sugar. What else should you know? · Limit saturated fat, such as the fat from meat and dairy products. This is a healthy choice because people who have diabetes are at higher risk of heart disease. So choose lean cuts of meat and nonfat or low-fat dairy products. Use olive or canola oil instead of butter or shortening when cooking. · Don't skip meals. Your blood sugar may drop too low if you skip meals and take insulin or certain medicines for diabetes. · Check with your doctor before you drink alcohol. Alcohol can cause your blood sugar to drop too low. Alcohol can also cause a bad reaction if you take certain diabetes medicines. Follow-up care is a key part of your treatment and safety. Be sure to make and go to all appointments, and call your doctor if you are having problems. It's also a good idea to know your test results and keep a list of the medicines you take. Where can you learn more? Go to http://dany-miah.info/. Enter O479 in the search box to learn more about \"Learning About Diabetes Food Guidelines. \" Current as of: July 25, 2018 Content Version: 11.9 © 5791-8165 [employer]. Care instructions adapted under license by [email or web address] (which disclaims liability or warranty for this information). If you have questions about a medical condition or this instruction, always ask your healthcare professional. [address] any warranty or liability for your use of this information. Body Mass Index: Care Instructions Your Care Instructions Body mass index (BMI) can help you see if your weight is raising your risk for health problems. It uses a formula to compare how much you weigh with how tall you are. · A BMI lower than 18.5 is considered underweight. · A BMI between 18.5 and 24.9 is considered healthy. · A BMI between 25 and 29.9 is considered overweight. A BMI of 30 or higher is considered obese. If your BMI is in the normal range, it means that you have a lower risk for weight-related health problems. If your BMI is in the overweight or obese range, you may be at increased risk for weight-related health problems, such as high blood pressure, heart disease, stroke, arthritis or joint pain, and diabetes. If your BMI is in the underweight range, you may be at increased risk for health problems such as fatigue, lower protection (immunity) against illness, muscle loss, bone loss, hair loss, and hormone problems. BMI is just one measure of your risk for weight-related health problems. You may be at higher risk for health problems if you are not active, you eat an unhealthy diet, or you drink too much alcohol or use tobacco products. Follow-up care is a key part of your treatment and safety. Be sure to make and go to all appointments, and call your doctor if you are having problems. It's also a good idea to know your test results and keep a list of the medicines you take. How can you care for yourself at home? · Practice healthy eating habits. This includes eating plenty of fruits, vegetables, whole grains, lean protein, and low-fat dairy. · If your doctor recommends it, get more exercise. Walking is a good choice. Bit by bit, increase the amount you walk every day. Try for at least 30 minutes on most days of the week. · Do not smoke. Smoking can increase your risk for health problems. If you need help quitting, talk to your doctor about stop-smoking programs and medicines. These can increase your chances of quitting for good. · Limit alcohol to 2 drinks a day for men and 1 drink a day for women. Too much alcohol can cause health problems. If you have a BMI higher than 25 · Your doctor may do other tests to check your risk for weight-related health problems.  This may include measuring the distance around your waist. A waist measurement of more than 40 inches in men or 35 inches in women can increase the risk of weight-related health problems. · Talk with your doctor about steps you can take to stay healthy or improve your health. You may need to make lifestyle changes to lose weight and stay healthy, such as changing your diet and getting regular exercise. If you have a BMI lower than 18.5 · Your doctor may do other tests to check your risk for health problems. · Talk with your doctor about steps you can take to stay healthy or improve your health. You may need to make lifestyle changes to gain or maintain weight and stay healthy, such as getting more healthy foods in your diet and doing exercises to build muscle. Where can you learn more? Go to http://dany-miah.info/. Enter S176 in the search box to learn more about \"Body Mass Index: Care Instructions. \" Current as of: October 13, 2016 Content Version: 11.4 © 6878-9131 [employer], Incorporated. Care instructions adapted under license by [employer] (which disclaims liability or warranty for this information). If you have questions about a medical condition or this instruction, always ask your healthcare professional. [address] any warranty or liability for your use of this information.

## 2019-04-18 NOTE — PROGRESS NOTES
. 
Subjective:  
Luis Herring is a 63 y.o. male presenting for his annual checkup. Last A1c was 6.6. Patient reports that he drinks >10 beers in the weekend. He is compliant with CPAP. He does not do any exercise and does not follow healthy diet ROS:  Feeling well. No dyspnea or chest pain on exertion. No abdominal pain, change in bowel habits, black or bloody stools. No urinary tract or prostatic symptoms. No neurological complaints. Specific concerns today: refer to HPI Patient Active Problem List  
Diagnosis Code  Sleep apnea in adult G47.30  Obesity, morbid ([facility] [address] E66.01  
 Type 2 diabetes mellitus without complication, without long-term current use of insulin ([facility] [address] E11.9  
 Essential hypertension I10 Current Outpatient Medications Medication Sig Dispense Refill  OTHER Indications: Black Cherry Extract  metFORMIN ER (GLUCOPHAGE XR) 500 mg tablet Take 1 Tab by mouth daily (with dinner). 30 Tab 3  
 lisinopril-hydroCHLOROthiazide (PRINZIDE, ZESTORETIC) 10-12.5 mg per tablet Take 1 Tab by mouth daily. 30 Tab 3  
 glucose blood VI test strips (FREESTYLE LITE STRIPS) strip Check fasting blood sugar in AM X 1. 50 Strip 11  Blood-Glucose Meter (FREESTYLE LITE METER) monitoring kit Check fasting blood sugar in AM X 1. 1 Kit 0  
 lancets misc Check fasting blood sugar in AM X 1. 50 Each 11  
 MELATONIN PO Take  by mouth nightly as needed.  aspirin delayed-release 81 mg tablet Take  by mouth daily.  multivitamin (ONE A DAY) tablet Take 1 Tab by mouth daily. No Known Allergies History reviewed. No pertinent past medical history. Past Surgical History:  
Procedure Laterality Date  HX APPENDECTOMY  HX HERNIA REPAIR    
 HX TONSILLECTOMY Family History Problem Relation Age of Onset  Lung Disease Mother  Diabetes Mother  Diabetes Father Social History Tobacco Use  Smoking status: Never Smoker  Smokeless tobacco: Never Used Substance Use Topics  Alcohol use: Yes Alcohol/week: 3.6 - 6.0 oz Types: 6 - 10 Cans of beer per week Comment: on saturday night Lab Results Component Value Date/Time WBC 6.6 10/03/2018 08:14 AM  
 HGB 15.6 10/03/2018 08:14 AM  
 HCT 45.5 10/03/2018 08:14 AM  
 PLATELET 912 22/33/4229 08:14 AM  
 MCV 91 10/03/2018 08:14 AM  
 
Lab Results Component Value Date/Time ALT (SGPT) 67 (H) 04/18/2018 09:27 AM  
 AST (SGOT) 43 (H) 04/18/2018 09:27 AM  
 Alk. phosphatase 75 04/18/2018 09:27 AM  
 Bilirubin, direct 0.15 03/02/2018 08:46 AM  
 Bilirubin, total 0.4 04/18/2018 09:27 AM  
 Albumin 4.3 04/18/2018 09:27 AM  
 Protein, total 6.8 04/18/2018 09:27 AM  
 PLATELET 226 24/72/1114 08:14 AM  
 
Lab Results Component Value Date/Time GFR est non- 10/03/2018 08:14 AM  
 GFR est  10/03/2018 08:14 AM  
 Creatinine 0.79 10/03/2018 08:14 AM  
 BUN 14 10/03/2018 08:14 AM  
 Sodium 140 10/03/2018 08:14 AM  
 Potassium 4.2 10/03/2018 08:14 AM  
 Chloride 103 10/03/2018 08:14 AM  
 CO2 21 10/03/2018 08:14 AM  
 
Lab Results Component Value Date/Time TSH 2.000 04/18/2018 09:27 AM  
 T4, Free 1.07 04/18/2018 09:27 AM  
   
 
Objective:  
 
Visit Vitals BP (!) 143/104 (BP 1 Location: Right arm, BP Patient Position: Sitting) Pulse 63 Temp 97.9 °F (36.6 °C) (Oral) Resp 16 Ht 6' (1.829 m) Wt 315 lb 6.4 oz (143.1 kg) SpO2 96% BMI 42.78 kg/m² The patient appears well, alert, oriented x 3, in no distress, obese. ENT normal.  Neck supple. No adenopathy or thyromegaly. LIOR. Lungs are clear, good air entry, no wheezes, rhonchi or rales. S1 and S2 normal, no murmurs, regular rate and rhythm. Abdomen is soft without tenderness, guarding, mass or organomegaly.  exam: deferred. Extremities show no edema, normal peripheral pulses. Neurological is normal without focal findings. Assessment/Plan:  
healthy adult male lose weight, increase physical activity, limit alcohol consumption, follow low fat diet, follow low salt diet, continue present plan, routine labs ordered, call if any problems. ICD-10-CM ICD-9-CM 1. Well adult on routine health check Z00.00 V70.0 PSA W/ REFLX FREE PSA  
   LIPID PANEL  
   METABOLIC PANEL, COMPREHENSIVE CANCELED: HEMOGLOBIN A1C WITH EAG 2. Type 2 diabetes mellitus without complication, without long-term current use of insulin (HCC) E11.9 250.00 metFORMIN ER (GLUCOPHAGE XR) 500 mg tablet MICROALBUMIN, UR, RAND W/ MICROALB/CREAT RATIO  
   glucose blood VI test strips (FREESTYLE LITE STRIPS) strip Blood-Glucose Meter (FREESTYLE LITE METER) monitoring kit  
   lancets misc 3. Essential hypertension I10 401.9 lisinopril-hydroCHLOROthiazide (PRINZIDE, ZESTORETIC) 10-12.5 mg per tablet 4. Obesity, morbid ([facility] [address] E66.01 278.01 TSH 3RD GENERATION 5. Sleep apnea in adult G47.30 327.23   
6. Elevated hemoglobin A1c R73.09 790.29 AMB POC HEMOGLOBIN A1C Diagnoses and all orders for this visit: 
 
1. Well adult on routine health check -     PSA W/ REFLX FREE PSA 
-     LIPID PANEL 
-     METABOLIC PANEL, COMPREHENSIVE 2. Type 2 diabetes mellitus without complication, without long-term current use of insulin ([facility] [address] -     A1c is 7.0 Start metFORMIN ER (GLUCOPHAGE XR) 500 mg tablet; Take 1 Tab by mouth daily (with dinner). -     MICROALBUMIN, UR, RAND W/ MICROALB/CREAT RATIO 
-     glucose blood VI test strips (FREESTYLE LITE STRIPS) strip; Check fasting blood sugar in AM X 1. 
-     Blood-Glucose Meter (FREESTYLE LITE METER) monitoring kit; Check fasting blood sugar in AM X 1. 
-     lancets misc; Check fasting blood sugar in AM X 1. 
-    Encouraged exercise, diet. -    Yearly eye exam. 
 
3. Essential hypertension -  BP is elevated. start  lisinopril-hydroCHLOROthiazide (PRINZIDE, ZESTORETIC) 10-12.5 mg per tablet; Take 1 Tab by mouth daily. 4. Obesity, morbid ([address] -     TSH 3RD GENERATION 5. Sleep apnea in adult 
      - continue CPAP 6. Elevated hemoglobin A1c 
-     AMB POC HEMOGLOBIN A1C is 7.0 Same as #2 Follow-up and Dispositions · Return in about 3 months (around 7/18/2019), or if symptoms worsen or fail to improve, for Bring diabetic log book., Bring BP log book. follow up sooner if  BP >130/80s, Bring BP log book. . 
  
 
reviewed diet, exercise and weight control 
reviewed medications and side effects in detail. Discussed the patient's BMI with him. The BMI follow up plan is as follows:  
 
dietary management education, guidance, and counseling 
encourage exercise 
monitor weight An After Visit Summary was printed and given to the patient.

## 2019-04-19 LAB
ALBUMIN SERPL-MCNC: 4.2 G/DL (ref 3.5–5.5)
ALBUMIN/CREAT UR: <6.1 MG/G CREAT (ref 0–30)
ALBUMIN/GLOB SERPL: 1.7 {RATIO} (ref 1.2–2.2)
ALP SERPL-CCNC: 78 IU/L (ref 39–117)
ALT SERPL-CCNC: 62 IU/L (ref 0–44)
AST SERPL-CCNC: 38 IU/L (ref 0–40)
BILIRUB SERPL-MCNC: 0.6 MG/DL (ref 0–1.2)
BUN SERPL-MCNC: 12 MG/DL (ref 6–24)
BUN/CREAT SERPL: 16 (ref 9–20)
CALCIUM SERPL-MCNC: 9 MG/DL (ref 8.7–10.2)
CHLORIDE SERPL-SCNC: 102 MMOL/L (ref 96–106)
CHOLEST SERPL-MCNC: 188 MG/DL (ref 100–199)
CO2 SERPL-SCNC: 22 MMOL/L (ref 20–29)
CREAT SERPL-MCNC: 0.77 MG/DL (ref 0.76–1.27)
CREAT UR-MCNC: 49.5 MG/DL
GLOBULIN SER CALC-MCNC: 2.5 G/DL (ref 1.5–4.5)
GLUCOSE SERPL-MCNC: 126 MG/DL (ref 65–99)
HDLC SERPL-MCNC: 35 MG/DL
LDLC SERPL CALC-MCNC: 110 MG/DL (ref 0–99)
MICROALBUMIN UR-MCNC: <3 UG/ML
POTASSIUM SERPL-SCNC: 4.4 MMOL/L (ref 3.5–5.2)
PROT SERPL-MCNC: 6.7 G/DL (ref 6–8.5)
PSA SERPL-MCNC: 1.6 NG/ML (ref 0–4)
REFLEX CRITERIA: NORMAL
SODIUM SERPL-SCNC: 141 MMOL/L (ref 134–144)
TRIGL SERPL-MCNC: 214 MG/DL (ref 0–149)
TSH SERPL DL<=0.005 MIU/L-ACNC: 1.81 UIU/ML (ref 0.45–4.5)
VLDLC SERPL CALC-MCNC: 43 MG/DL (ref 5–40)

## 2019-04-19 NOTE — PROGRESS NOTES
Please call to inform him that: 1. Over all cholesterol level is elevated from last time. Triglyceride level is elevated from normal. Liver enzyme level is slightly above normal range. Please work on quitting drinking alcohol, avoid tylenol products as we have discussed in recent visit. 2. Prostate enzyme level is good.   
 
3. Kidney function and Thyroid level is normal.

## 2019-07-19 ENCOUNTER — OFFICE VISIT (OUTPATIENT)
Dept: PRIMARY CARE CLINIC | Age: 59
End: 2019-07-19

## 2019-07-19 VITALS
SYSTOLIC BLOOD PRESSURE: 106 MMHG | HEIGHT: 72 IN | RESPIRATION RATE: 17 BRPM | TEMPERATURE: 97.8 F | OXYGEN SATURATION: 98 % | WEIGHT: 297.2 LBS | HEART RATE: 79 BPM | DIASTOLIC BLOOD PRESSURE: 67 MMHG | BODY MASS INDEX: 40.26 KG/M2

## 2019-07-19 DIAGNOSIS — E11.9 TYPE 2 DIABETES MELLITUS WITHOUT COMPLICATION, WITHOUT LONG-TERM CURRENT USE OF INSULIN (HCC): Primary | ICD-10-CM

## 2019-07-19 DIAGNOSIS — E66.01 OBESITY, MORBID (HCC): ICD-10-CM

## 2019-07-19 DIAGNOSIS — I10 ESSENTIAL HYPERTENSION: ICD-10-CM

## 2019-07-19 LAB — HBA1C MFR BLD HPLC: 6.1 %

## 2019-07-19 RX ORDER — METFORMIN HYDROCHLORIDE 500 MG/1
500 TABLET, EXTENDED RELEASE ORAL
Qty: 90 TAB | Refills: 0 | Status: SHIPPED | OUTPATIENT
Start: 2019-07-19 | End: 2019-10-18 | Stop reason: SDUPTHER

## 2019-07-19 RX ORDER — LISINOPRIL 10 MG/1
10 TABLET ORAL DAILY
Qty: 90 TAB | Refills: 0 | Status: SHIPPED | OUTPATIENT
Start: 2019-07-19 | End: 2019-10-14 | Stop reason: SDUPTHER

## 2019-07-19 RX ORDER — LISINOPRIL AND HYDROCHLOROTHIAZIDE 10; 12.5 MG/1; MG/1
1 TABLET ORAL DAILY
Qty: 30 TAB | Refills: 3 | Status: CANCELLED | OUTPATIENT
Start: 2019-07-19

## 2019-07-19 NOTE — PROGRESS NOTES
Subjective:     Chief Complaint   Patient presents with    Diabetes    Hypertension        He  is a 63y.o. year old male who presents today for three months follow up for Diabetes and HTN follow up. DM-2: He was started on Metformin 500 mg XL one tab/day three months ago with a A1c of 7.0. He has been compliant with med . FBS has been running in 90-120s range. HTN: He was started on Lisinopril-HCTZ 10-12.5 mg. Reports that At home BP has been ranging 110-120/60-70s. He reports that since he started taking the med he has been experiencing urinary frequency and feels tired. Obesity: Reports that he has cut down drinking alcohol and trying to be more active. Lost 18 pounds in three months. He denies any chest pain, soa, cough, abdominal pain. Pertinent items are noted in HPI. Objective:     Vitals:    07/19/19 0808   BP: 106/67   Pulse: 79   Resp: 17   Temp: 97.8 °F (36.6 °C)   TempSrc: Oral   SpO2: 98%   Weight: 297 lb 3.2 oz (134.8 kg)   Height: 6' (1.829 m)       Physical Examination: General appearance - alert, well appearing, and in no distress, oriented to person, place, and time and overweight  Mental status - alert, oriented to person, place, and time, normal mood, behavior, speech, dress, motor activity, and thought processes  Chest - clear to auscultation, no wheezes, rales or rhonchi, symmetric air entry  Heart - normal rate, regular rhythm, normal S1, S2, no murmurs, rubs, clicks or gallops  Extremities - pedal edema 1 +    No Known Allergies   Social History     Socioeconomic History    Marital status:      Spouse name: Not on file    Number of children: Not on file    Years of education: Not on file    Highest education level: Not on file   Tobacco Use    Smoking status: Never Smoker    Smokeless tobacco: Never Used   Substance and Sexual Activity    Alcohol use:  Yes     Alcohol/week: 6.0 - 10.0 standard drinks     Types: 6 - 10 Cans of beer per week     Comment: on saturday night    Drug use: No      Family History   Problem Relation Age of Onset    Lung Disease Mother     Diabetes Mother     Diabetes Father       Past Surgical History:   Procedure Laterality Date    HX APPENDECTOMY      HX HERNIA REPAIR      HX TONSILLECTOMY        No past medical history on file. Current Outpatient Medications   Medication Sig Dispense Refill    OTHER Indications: Black Cherry Extract      metFORMIN ER (GLUCOPHAGE XR) 500 mg tablet Take 1 Tab by mouth daily (with dinner). 30 Tab 3    lisinopril-hydroCHLOROthiazide (PRINZIDE, ZESTORETIC) 10-12.5 mg per tablet Take 1 Tab by mouth daily. 30 Tab 3    aspirin delayed-release 81 mg tablet Take  by mouth daily.  multivitamin (ONE A DAY) tablet Take 1 Tab by mouth daily.  glucose blood VI test strips (FREESTYLE LITE STRIPS) strip Check fasting blood sugar in AM X 1. 50 Strip 11    Blood-Glucose Meter (FREESTYLE LITE METER) monitoring kit Check fasting blood sugar in AM X 1. 1 Kit 0    lancets misc Check fasting blood sugar in AM X 1. 50 Each 11    MELATONIN PO Take  by mouth nightly as needed. Assessment/ Plan:   Diagnoses and all orders for this visit:    1. Type 2 diabetes mellitus without complication, without long-term current use of insulin (HCC)  -     AMB POC HEMOGLOBIN A1C is 6.1, well controled with current rx. -   Continue   metFORMIN ER (GLUCOPHAGE XR) 500 mg tablet; Take 1 Tab by mouth daily (with dinner). -     glucose blood VI test strips (FREESTYLE LITE STRIPS) strip; Check fasting blood sugar in AM X 1.  -     LIPID PANEL  -     METABOLIC PANEL, COMPREHENSIVE            Goal of LDL is <70. Pt agreed to start statin if needed. 2. Essential hypertension  -   BP is symptomatically low. Stop HCTZ and continue   lisinopril (PRINIVIL, ZESTRIL) 10 mg tablet; Take 1 Tab by mouth daily. 3. Obesity, morbid ([address]     - continue avoid alcohol, encouraged to exercise 150 min/week.        Encouraged healthy diet.          Medication risks/benefits/costs/interactions/alternatives discussed with patient. Advised patient to call back or return to office if symptoms worsen/change/persist. If patient cannot reach us or should anything more severe/urgent arise he/she should proceed directly to the nearest emergency department. Discussed expected course/resolution/complications of diagnosis in detail with patient. Patient given a written after visit summary which includes her diagnoses, current medications and vitals. Patient expressed understanding with the diagnosis and plan. Follow-up and Dispositions    · Return in about 3 months (around 10/19/2019) for Bring diabetic log book., Bring BP log book. Marc Thayer

## 2019-07-20 LAB
ALBUMIN SERPL-MCNC: 4.2 G/DL (ref 3.5–5.5)
ALBUMIN/GLOB SERPL: 1.6 {RATIO} (ref 1.2–2.2)
ALP SERPL-CCNC: 68 IU/L (ref 39–117)
ALT SERPL-CCNC: 36 IU/L (ref 0–44)
AST SERPL-CCNC: 30 IU/L (ref 0–40)
BILIRUB SERPL-MCNC: 0.5 MG/DL (ref 0–1.2)
BUN SERPL-MCNC: 11 MG/DL (ref 6–24)
BUN/CREAT SERPL: 14 (ref 9–20)
CALCIUM SERPL-MCNC: 9.4 MG/DL (ref 8.7–10.2)
CHLORIDE SERPL-SCNC: 100 MMOL/L (ref 96–106)
CHOLEST SERPL-MCNC: 175 MG/DL (ref 100–199)
CO2 SERPL-SCNC: 24 MMOL/L (ref 20–29)
CREAT SERPL-MCNC: 0.78 MG/DL (ref 0.76–1.27)
GLOBULIN SER CALC-MCNC: 2.6 G/DL (ref 1.5–4.5)
GLUCOSE SERPL-MCNC: 126 MG/DL (ref 65–99)
HDLC SERPL-MCNC: 36 MG/DL
LDLC SERPL CALC-MCNC: 89 MG/DL (ref 0–99)
POTASSIUM SERPL-SCNC: 4.2 MMOL/L (ref 3.5–5.2)
PROT SERPL-MCNC: 6.8 G/DL (ref 6–8.5)
SODIUM SERPL-SCNC: 138 MMOL/L (ref 134–144)
TRIGL SERPL-MCNC: 251 MG/DL (ref 0–149)
VLDLC SERPL CALC-MCNC: 50 MG/DL (ref 5–40)

## 2019-07-22 DIAGNOSIS — E78.5 HYPERLIPIDEMIA LDL GOAL <70: Primary | ICD-10-CM

## 2019-07-22 DIAGNOSIS — E11.9 TYPE 2 DIABETES MELLITUS WITHOUT COMPLICATION, WITHOUT LONG-TERM CURRENT USE OF INSULIN (HCC): ICD-10-CM

## 2019-07-22 RX ORDER — ATORVASTATIN CALCIUM 10 MG/1
10 TABLET, FILM COATED ORAL DAILY
Qty: 90 TAB | Refills: 0 | Status: SHIPPED | OUTPATIENT
Start: 2019-07-22 | End: 2019-10-21 | Stop reason: SDUPTHER

## 2019-07-22 NOTE — PROGRESS NOTES
Please call patient;      I have already started the conversation about starting statin in last visit. LDL level is better but not at goal.  Triglyceride level is higher than last time and good cholesterol level is lower than it should be. I will like you to start on Lipitor 10 mg. The prescription has been sent to your pharmacy. Lipitor is taken  once daily with the evening meal.This type of drug is usually highly effective to lower LDL cholesterol and is usually very well tolerated. However, statin-type drugs can potentially injure the liver. Blood tests will be required every 3 months for the first 6 months of therapy, and at 6-12 month intervals thereafter. Damage to the liver, if detected early, can be reversed by stopping the drug. Be alert for persistent nausea, abdominal pain, or yellow jaundice, and report such to me directly. Statin drugs may also cause skeletal muscle injury in rare cases. Be alert for pronounced persistent diffuse muscle pain and discontinue the drug immediately should such symptoms develop. Plan to follow up in 3 months to recheck your labs.      Kidney and liver function is normal.

## 2019-10-14 DIAGNOSIS — I10 ESSENTIAL HYPERTENSION: ICD-10-CM

## 2019-10-14 RX ORDER — LISINOPRIL 10 MG/1
TABLET ORAL
Qty: 90 TAB | Refills: 0 | Status: SHIPPED | OUTPATIENT
Start: 2019-10-14 | End: 2019-10-18 | Stop reason: SDUPTHER

## 2019-10-18 ENCOUNTER — OFFICE VISIT (OUTPATIENT)
Dept: PRIMARY CARE CLINIC | Age: 59
End: 2019-10-18

## 2019-10-18 VITALS
TEMPERATURE: 97.6 F | WEIGHT: 293 LBS | OXYGEN SATURATION: 95 % | HEIGHT: 72 IN | DIASTOLIC BLOOD PRESSURE: 82 MMHG | BODY MASS INDEX: 39.68 KG/M2 | SYSTOLIC BLOOD PRESSURE: 138 MMHG | RESPIRATION RATE: 17 BRPM | HEART RATE: 65 BPM

## 2019-10-18 DIAGNOSIS — E78.5 HYPERLIPIDEMIA LDL GOAL <70: ICD-10-CM

## 2019-10-18 DIAGNOSIS — Z23 FLU VACCINE NEED: ICD-10-CM

## 2019-10-18 DIAGNOSIS — E11.9 TYPE 2 DIABETES MELLITUS WITHOUT COMPLICATION, WITHOUT LONG-TERM CURRENT USE OF INSULIN (HCC): Primary | ICD-10-CM

## 2019-10-18 DIAGNOSIS — I10 ESSENTIAL HYPERTENSION: ICD-10-CM

## 2019-10-18 LAB — HBA1C MFR BLD HPLC: 6 %

## 2019-10-18 RX ORDER — METFORMIN HYDROCHLORIDE 500 MG/1
500 TABLET, EXTENDED RELEASE ORAL
Qty: 90 TAB | Refills: 1 | Status: SHIPPED | OUTPATIENT
Start: 2019-10-18 | End: 2020-05-11

## 2019-10-18 RX ORDER — LISINOPRIL 10 MG/1
TABLET ORAL
Qty: 90 TAB | Refills: 1 | Status: SHIPPED | OUTPATIENT
Start: 2019-10-18 | End: 2020-01-16 | Stop reason: SDUPTHER

## 2019-10-18 NOTE — PROGRESS NOTES
Subjective:     Chief Complaint   Patient presents with    Diabetes     3 month f/u    Blood Pressure Check    Cholesterol Problem        He  is a 63y.o. year old male who presents today for three months follow up for Diabetes and HTN follow up.      DM-2: He is on Metformin 500 mg XL one tab/day . Last  A1c of 6.1. He has been compliant with med . FBS has been running in 90-120s range.      HTN: He is on Lisinopril 10 mg. HCTZ was stopped due to low BP. Reports that At home BP has been ranging 110-120/60-70s. He reports that since he started taking the med he has been experiencing urinary frequency and feels tired.      HLD: Did not take the Lipitor as prescribed. Neris Stoner Has been modifying his diet and trying to be more active. Obesity: Reports that he has cut down drinking alcohol and trying to be more active. Lost 18 pounds in three months.      He denies any chest pain, soa, cough, abdominal pain. Pertinent items are noted in HPI.   Objective:     Vitals:    10/18/19 0842   BP: 138/82   Pulse: 65   Resp: 17   Temp: 97.6 °F (36.4 °C)   TempSrc: Oral   SpO2: 95%   Weight: 293 lb (132.9 kg)   Height: 6' (1.829 m)       Physical Examination: General appearance - alert, well appearing, and in no distress, oriented to person, place, and time and overweight  Mental status - alert, oriented to person, place, and time, normal mood, behavior, speech, dress, motor activity, and thought processes  Chest - clear to auscultation, no wheezes, rales or rhonchi, symmetric air entry  Heart - normal rate, regular rhythm, normal S1, S2, no murmurs, rubs, clicks or gallops  Extremities - no pedal edema noted    No Known Allergies   Social History     Socioeconomic History    Marital status:      Spouse name: Not on file    Number of children: Not on file    Years of education: Not on file    Highest education level: Not on file   Tobacco Use    Smoking status: Never Smoker    Smokeless tobacco: Never Used Substance and Sexual Activity    Alcohol use: Yes     Alcohol/week: 6.0 - 10.0 standard drinks     Types: 6 - 10 Cans of beer per week     Comment: on saturday night    Drug use: No      Family History   Problem Relation Age of Onset    Lung Disease Mother     Diabetes Mother     Diabetes Father       Past Surgical History:   Procedure Laterality Date    HX APPENDECTOMY      HX HERNIA REPAIR      HX TONSILLECTOMY        No past medical history on file. Current Outpatient Medications   Medication Sig Dispense Refill    lisinopril (PRINIVIL, ZESTRIL) 10 mg tablet TAKE 1 TABLET BY MOUTH ONCE DAILY 90 Tab 0    atorvastatin (LIPITOR) 10 mg tablet Take 1 Tab by mouth daily. 90 Tab 0    metFORMIN ER (GLUCOPHAGE XR) 500 mg tablet Take 1 Tab by mouth daily (with dinner). 90 Tab 0    glucose blood VI test strips (FREESTYLE LITE STRIPS) strip Check fasting blood sugar in AM X 1. 50 Strip 11    OTHER Indications: Black Cherry Extract      Blood-Glucose Meter (FREESTYLE LITE METER) monitoring kit Check fasting blood sugar in AM X 1. 1 Kit 0    lancets misc Check fasting blood sugar in AM X 1. 50 Each 11    MELATONIN PO Take  by mouth nightly as needed.  aspirin delayed-release 81 mg tablet Take  by mouth daily.  multivitamin (ONE A DAY) tablet Take 1 Tab by mouth daily. Assessment/ Plan:   Diagnoses and all orders for this visit:    1. Type 2 diabetes mellitus without complication, without long-term current use of insulin (HCC)  -     AMB POC HEMOGLOBIN A1C is improved. Continue current therapy and continue work on diet and exercise. Last Point of Care HGB A1C  Hemoglobin A1c (POC)   Date Value Ref Range Status   10/18/2019 6.0 % Final        -     METABOLIC PANEL, COMPREHENSIVE  -     metFORMIN ER (GLUCOPHAGE XR) 500 mg tablet; Take 1 Tab by mouth daily (with dinner).     2. Essential hypertension  -     METABOLIC PANEL, COMPREHENSIVE  -  BP well controlled with  lisinopril (PRINIVIL, ZESTRIL) 10 mg tablet; TAKE 1 TABLET BY MOUTH ONCE DAILY    3. Hyperlipidemia LDL goal <70  -    Did not start Lipitor as prescribed. Continue work on diet and exercise. Will notify result and recommendations. METABOLIC PANEL, COMPREHENSIVE  -     LIPID PANEL    4. Flu vaccine need  -     INFLUENZA VIRUS VAC QUAD,SPLIT,PRESV FREE SYRINGE IM           Medication risks/benefits/costs/interactions/alternatives discussed with patient. Advised patient to call back or return to office if symptoms worsen/change/persist. If patient cannot reach us or should anything more severe/urgent arise he/she should proceed directly to the nearest emergency department. Discussed expected course/resolution/complications of diagnosis in detail with patient. Patient given a written after visit summary which includes her diagnoses, current medications and vitals. Patient expressed understanding with the diagnosis and plan. Follow-up and Dispositions    · Return in about 3 months (around 1/18/2020) for DM,HTN.

## 2019-10-19 LAB
ALBUMIN SERPL-MCNC: 4.4 G/DL (ref 3.5–5.5)
ALBUMIN/GLOB SERPL: 1.6 {RATIO} (ref 1.2–2.2)
ALP SERPL-CCNC: 74 IU/L (ref 39–117)
ALT SERPL-CCNC: 43 IU/L (ref 0–44)
AST SERPL-CCNC: 34 IU/L (ref 0–40)
BILIRUB SERPL-MCNC: 0.4 MG/DL (ref 0–1.2)
BUN SERPL-MCNC: 11 MG/DL (ref 6–24)
BUN/CREAT SERPL: 12 (ref 9–20)
CALCIUM SERPL-MCNC: 9.2 MG/DL (ref 8.7–10.2)
CHLORIDE SERPL-SCNC: 101 MMOL/L (ref 96–106)
CHOLEST SERPL-MCNC: 185 MG/DL (ref 100–199)
CO2 SERPL-SCNC: 21 MMOL/L (ref 20–29)
CREAT SERPL-MCNC: 0.89 MG/DL (ref 0.76–1.27)
GLOBULIN SER CALC-MCNC: 2.8 G/DL (ref 1.5–4.5)
GLUCOSE SERPL-MCNC: 114 MG/DL (ref 65–99)
HDLC SERPL-MCNC: 37 MG/DL
LDLC SERPL CALC-MCNC: 115 MG/DL (ref 0–99)
POTASSIUM SERPL-SCNC: 4.7 MMOL/L (ref 3.5–5.2)
PROT SERPL-MCNC: 7.2 G/DL (ref 6–8.5)
SODIUM SERPL-SCNC: 139 MMOL/L (ref 134–144)
TRIGL SERPL-MCNC: 163 MG/DL (ref 0–149)
VLDLC SERPL CALC-MCNC: 33 MG/DL (ref 5–40)

## 2019-10-21 ENCOUNTER — TELEPHONE (OUTPATIENT)
Dept: PRIMARY CARE CLINIC | Age: 59
End: 2019-10-21

## 2019-10-21 DIAGNOSIS — E11.9 TYPE 2 DIABETES MELLITUS WITHOUT COMPLICATION, WITHOUT LONG-TERM CURRENT USE OF INSULIN (HCC): ICD-10-CM

## 2019-10-21 DIAGNOSIS — E78.5 HYPERLIPIDEMIA LDL GOAL <70: ICD-10-CM

## 2019-10-21 RX ORDER — ATORVASTATIN CALCIUM 10 MG/1
10 TABLET, FILM COATED ORAL DAILY
Qty: 90 TAB | Refills: 0 | Status: SHIPPED | OUTPATIENT
Start: 2019-10-21 | End: 2020-01-16 | Stop reason: SDUPTHER

## 2019-10-21 NOTE — TELEPHONE ENCOUNTER
----- Message from David Zacarias MD sent at 10/21/2019  4:00 PM EDT -----  Please call patient; Cholesterol level went higher than last time. Please start taking Lipitor as we discussed. Prescription resent to the pharmacy. Follow up in three months.    Kidney and liver function is normal

## 2019-10-21 NOTE — PROGRESS NOTES
Please call patient; Cholesterol level went higher than last time. Please start taking Lipitor as we discussed. Prescription resent to the pharmacy. Follow up in three months.    Kidney and liver function is normal

## 2020-01-16 DIAGNOSIS — E78.5 HYPERLIPIDEMIA LDL GOAL <70: ICD-10-CM

## 2020-01-16 DIAGNOSIS — I10 ESSENTIAL HYPERTENSION: ICD-10-CM

## 2020-01-16 DIAGNOSIS — E11.9 TYPE 2 DIABETES MELLITUS WITHOUT COMPLICATION, WITHOUT LONG-TERM CURRENT USE OF INSULIN (HCC): ICD-10-CM

## 2020-01-16 RX ORDER — LISINOPRIL 10 MG/1
TABLET ORAL
Qty: 90 TAB | Refills: 1 | Status: SHIPPED | OUTPATIENT
Start: 2020-01-16 | End: 2020-02-11 | Stop reason: SINTOL

## 2020-01-16 RX ORDER — ATORVASTATIN CALCIUM 10 MG/1
10 TABLET, FILM COATED ORAL DAILY
Qty: 90 TAB | Refills: 0 | Status: SHIPPED | OUTPATIENT
Start: 2020-01-16 | End: 2020-05-11

## 2020-01-16 NOTE — TELEPHONE ENCOUNTER
----- Message from Rosaura Ramos sent at 1/16/2020  2:15 PM EST -----  Regarding: Dr. Ede Gillespie: [phone]  Caller (if not patient): n/a  Relationship of caller (if not patient): self  Best contact number(s): [phone]  Name of medication and dosage if known: \"Lisinopril 10mg\";   \"Atorvastatin Calcium 10mg\"  Is patient out of this medication (yes/no): no  Pharmacy name: [facility]; [address], at [facility] and [address] listed in chart? (yes/no): yes  Pharmacy phone number: [phone]  Date of last visit: 10/18/19  Details to clarify the request: Pt states that he will be out of these medicines on tomorrow, Fri. 1/17/20.

## 2020-01-21 ENCOUNTER — OFFICE VISIT (OUTPATIENT)
Dept: PRIMARY CARE CLINIC | Age: 60
End: 2020-01-21

## 2020-01-21 VITALS
DIASTOLIC BLOOD PRESSURE: 72 MMHG | OXYGEN SATURATION: 95 % | WEIGHT: 300 LBS | HEART RATE: 56 BPM | BODY MASS INDEX: 40.63 KG/M2 | HEIGHT: 72 IN | SYSTOLIC BLOOD PRESSURE: 130 MMHG | RESPIRATION RATE: 18 BRPM | TEMPERATURE: 97.9 F

## 2020-01-21 DIAGNOSIS — E78.5 HYPERLIPIDEMIA LDL GOAL <70: ICD-10-CM

## 2020-01-21 DIAGNOSIS — E66.01 OBESITY, MORBID (HCC): ICD-10-CM

## 2020-01-21 DIAGNOSIS — I10 ESSENTIAL HYPERTENSION: ICD-10-CM

## 2020-01-21 DIAGNOSIS — E11.9 TYPE 2 DIABETES MELLITUS WITHOUT COMPLICATION, WITHOUT LONG-TERM CURRENT USE OF INSULIN (HCC): Primary | ICD-10-CM

## 2020-01-21 LAB — HBA1C MFR BLD HPLC: 5.7 %

## 2020-01-21 NOTE — PROGRESS NOTES
Subjective:     Chief Complaint   Patient presents with    Diabetes     3 mos f/u    Blood Pressure Check    Cholesterol Problem     fasting        He  is a 62y.o. year old male who presents for three months follow up for Diabetes and HTN follow up.      DM-2: He is on Metformin 500 mg XL one tab/day . Last  A1c of 6.0. He has been compliant with med . No hypoglycemic events.      HTN: He is on Lisinopril 10 mg. HCTZ was stopped due to low BP. Reports that At home BP has been ranging 110-120/60-70s.       HLD: has been taking Lipitor as prescribed for the past three months. . Has been modifying his diet and trying to be more active.      Obesity: He is planning to start [employer] in February. Denies any chest pain, soa, cough, abdominal pain. Pertinent items are noted in HPI. Objective:     Vitals:    01/21/20 0820   BP: 130/72   Pulse: (!) 56   Resp: 18   Temp: 97.9 °F (36.6 °C)   TempSrc: Oral   SpO2: 95%   Weight: 300 lb (136.1 kg)   Height: 6' (1.829 m)       Physical Examination: General appearance - alert, well appearing, and in no distress, oriented to person, place, and time and overweight  Mental status - alert, oriented to person, place, and time, normal mood, behavior, speech, dress, motor activity, and thought processes  Chest - clear to auscultation, no wheezes, rales or rhonchi, symmetric air entry  Heart - normal rate, regular rhythm, normal S1, S2, no murmurs, rubs, clicks or gallops    No Known Allergies   Social History     Socioeconomic History    Marital status:      Spouse name: Not on file    Number of children: Not on file    Years of education: Not on file    Highest education level: Not on file   Tobacco Use    Smoking status: Never Smoker    Smokeless tobacco: Never Used   Substance and Sexual Activity    Alcohol use: Yes     Alcohol/week: 6.0 - 10.0 standard drinks     Types: 6 - 10 Cans of beer per week     Comment: on saturday night    Drug use:  No Family History   Problem Relation Age of Onset    Lung Disease Mother     Diabetes Mother     Diabetes Father       Past Surgical History:   Procedure Laterality Date    HX APPENDECTOMY      HX HERNIA REPAIR      HX TONSILLECTOMY        No past medical history on file. Current Outpatient Medications   Medication Sig Dispense Refill    lisinopril (PRINIVIL, ZESTRIL) 10 mg tablet TAKE 1 TABLET BY MOUTH ONCE DAILY 90 Tab 1    atorvastatin (LIPITOR) 10 mg tablet Take 1 Tab by mouth daily. 90 Tab 0    metFORMIN ER (GLUCOPHAGE XR) 500 mg tablet Take 1 Tab by mouth daily (with dinner). 90 Tab 1    glucose blood VI test strips (FREESTYLE LITE STRIPS) strip Check fasting blood sugar in AM X 1. 50 Strip 11    OTHER Indications: Black Cherry Extract      Blood-Glucose Meter (FREESTYLE LITE METER) monitoring kit Check fasting blood sugar in AM X 1. 1 Kit 0    lancets misc Check fasting blood sugar in AM X 1. 50 Each 11    MELATONIN PO Take  by mouth nightly as needed.  aspirin delayed-release 81 mg tablet Take  by mouth daily.  multivitamin (ONE A DAY) tablet Take 1 Tab by mouth daily. Assessment/ Plan:   Diagnoses and all orders for this visit:    1. Type 2 diabetes mellitus without complication, without long-term current use of insulin (HCC)  -     AMB POC HEMOGLOBIN A1C is 5.7, well controlled with Metformin.   -     METABOLIC PANEL, COMPREHENSIVE    2. Essential hypertension  -    BP well controlled with Lisinopril. METABOLIC PANEL, COMPREHENSIVE    3. Hyperlipidemia LDL goal <70  -     LIPID PANEL  -     METABOLIC PANEL, COMPREHENSIVE              Continue Lipitor. 4. Obesity, morbid ([address]        -  Continue work on diet and exercise. Medication risks/benefits/costs/interactions/alternatives discussed with patient.   Advised patient to call back or return to office if symptoms worsen/change/persist. If patient cannot reach us or should anything more severe/urgent arise he/she should proceed directly to the nearest emergency department. Discussed expected course/resolution/complications of diagnosis in detail with patient. Patient given a written after visit summary which includes her diagnoses, current medications and vitals. Patient expressed understanding with the diagnosis and plan. Follow-up and Dispositions    · Return in about 3 months (around 4/21/2020), or if symptoms worsen or fail to improve, for complete physical  and fasting blood work. Ish Cheng

## 2020-01-21 NOTE — PROGRESS NOTES
Gerhardt Gillis is a 62 y.o. male    Chief Complaint   Patient presents with    Diabetes     3 mos f/u    Blood Pressure Check    Cholesterol Problem     fasting       1. Have you been to the ER, urgent care clinic since your last visit? Hospitalized since your last visit? No    2. Have you seen or consulted any other health care providers outside of the [address] [place] [address] since your last visit? Include any pap smears or colon screening. No    No flowsheet data found.      Health Maintenance Due   Topic Date Due    Pneumococcal 0-64 years (1 of 1 - PPSV23) 01/20/1967    FOOT EXAM Q1  01/20/1971    Shingrix Vaccine Age 50> (1 of 2) 01/20/2011

## 2020-01-22 LAB
ALBUMIN SERPL-MCNC: 4.4 G/DL (ref 3.8–4.9)
ALBUMIN/GLOB SERPL: 1.9 {RATIO} (ref 1.2–2.2)
ALP SERPL-CCNC: 70 IU/L (ref 39–117)
ALT SERPL-CCNC: 40 IU/L (ref 0–44)
AST SERPL-CCNC: 29 IU/L (ref 0–40)
BILIRUB SERPL-MCNC: 0.6 MG/DL (ref 0–1.2)
BUN SERPL-MCNC: 14 MG/DL (ref 6–24)
BUN/CREAT SERPL: 16 (ref 9–20)
CALCIUM SERPL-MCNC: 9.3 MG/DL (ref 8.7–10.2)
CHLORIDE SERPL-SCNC: 102 MMOL/L (ref 96–106)
CHOLEST SERPL-MCNC: 140 MG/DL (ref 100–199)
CO2 SERPL-SCNC: 24 MMOL/L (ref 20–29)
CREAT SERPL-MCNC: 0.86 MG/DL (ref 0.76–1.27)
GLOBULIN SER CALC-MCNC: 2.3 G/DL (ref 1.5–4.5)
GLUCOSE SERPL-MCNC: 123 MG/DL (ref 65–99)
HDLC SERPL-MCNC: 40 MG/DL
LDLC SERPL CALC-MCNC: 63 MG/DL (ref 0–99)
POTASSIUM SERPL-SCNC: 5 MMOL/L (ref 3.5–5.2)
PROT SERPL-MCNC: 6.7 G/DL (ref 6–8.5)
SODIUM SERPL-SCNC: 139 MMOL/L (ref 134–144)
TRIGL SERPL-MCNC: 184 MG/DL (ref 0–149)
VLDLC SERPL CALC-MCNC: 37 MG/DL (ref 5–40)

## 2020-01-22 NOTE — PROGRESS NOTES
Please mail letter:    1. Cholesterol level is great. Please continue work on diet and exercise and continue on Lipitor .     2. Kidney and liver function is normal.

## 2020-02-11 DIAGNOSIS — I10 ESSENTIAL HYPERTENSION: Primary | ICD-10-CM

## 2020-02-11 RX ORDER — LOSARTAN POTASSIUM 25 MG/1
25 TABLET ORAL DAILY
Qty: 90 TAB | Refills: 0 | Status: SHIPPED | OUTPATIENT
Start: 2020-02-11 | End: 2020-05-11

## 2020-02-11 NOTE — TELEPHONE ENCOUNTER
Pt stated the side effect of lisinopril is making him cough really bad. Requested to change the medication or some suggestion from the provider to solve the issue.

## 2020-05-11 DIAGNOSIS — I10 ESSENTIAL HYPERTENSION: ICD-10-CM

## 2020-05-11 RX ORDER — LOSARTAN POTASSIUM 25 MG/1
TABLET ORAL
Qty: 90 TAB | Refills: 0 | Status: SHIPPED | OUTPATIENT
Start: 2020-05-11 | End: 2020-07-21 | Stop reason: SDUPTHER

## 2020-05-19 ENCOUNTER — TELEPHONE (OUTPATIENT)
Dept: PRIMARY CARE CLINIC | Age: 60
End: 2020-05-19

## 2020-05-19 NOTE — TELEPHONE ENCOUNTER
He should have a sleep specialist as far as I know. If he does not then he needs to make appointment with one for the supplies. You can provide [address] sleep specialist info.

## 2020-05-20 NOTE — TELEPHONE ENCOUNTER
Patient notified to contact sleep disorder center, and was given contact information [phone]. He verbalized his understanding and thanked me.

## 2020-06-24 ENCOUNTER — TELEPHONE (OUTPATIENT)
Dept: PRIMARY CARE CLINIC | Age: 60
End: 2020-06-24

## 2020-06-24 NOTE — TELEPHONE ENCOUNTER
Patient needs a referral to get a sleep study done. His sleep habits have changed and has lost about 42 lbs.  Also needs new equipment

## 2020-06-24 NOTE — TELEPHONE ENCOUNTER
Patient states he is at work today and cannot do VV today but can do it tomorrow morning. Patient is scheduled for VV tomorrow morning at 0930.

## 2020-06-25 ENCOUNTER — VIRTUAL VISIT (OUTPATIENT)
Dept: PRIMARY CARE CLINIC | Age: 60
End: 2020-06-25

## 2020-06-25 DIAGNOSIS — G47.30 SLEEP APNEA, UNSPECIFIED TYPE: Primary | ICD-10-CM

## 2020-06-25 NOTE — PROGRESS NOTES
Brittnee Fajardo is a 62 y.o. male who was seen by synchronous (real-time) audio-video technology on 6/25/2020. Consent: Brittnee Fajardo, who was seen by synchronous (real-time) audio-video technology, and/or his healthcare decision maker, is aware that this patient-initiated, Telehealth encounter on 6/25/2020 is a billable service, with coverage as determined by his insurance carrier. He is aware that he may receive a bill and has provided verbal consent to proceed: Yes. Assessment & Plan:   Diagnoses and all orders for this visit:    1. Sleep apnea, unspecified type  -     SLEEP MEDICINE REFERRAL               Follow-up and Dispositions    · Return for as scheduled. .           Subjective:   Brittnee Fajardo is a 62 y.o. male who was seen for Other (Sleep apnea. Need a sleep specialist referral. )    This is a 63 y/o male with hx of HTN, DM, HLD is here to get a referral for sleep medicine specialist. Patient is currently using CPAP machine however now the supplies are not fitting well in his face since he lost about 40 pounds. Denies any chest pain, cough, palpitation. Prior to Admission medications    Medication Sig Start Date End Date Taking? Authorizing Provider   losartan (COZAAR) 25 mg tablet Take 1 tablet by mouth once daily 5/11/20  Yes Anila Aldana MD   metFORMIN ER (GLUCOPHAGE XR) 500 mg tablet TAKE 1 TABLET BY MOUTH ONCE DAILY WITH DINNER 5/11/20  Yes Enmanuel Tavares MD   atorvastatin (LIPITOR) 10 mg tablet Take 1 Tab by mouth daily.  5/11/20  Yes Anila Aldana MD   glucose blood VI test strips (FREESTYLE LITE STRIPS) strip Check fasting blood sugar in AM X 1. 7/19/19   Enmanuel Tavares MD   OTHER Indications: Black Cherry Extract    Provider, Historical   Blood-Glucose Meter (FREESTYLE LITE METER) monitoring kit Check fasting blood sugar in AM X 1. 4/18/19   Enmanuel Tavares MD   lancets misc Check fasting blood sugar in AM X 1. 4/18/19   Enmanuel Tavares MD   MELATONIN PO Take  by mouth nightly as needed. Provider, Historical   aspirin delayed-release 81 mg tablet Take  by mouth daily. Provider, Historical   multivitamin (ONE A DAY) tablet Take 1 Tab by mouth daily. Provider, Historical     Allergies   Allergen Reactions    Lisinopril Cough       Patient Active Problem List   Diagnosis Code    Sleep apnea in adult G47.30    Obesity, morbid ([facility] [address] E66.01    Type 2 diabetes mellitus without complication, without long-term current use of insulin ([facility]) E11.9    Essential hypertension I10     Current Outpatient Medications   Medication Sig Dispense Refill    losartan (COZAAR) 25 mg tablet Take 1 tablet by mouth once daily 90 Tab 0    metFORMIN ER (GLUCOPHAGE XR) 500 mg tablet TAKE 1 TABLET BY MOUTH ONCE DAILY WITH DINNER 90 Tab 0    atorvastatin (LIPITOR) 10 mg tablet Take 1 Tab by mouth daily. 90 Tab 1    glucose blood VI test strips (FREESTYLE LITE STRIPS) strip Check fasting blood sugar in AM X 1. 50 Strip 11    OTHER Indications: Black Cherry Extract      Blood-Glucose Meter (FREESTYLE LITE METER) monitoring kit Check fasting blood sugar in AM X 1. 1 Kit 0    lancets misc Check fasting blood sugar in AM X 1. 50 Each 11    MELATONIN PO Take  by mouth nightly as needed.  aspirin delayed-release 81 mg tablet Take  by mouth daily.  multivitamin (ONE A DAY) tablet Take 1 Tab by mouth daily. Allergies   Allergen Reactions    Lisinopril Cough     No past medical history on file. Review of Systems   Respiratory: Negative for cough. Cardiovascular: Negative for chest pain and palpitations. Objective:   Vital Signs: (As obtained by patient/caregiver at home)  There were no vitals taken for this visit.      [INSTRUCTIONS:  \"[x]\" Indicates a positive item  \"[]\" Indicates a negative item  -- DELETE ALL ITEMS NOT EXAMINED]    Constitutional: [x] Appears well-developed and well-nourished [x] No apparent distress      [] Abnormal -     Mental status: [x] Alert and awake  [x] Oriented to person/place/time [x] Able to follow commands    [] Abnormal -     Eyes:   EOM    [x]  Normal    [] Abnormal -   Sclera  [x]  Normal    [] Abnormal -          Discharge [x]  None visible   [] Abnormal -     HENT: [x] Normocephalic, atraumatic  [] Abnormal -   [x] Mouth/Throat: Mucous membranes are moist    External Ears [x] Normal  [] Abnormal -    Neck: [x] No visualized mass [] Abnormal -     Pulmonary/Chest: [x] Respiratory effort normal   [x] No visualized signs of difficulty breathing or respiratory distress        [] Abnormal -      Musculoskeletal:   [x] Normal gait with no signs of ataxia         [x] Normal range of motion of neck        [] Abnormal -     Neurological:        [x] No Facial Asymmetry (Cranial nerve 7 motor function) (limited exam due to video visit)          [x] No gaze palsy        [] Abnormal -          Skin:        [x] No significant exanthematous lesions or discoloration noted on facial skin         [] Abnormal -            Psychiatric:       [x] Normal Affect [] Abnormal -        [] No Hallucinations    Other pertinent observable physical exam findings:-        We discussed the expected course, resolution and complications of the diagnosis(es) in detail. Medication risks, benefits, costs, interactions, and alternatives were discussed as indicated. I advised him to contact the office if his condition worsens, changes or fails to improve as anticipated. He expressed understanding with the diagnosis(es) and plan. Vianney Saldivar is a 62 y.o. male who was evaluated by a video visit encounter for concerns as above. Patient identification was verified prior to start of the visit. A caregiver was present when appropriate. Due to this being a TeleHealth encounter (During [facility] public health emergency), evaluation of the following organ systems was limited: Vitals/Constitutional/EENT/Resp/CV/GI//MS/Neuro/Skin/Heme-Lymph-Imm.   Pursuant to the emergency declaration under the [facility] waiver authority and the [employer] Act, this Virtual  Visit was conducted, with patient's (and/or legal guardian's) consent, to reduce the patient's risk of exposure to COVID-19 and provide necessary medical care. Services were provided through a video synchronous discussion virtually to substitute for in-person clinic visit.        Adrien Smith MD

## 2020-06-29 ENCOUNTER — VIRTUAL VISIT (OUTPATIENT)
Dept: SLEEP MEDICINE | Age: 60
End: 2020-06-29

## 2020-06-29 DIAGNOSIS — G47.33 OSA (OBSTRUCTIVE SLEEP APNEA): Primary | ICD-10-CM

## 2020-06-29 DIAGNOSIS — I10 ESSENTIAL HYPERTENSION: ICD-10-CM

## 2020-06-29 NOTE — PATIENT INSTRUCTIONS
[address] [place]. [address] [facility], [address] Tel.  [phone] Fax. [address] [facility] 60 [place], [address] [facility] Tel.  [phone] Fax. [phone] [address] Tel.  [phone] Fax. [phone] Sleep Apnea: After Your Visit Your Care Instructions Sleep apnea occurs when you frequently stop breathing for 10 seconds or longer during sleep. It can be mild to severe, based on the number of times per hour that you stop breathing or have slowed breathing. Blocked or narrowed airways in your nose, mouth, or throat can cause sleep apnea. Your airway can become blocked when your throat muscles and tongue relax during sleep. Sleep apnea is common, occurring in 1 out of 20 individuals. Individuals having any of the following characteristics should be evaluated and treated right away due to high risk and detrimental consequences from untreated sleep apnea: 
1. Obesity 2. Congestive Heart failure 3. Atrial Fibrillation 4. Uncontrolled Hypertension 5. Type II Diabetes 6. Night-time Arrhythmias 7. Stroke 8. Pulmonary Hypertension 9. High-risk Driving Populations (pilots, truck drivers, etc.) 10. Patients Considering Weight-loss Surgery How do you know you have sleep apnea? You probably have sleep apnea if you answer 'yes' to 3 or more of the following questions: S - Have you been told that you Snore? T - Are you often Tired during the day? O - Has anyone Observed you stop breathing while sleeping? P- Do you have (or are being treated for) high blood Pressure? B - Are you obese (Body Mass Index > 35)? A - Is your Age 48years old or older? N - Is your Neck size greater than 16 inches? G - Are you male Gender? A sleep physician can prescribe a breathing device that prevents tissues in the throat from blocking your airway.  Or your doctor may recommend using a dental device (oral breathing device) to help keep your airway open. In some cases, surgery may be needed to remove enlarged tissues in the throat. Follow-up care is a key part of your treatment and safety. Be sure to make and go to all appointments, and call your doctor if you are having problems. It's also a good idea to know your test results and keep a list of the medicines you take. How can you care for yourself at home? · Lose weight, if needed. It may reduce the number of times you stop breathing or have slowed breathing. · Go to bed at the same time every night. · Sleep on your side. It may stop mild apnea. If you tend to roll onto your back, sew a pocket in the back of your pajama top. Put a tennis ball into the pocket, and stitch the pocket shut. This will help keep you from sleeping on your back. · Avoid alcohol and medicines such as sleeping pills and sedatives before bed. · Do not smoke. Smoking can make sleep apnea worse. If you need help quitting, talk to your doctor about stop-smoking programs and medicines. These can increase your chances of quitting for good. · Prop up the head of your bed 4 to 6 inches by putting bricks under the legs of the bed. · Treat breathing problems, such as a stuffy nose, caused by a cold or allergies. · Use a continuous positive airway pressure (CPAP) breathing machine if lifestyle changes do not help your apnea and your doctor recommends it. The machine keeps your airway from closing when you sleep. · If CPAP does not help you, ask your doctor whether you should try other breathing machines. A bilevel positive airway pressure machine has two types of air pressureâone for breathing in and one for breathing out. Another device raises or lowers air pressure as needed while you breathe. · If your nose feels dry or bleeds when using one of these machines, talk with your doctor about increasing moisture in the air. A humidifier may help.  
· If your nose is runny or stuffy from using a breathing machine, talk with your doctor about using decongestants or a corticosteroid nasal spray. When should you call for help? Watch closely for changes in your health, and be sure to contact your doctor if: 
· You still have sleep apnea even though you have made lifestyle changes. · You are thinking of trying a device such as CPAP. · You are having problems using a CPAP or similar machine. Where can you learn more? Go to [employer]. Enter J002 in the search box to learn more about \"Sleep Apnea: After Your Visit. \"  
© 2783-7712 Healthwise, Incorporated. Care instructions adapted under license by [employer] (which disclaims liability or warranty for this information). This care instruction is for use with your licensed healthcare professional. If you have questions about a medical condition or this instruction, always ask your healthcare professional. Ciera Flavin any warranty or liability for your use of this information. PROPER SLEEP HYGIENE What to avoid · Do not have drinks with caffeine, such as coffee or black tea, for 8 hours before bed. · Do not smoke or use other types of tobacco near bedtime. Nicotine is a stimulant and can keep you awake. · Avoid drinking alcohol late in the evening, because it can cause you to wake in the middle of the night. · Do not eat a big meal close to bedtime. If you are hungry, eat a light snack. · Do not drink a lot of water close to bedtime, because the need to urinate may wake you up during the night. · Do not read or watch TV in bed. Use the bed only for sleeping and sexual activity. What to try · Go to bed at the same time every night, and wake up at the same time every morning. Do not take naps during the day. · Keep your bedroom quiet, dark, and cool. · Get regular exercise, but not within 3 to 4 hours of your bedtime. Adolfo Saavedra · Sleep on a comfortable pillow and mattress. · If watching the clock makes you anxious, turn it facing away from you so you cannot see the time. · If you worry when you lie down, start a worry book. Well before bedtime, write down your worries, and then set the book and your concerns aside. · Try meditation or other relaxation techniques before you go to bed. · If you cannot fall asleep, get up and go to another room until you feel sleepy. Do something relaxing. Repeat your bedtime routine before you go to bed again. · Make your house quiet and calm about an hour before bedtime. Turn down the lights, turn off the TV, log off the computer, and turn down the volume on music. This can help you relax after a busy day. Drowsy Driving The [place] [address] cites drowsiness as a causing factor in more than 544,277 police reported crashes annually, resulting in 76,000 injuries and 1,500 deaths. Other surveys suggest 55% of people polled have driven while drowsy in the past year, 23% had fallen asleep but not crashed, 3% crashed, and 2% had and accident due to drowsy driving. Who is at risk? Young Drivers: One study of drowsy driving accidents states that 55% of the drivers were under 25 years. Of those, 75% were male. Shift Workers and Travelers: People who work overnight or travel across time zones frequently are at higher risk of experiencing Circadian Rhythm Disorders. They are trying to work and function when their body is programed to sleep. Sleep Deprived: Lack of sleep has a serious impact on your ability to pay attention or focus on a task. Consistently getting less than the average of 8 hours your body needs creates partial or cumulative sleep deprivation. Untreated Sleep Disorders: Sleep Apnea, Narcolepsy, R.L.S., and other sleep disorders (untreated) prevent a person from getting enough restful sleep.  This leads to excessive daytime sleepiness and increases the risk for drowsy driving accidents by up to 7 times. Medications / Alcohol: Even over the counter medications can cause drowsiness. Medications that impair a drivers attention should have a warning label. Alcohol naturally makes you sleepy and on its own can cause accidents. Combined with excessive drowsiness its effects are amplified. Signs of Drowsy Driving: * You don't remember driving the last few miles * You may drift out of your jaki * You are unable to focus and your thoughts wander * You may yawn more often than normal 
 * You have difficulty keeping your eyes open / nodding off * Missing traffic signs, speeding, or tailgating Prevention-  
Good sleep hygiene, lifestyle and behavioral choices have the most impact on drowsy driving. There is no substitute for sleep and the average person requires 8 hours nightly. If you find yourself driving drowsy, stop and sleep. Consider the sleep hygiene tips provided during your visit as well. Medication Refill Policy: Refills for all medications require 1 week advance notice. Please have your pharmacy fax a refill request. We are unable to fax, or call in \"controled substance\" medications and you will need to pick these prescriptions up from our office. Jambool Activation Thank you for requesting access to Jambool. Please follow the instructions below to securely access and download your online medical record. Jambool allows you to send messages to your doctor, view your test results, renew your prescriptions, schedule appointments, and more. How Do I Sign Up? 1. In your internet browser, go to [email or web address] [email or web address]. 2. Click on the First Time User? Click Here link in the Sign In box. You will see the New Member Sign Up page. 3. Enter your Jambool Access Code exactly as it appears below. You will not need to use this code after youve completed the sign-up process.  If you do not sign up before the expiration date, you must request a new code. [email or web address] Access Code: Activation code not generated Current Ener.co Status: Active (This is the date your [email or web address] access code will ) 4. Enter the last four digits of your Social Security Number (xxxx) and Date of Birth (mm/dd/yyyy) as indicated and click Submit. You will be taken to the next sign-up page. 5. Create a Runivermagt ID. This will be your Ener.co login ID and cannot be changed, so think of one that is secure and easy to remember. 6. Create a E[employer]co password. You can change your password at any time. 7. Enter your Password Reset Question and Answer. This can be used at a later time if you forget your password. 8. Enter your e-mail address. You will receive e-mail notification when new information is available in [address] 9. Click Sign Up. You can now view and download portions of your medical record. 10. Click the Download Summary menu link to download a portable copy of your medical information. Additional Information If you have questions, please call [phone]. Remember, Ener.co is NOT to be used for urgent needs. For medical emergencies, dial 911.

## 2020-06-29 NOTE — PROGRESS NOTES
[address] [facility] [place]. [place], [address]  Tel.  [phone]  Fax. 100 [facility] 60  [place], [address] [facility]  Tel.  [phone]  Fax. [phone] [address]  Tel.  [phone]  Fax. [phone]         Subjective:    Cl Monk is a 62 y.o. male who was seen by synchronous (real-time) audio-video technology on 6/29/2020. Consent:  He is aware that this patient-initiated Telehealth encounter is a billable service, with coverage as determined by his insurance carrier. He is aware that he may receive a bill and has provided verbal consent to proceed: Yes    I was at home while conducting this encounter. Patient verified with 's License. He complains of snoring associated with periods of not breathing, excessive daytime sleepiness. Symptoms began several years ago, he was diagnosed with ANIL in 2007 / 2008 and has been on PAP therapy (last device REMstar Pro about 5 years - nasal mask, non-heated) since that time. He has lost weight about 45 lbs since he joined weight [employer] in February 2020. He is interested in re-testing to see if he still needs PAP therapy. He usually can fall asleep in 5 minutes. Family or house members note snoring. He denies completely or partially paralyzed while falling asleep or waking up. Cl Monk does not wake up frequently at night. He is not  bothered by waking up too early and left unable to get back to sleep. He actually sleeps about 7 hours at night and wakes up about 0-1 times during the night. He does not work shifts:  . Adelaide Dupont indicates he does not get too little sleep at night. His bedtime is 9:00 pm. He awakens at 5:30. He does take naps. He takes 0-1 naps a week lasting an hour. He has the following observed behaviors:   He denies of an urge to move extremities due to discomfort or other sensation and denies of dream enactment behavior.       Other remarks: Mitchell Sleepiness Score: 11   and Modified F.O.S.Q. Score Total / 2: 17       Allergies   Allergen Reactions    Lisinopril Cough         Current Outpatient Medications:     losartan (COZAAR) 25 mg tablet, Take 1 tablet by mouth once daily, Disp: 90 Tab, Rfl: 0    metFORMIN ER (GLUCOPHAGE XR) 500 mg tablet, TAKE 1 TABLET BY MOUTH ONCE DAILY WITH DINNER, Disp: 90 Tab, Rfl: 0    atorvastatin (LIPITOR) 10 mg tablet, Take 1 Tab by mouth daily. , Disp: 90 Tab, Rfl: 1    glucose blood VI test strips (FREESTYLE LITE STRIPS) strip, Check fasting blood sugar in AM X 1., Disp: 50 Strip, Rfl: 11    OTHER, Indications: Black Cherry Extract, Disp: , Rfl:     Blood-Glucose Meter (FREESTYLE LITE METER) monitoring kit, Check fasting blood sugar in AM X 1., Disp: 1 Kit, Rfl: 0    lancets misc, Check fasting blood sugar in AM X 1., Disp: 50 Each, Rfl: 11    MELATONIN PO, Take  by mouth nightly as needed. , Disp: , Rfl:     aspirin delayed-release 81 mg tablet, Take  by mouth daily. , Disp: , Rfl:     multivitamin (ONE A DAY) tablet, Take 1 Tab by mouth daily. , Disp: , Rfl:      He  has a past medical history of Hypertension. He  has a past surgical history that includes hx appendectomy; hx hernia repair; and hx tonsillectomy. He family history includes Cancer in his mother; Diabetes in his father and mother; Heart Disease in his father; Lung Disease in his father and mother. He  reports that he has never smoked. He has never used smokeless tobacco. He reports current alcohol use of about 4.0 standard drinks of alcohol per week. He reports that he does not use drugs.      Review of Systems:  Constitutional:  significant weight loss   Eyes:  No blurred vision  CVS:  No significant chest pain  Pulm:  No significant shortness of breath  GI:  No significant nausea or vomiting  :  No significant nocturia  Musculoskeletal:  No significant joint pain at night  Skin:  No significant rashes  Neuro:  No significant dizziness   Psych:  No active mood issues    Sleep Review of Systems: notable for no difficulty falling asleep; infrequent awakenings at night;  regular dreaming noted; no nightmares ; no early morning headaches; no memory problems; no concentration issues; no history of any automobile or occupational accidents due to daytime drowsiness. Objective:     Patient-Reported Vitals 6/29/2020   Patient-Reported Weight 265 lbs   Patient-Reported Height 6'    Patient-Reported Systolic  504   Patient-Reported Diastolic 72       Physical Exam completed by visual and auditory observation of patient with verbal input from patient. General:   Alert, oriented, not in acute distress   Eyes:  Anicteric Sclerae; no obvious strabismus   Nose:  No obvious nasal septum deviation    Neck:   Midline trachea, no visible mass   Chest/Lungs:  Respiratory effort normal, no visualized signs of difficulty breathing or respiratory distress   CVS:  No JVD   Extremities:  No obvious rashes noted on face, neck, or hands   Neuro:  No facial asymmetry, no focal deficits; no obvious tremor    Psych:  Normal affect,  normal countenance       Assessment:       ICD-10-CM ICD-9-CM    1. ANIL (obstructive sleep apnea) G47.33 327.23 SLEEP STUDY UNATTENDED, 4 CHANNEL   2. Essential hypertension I10 401.9    3. Adult BMI 35.0-35.9 kg/sq m Z68.35 V85.35          Plan:        Sleep testing was ordered for evaluation due to weight loss.  He was provided information on sleep apnea including coresponding risk factors and the importance of proper treatment.  Treatment options if indicated were reviewed today. Patient agrees to a trial of PAP therapy (Respironics device 9-15) if indicated.  Counseling was provided regarding proper sleep hygiene, appropriate sleep schedule, need for sleep environment safety and safe driving.    Recommended a dedicated weight loss program through appropriate diet and exercise regimen as significant weight reduction has been shown to reduce severity of obstructive sleep apnea. Patient's phone number [phone] (cell) was reviewed and confirmed for accuracy. He gives permission for messages regarding results and appointments to be left at that number. Pursuant to the emergency declaration under the [address] [place] [address], [facility] waiver authority and the [employer] and [employer] Act, this Virtual Visit was conducted, with patient's consent, to reduce the patient's risk of exposure to COVID-19 and provide continuity of care for an established patient. Services were provided through a video synchronous discussion virtually to substitute for in-person clinic visit. Omar Landon MD, [facility]  Electronically signed.  06/29/20

## 2020-07-21 ENCOUNTER — OFFICE VISIT (OUTPATIENT)
Dept: PRIMARY CARE CLINIC | Age: 60
End: 2020-07-21

## 2020-07-21 VITALS
OXYGEN SATURATION: 97 % | HEART RATE: 44 BPM | BODY MASS INDEX: 35.62 KG/M2 | RESPIRATION RATE: 16 BRPM | WEIGHT: 263 LBS | DIASTOLIC BLOOD PRESSURE: 78 MMHG | TEMPERATURE: 98.5 F | SYSTOLIC BLOOD PRESSURE: 126 MMHG | HEIGHT: 72 IN

## 2020-07-21 DIAGNOSIS — E11.9 TYPE 2 DIABETES MELLITUS WITHOUT COMPLICATION, WITHOUT LONG-TERM CURRENT USE OF INSULIN (HCC): ICD-10-CM

## 2020-07-21 DIAGNOSIS — E78.5 HYPERLIPIDEMIA LDL GOAL <70: ICD-10-CM

## 2020-07-21 DIAGNOSIS — Z00.00 WELL ADULT ON ROUTINE HEALTH CHECK: Primary | ICD-10-CM

## 2020-07-21 DIAGNOSIS — I10 ESSENTIAL HYPERTENSION: ICD-10-CM

## 2020-07-21 DIAGNOSIS — E66.01 OBESITY, MORBID (HCC): ICD-10-CM

## 2020-07-21 RX ORDER — METFORMIN HYDROCHLORIDE 500 MG/1
TABLET, EXTENDED RELEASE ORAL
Qty: 90 TAB | Refills: 0 | Status: SHIPPED | OUTPATIENT
Start: 2020-07-21 | End: 2020-08-07 | Stop reason: SDUPTHER

## 2020-07-21 RX ORDER — LOSARTAN POTASSIUM 25 MG/1
TABLET ORAL
Qty: 90 TAB | Refills: 0 | Status: SHIPPED | OUTPATIENT
Start: 2020-07-21 | End: 2020-08-07 | Stop reason: SDUPTHER

## 2020-07-21 NOTE — PROGRESS NOTES
Subjective:   Adilene Mantilla is a 62 y.o. male presenting for his annual checkup. He  is a 62y.o. year old male who presents for  follow up for Diabetes, HLD and HTN follow up as well.     DM-2: He is on Metformin 500 mg XL one tab/day . Last  A1c of 5.7. He has been compliant with med . No hypoglycemic events.      HTN: He is on Losartan 25 mg.   Reports that at home BP has been ranging great. .      HLD: has been taking Lipitor as prescribed . Has been following weight watcher diet and trying to be more active.      Obesity: He has started following  Weight watchers since February. Lost more than 40 pounds.      Denies any chest pain, soa, cough, abdominal pain.       ROS:  Feeling well. No dyspnea or chest pain on exertion. No abdominal pain, change in bowel habits, black or bloody stools. No urinary tract or prostatic symptoms. No neurological complaints. Specific concerns today: refer to HPI. Patient Active Problem List   Diagnosis Code    Sleep apnea in adult G47.30    Obesity, morbid ([facility] [address] E66.01    Type 2 diabetes mellitus without complication, without long-term current use of insulin ([facility]) E11.9    Essential hypertension I10     Current Outpatient Medications   Medication Sig Dispense Refill    losartan (COZAAR) 25 mg tablet Take 1 tablet by mouth once daily 90 Tab 0    metFORMIN ER (GLUCOPHAGE XR) 500 mg tablet TAKE 1 TABLET BY MOUTH ONCE DAILY WITH DINNER 90 Tab 0    atorvastatin (LIPITOR) 10 mg tablet Take 1 Tab by mouth daily. 90 Tab 1    glucose blood VI test strips (FREESTYLE LITE STRIPS) strip Check fasting blood sugar in AM X 1. 50 Strip 11    OTHER Indications: Black Cherry Extract      Blood-Glucose Meter (FREESTYLE LITE METER) monitoring kit Check fasting blood sugar in AM X 1. 1 Kit 0    lancets misc Check fasting blood sugar in AM X 1. 50 Each 11    MELATONIN PO Take  by mouth nightly as needed.  aspirin delayed-release 81 mg tablet Take  by mouth daily.       multivitamin (ONE A DAY) tablet Take 1 Tab by mouth daily. Allergies   Allergen Reactions    Lisinopril Cough     Past Medical History:   Diagnosis Date    Hypertension      Past Surgical History:   Procedure Laterality Date    HX APPENDECTOMY      HX HERNIA REPAIR      HX TONSILLECTOMY          Lab Results   Component Value Date/Time    Hemoglobin A1c 6.6 (H) 10/03/2018 08:14 AM    Hemoglobin A1c 6.2 (H) 04/18/2018 09:27 AM    Hemoglobin A1c 6.1 (H) 03/02/2018 08:46 AM    Glucose 123 (H) 01/21/2020 08:54 AM    Microalb/Creat ratio (ug/mg creat.) <6.1 04/18/2019 02:32 PM    LDL, calculated 63 01/21/2020 08:54 AM    Creatinine 0.86 01/21/2020 08:54 AM      Lab Results   Component Value Date/Time    Cholesterol, total 140 01/21/2020 08:54 AM    HDL Cholesterol 40 01/21/2020 08:54 AM    LDL, calculated 63 01/21/2020 08:54 AM    Triglyceride 184 (H) 01/21/2020 08:54 AM     Lab Results   Component Value Date/Time    ALT (SGPT) 40 01/21/2020 08:54 AM    Alk.  phosphatase 70 01/21/2020 08:54 AM    Bilirubin, direct 0.15 03/02/2018 08:46 AM    Bilirubin, total 0.6 01/21/2020 08:54 AM    Albumin 4.4 01/21/2020 08:54 AM    Protein, total 6.7 01/21/2020 08:54 AM    PLATELET 352 59/25/5800 08:14 AM     Lab Results   Component Value Date/Time    GFR est non-AA 95 01/21/2020 08:54 AM    GFR est  01/21/2020 08:54 AM    Creatinine 0.86 01/21/2020 08:54 AM    BUN 14 01/21/2020 08:54 AM    Sodium 139 01/21/2020 08:54 AM    Potassium 5.0 01/21/2020 08:54 AM    Chloride 102 01/21/2020 08:54 AM    CO2 24 01/21/2020 08:54 AM     Lab Results   Component Value Date/Time    Hemoglobin A1c 6.6 (H) 10/03/2018 08:14 AM    Hemoglobin A1c (POC) 5.7 01/21/2020 08:31 AM         Objective:     Visit Vitals  /78 (BP 1 Location: Right arm, BP Patient Position: Sitting)   Pulse (!) 44   Temp 98.5 °F (36.9 °C) (Temporal)   Resp 16   Ht 6' (1.829 m)   Wt 263 lb (119.3 kg)   SpO2 97%   BMI 35.67 kg/m²     The patient appears well, alert, oriented x 3, in no distress. ENT normal.  Neck supple. No adenopathy or thyromegaly. LIOR. Lungs are clear, good air entry, no wheezes, rhonchi or rales. S1 and S2 normal, no murmurs, regular rate and rhythm. Abdomen is soft without tenderness, guarding, mass or organomegaly.  exam: defer. Extremities show no edema, normal peripheral pulses. Neurological is normal without focal findings. peripheral pulses normal, no pedal edema, no clubbing or cyanosis, feet normal, good pulses, normal color, temperature and sensation, monofilament sensory exam is normal in both feet. Slight decrease sensation in left great toe but otherwise normal.           Assessment/Plan:   healthy adult male  lose weight, increase physical activity, follow low fat diet, follow low salt diet, continue present plan, routine labs ordered, call if any problems. ICD-10-CM ICD-9-CM    1. Well adult on routine health check  Z00.00 V70.0 THYROID CASCADE PROFILE      METABOLIC PANEL, COMPREHENSIVE      PSA W/ REFLX FREE PSA      CBC WITH AUTOMATED DIFF   2. Hyperlipidemia LDL goal <70  E78.5 272.4 LIPID PANEL   3. Essential hypertension  I10 401.9    4. Type 2 diabetes mellitus without complication, without long-term current use of insulin (HCC)  E11.9 250.00 HEMOGLOBIN A1C WITH EAG      MICROALBUMIN, UR, RAND W/ MICROALB/CREAT RATIO       DIABETES FOOT EXAM     Diagnoses and all orders for this visit:    1. Well adult on routine health check  -     THYROID CASCADE PROFILE  -     METABOLIC PANEL, COMPREHENSIVE  -     PSA W/ REFLX FREE PSA  -     CBC WITH AUTOMATED DIFF    2. Hyperlipidemia LDL goal <70  -     LIPID PANEL              Continue Lipitor. 3. Essential hypertension  -  Well controled with    losartan (COZAAR) 25 mg tablet; Take 1 tablet by mouth once daily    4.  Type 2 diabetes mellitus without complication, without long-term current use of insulin (HCC)  -     HEMOGLOBIN A1C WITH EAG  -     MICROALBUMIN, UR, RAND W/ MICROALB/CREAT RATIO  -      DIABETES FOOT EXAM  -     metFORMIN ER (GLUCOPHAGE XR) 500 mg tablet; TAKE 1 TABLET BY MOUTH ONCE DAILY WITH DINNER              Will notify result and recommendations. 5. Obesity, morbid ([address]       Has been following Weight watcher diet closely . Already lost over 40 pounds in last 5 months. Recommended to add exercise. Follow-up and Dispositions    · Return in about 6 months (around 1/21/2021), or if symptoms worsen or fail to improve, for Bring diabetic log book. , Cholesterol, blood pressure. .       current treatment plan is effective, no change in therapy  reviewed diet, exercise and weight control  reviewed medications and side effects in detail  specific diabetic recommendations: low cholesterol diet, weight control and daily exercise discussed, all medications, side effects and compliance discussed carefully, foot care discussed and Podiatry visits discussed, annual eye examinations at Ophthalmology discussed and long term diabetic complications discussed.

## 2020-07-21 NOTE — PROGRESS NOTES
Chief Complaint   Patient presents with    Complete Physical     Patient presents for his annual physical and labs

## 2020-07-22 LAB
ALBUMIN SERPL-MCNC: 4.3 G/DL (ref 3.8–4.9)
ALBUMIN/CREAT UR: 9 MG/G CREAT (ref 0–29)
ALBUMIN/GLOB SERPL: 1.9 {RATIO} (ref 1.2–2.2)
ALP SERPL-CCNC: 70 IU/L (ref 39–117)
ALT SERPL-CCNC: 39 IU/L (ref 0–44)
AST SERPL-CCNC: 44 IU/L (ref 0–40)
BASOPHILS # BLD AUTO: 0.1 X10E3/UL (ref 0–0.2)
BASOPHILS NFR BLD AUTO: 1 %
BILIRUB SERPL-MCNC: 0.9 MG/DL (ref 0–1.2)
BUN SERPL-MCNC: 12 MG/DL (ref 6–24)
BUN/CREAT SERPL: 14 (ref 9–20)
CALCIUM SERPL-MCNC: 9.6 MG/DL (ref 8.7–10.2)
CHLORIDE SERPL-SCNC: 102 MMOL/L (ref 96–106)
CHOLEST SERPL-MCNC: 119 MG/DL (ref 100–199)
CO2 SERPL-SCNC: 22 MMOL/L (ref 20–29)
CREAT SERPL-MCNC: 0.87 MG/DL (ref 0.76–1.27)
CREAT UR-MCNC: 38.9 MG/DL
EOSINOPHIL # BLD AUTO: 0.1 X10E3/UL (ref 0–0.4)
EOSINOPHIL NFR BLD AUTO: 2 %
ERYTHROCYTE [DISTWIDTH] IN BLOOD BY AUTOMATED COUNT: 12.1 % (ref 11.6–15.4)
EST. AVERAGE GLUCOSE BLD GHB EST-MCNC: 126 MG/DL
GLOBULIN SER CALC-MCNC: 2.3 G/DL (ref 1.5–4.5)
GLUCOSE SERPL-MCNC: 114 MG/DL (ref 65–99)
HBA1C MFR BLD: 6 % (ref 4.8–5.6)
HCT VFR BLD AUTO: 47.4 % (ref 37.5–51)
HDLC SERPL-MCNC: 44 MG/DL
HGB BLD-MCNC: 16 G/DL (ref 13–17.7)
IMM GRANULOCYTES # BLD AUTO: 0 X10E3/UL (ref 0–0.1)
IMM GRANULOCYTES NFR BLD AUTO: 0 %
LDLC SERPL CALC-MCNC: 57 MG/DL (ref 0–99)
LYMPHOCYTES # BLD AUTO: 1.9 X10E3/UL (ref 0.7–3.1)
LYMPHOCYTES NFR BLD AUTO: 29 %
MCH RBC QN AUTO: 31.1 PG (ref 26.6–33)
MCHC RBC AUTO-ENTMCNC: 33.8 G/DL (ref 31.5–35.7)
MCV RBC AUTO: 92 FL (ref 79–97)
MICROALBUMIN UR-MCNC: 3.6 UG/ML
MONOCYTES # BLD AUTO: 0.4 X10E3/UL (ref 0.1–0.9)
MONOCYTES NFR BLD AUTO: 6 %
NEUTROPHILS # BLD AUTO: 4.1 X10E3/UL (ref 1.4–7)
NEUTROPHILS NFR BLD AUTO: 62 %
PLATELET # BLD AUTO: 191 X10E3/UL (ref 150–450)
POTASSIUM SERPL-SCNC: 4.5 MMOL/L (ref 3.5–5.2)
PROT SERPL-MCNC: 6.6 G/DL (ref 6–8.5)
PSA SERPL-MCNC: 1.6 NG/ML (ref 0–4)
RBC # BLD AUTO: 5.15 X10E6/UL (ref 4.14–5.8)
REFLEX CRITERIA: NORMAL
SODIUM SERPL-SCNC: 140 MMOL/L (ref 134–144)
TRIGL SERPL-MCNC: 88 MG/DL (ref 0–149)
TSH SERPL DL<=0.005 MIU/L-ACNC: 1.14 UIU/ML (ref 0.45–4.5)
VLDLC SERPL CALC-MCNC: 18 MG/DL (ref 5–40)
WBC # BLD AUTO: 6.7 X10E3/UL (ref 3.4–10.8)

## 2020-07-23 NOTE — PROGRESS NOTES
Please call patient and mail letter:     1. Diabetes number is lot better. Improved from 6.6 to 6.0. Great! Keep up the good work and continue current med. 2. One of the liver enzyme is very slightly elevated but not concerning. 3. PSA, CBC, kidney, thyroid level is in normal range. 4. Cholesterol level is very well controled. Continue current med.

## 2020-08-07 DIAGNOSIS — I10 ESSENTIAL HYPERTENSION: ICD-10-CM

## 2020-08-07 DIAGNOSIS — E78.5 HYPERLIPIDEMIA LDL GOAL <70: ICD-10-CM

## 2020-08-07 DIAGNOSIS — E11.9 TYPE 2 DIABETES MELLITUS WITHOUT COMPLICATION, WITHOUT LONG-TERM CURRENT USE OF INSULIN (HCC): ICD-10-CM

## 2020-08-07 RX ORDER — METFORMIN HYDROCHLORIDE 500 MG/1
TABLET, EXTENDED RELEASE ORAL
Qty: 90 TAB | Refills: 1 | Status: SHIPPED | OUTPATIENT
Start: 2020-08-07 | End: 2020-12-14 | Stop reason: SDUPTHER

## 2020-08-07 RX ORDER — LOSARTAN POTASSIUM 25 MG/1
TABLET ORAL
Qty: 90 TAB | Refills: 1 | Status: ON HOLD | OUTPATIENT
Start: 2020-08-07 | End: 2020-11-20 | Stop reason: SDUPTHER

## 2020-08-07 RX ORDER — ATORVASTATIN CALCIUM 10 MG/1
10 TABLET, FILM COATED ORAL DAILY
Qty: 90 TAB | Refills: 1 | Status: SHIPPED | OUTPATIENT
Start: 2020-08-07 | End: 2020-12-14 | Stop reason: SDUPTHER

## 2020-08-13 ENCOUNTER — TELEPHONE (OUTPATIENT)
Dept: SLEEP MEDICINE | Age: 60
End: 2020-08-13

## 2020-08-25 ENCOUNTER — HOSPITAL ENCOUNTER (OUTPATIENT)
Dept: SLEEP MEDICINE | Age: 60
Discharge: HOME | End: 2020-08-25
Payer: COMMERCIAL

## 2020-08-25 ENCOUNTER — OFFICE VISIT (OUTPATIENT)
Dept: SLEEP MEDICINE | Age: 60
End: 2020-08-25

## 2020-08-25 DIAGNOSIS — G47.33 OSA (OBSTRUCTIVE SLEEP APNEA): Primary | ICD-10-CM

## 2020-08-25 PROCEDURE — 95806 SLEEP STUDY UNATT&RESP EFFT: CPT | Performed by: INTERNAL MEDICINE

## 2020-08-25 NOTE — PROGRESS NOTES
[address] [facility]. [place], [address]  Tel.  [phone]  Fax. [address] [facility] [address]  [place], [address] [facility]  Tel.  [phone]  Fax. [phone] [address] [facility] [address]   Tel.  [phone]  Fax. [phone]       S>Carlyle Torres is a 62 y.o. male seen today to receive a home sleep testing unit (HST). · Patient was educated on proper hookup and operation of the HST. · Instruction forms and documentation were reviewed and signed. · The patient demonstrated good understanding of the HST.    O>    There were no vitals taken for this visit. A>  1. ANIL (obstructive sleep apnea)          P>  · General information regarding operations and maintenance of the device was provided. · He was provided information on sleep apnea including coresponding risk factors and the importance of proper treatment. · Follow-up appointment was made to return the HST. He will be contacted once the results have been reviewed. · He was asked to contact our office for any problems regarding his home sleep test study.

## 2020-08-26 ENCOUNTER — DOCUMENTATION ONLY (OUTPATIENT)
Dept: SLEEP MEDICINE | Age: 60
End: 2020-08-26

## 2020-08-26 NOTE — PROGRESS NOTES
Patient returned HSAT today. He mentioned waking up several times during the night choking and prefers not to repeat HSAT. He hopes that there is enough data to determine new baseline given 50 lb weight loss. Patient needs new chin strap and will be referred to [address] as his new DME. Request for PAP supplies forwarded to Dr. Carly Chung.

## 2020-08-27 ENCOUNTER — TELEPHONE (OUTPATIENT)
Dept: SLEEP MEDICINE | Age: 60
End: 2020-08-27

## 2020-08-27 DIAGNOSIS — G47.33 OSA (OBSTRUCTIVE SLEEP APNEA): Primary | ICD-10-CM

## 2020-08-27 NOTE — TELEPHONE ENCOUNTER
Results of Sleep Testing reviewed with patient who agrees to a trial of APAP therapy. PAP prescription and follow-up discussed with patient. Patient encouraged to call if there were any further questions regarding sleep symptoms. Encounter Diagnosis   Name Primary?  ANIL (obstructive sleep apnea) Yes       Orders Placed This Encounter    AMB SUPPLY ORDER     Diagnosis: (G47.33) ANIL (obstructive sleep apnea)  (primary encounter diagnosis)     Respironics DreamStation ( Unit - Auto Mode) / Heated Humidifier :    Positive Airway Pressure Therapy: Duration of need: 99 months. Auto - PAP: 10 - 20 cmH2O; Optistart enabled. Ramp Time: 30 Minutes; Flex: 2. Remote monitoring enrollment.  Nasal Pillows Combo Mask (Replace) 2 per month.  Nasal Pillows (Replace) 2 per month.  Full Face Mask 1 every 3 months.  Full Face Mask Cushion 1 per month.  Nasal Cushion (Replace) 2 per month.  Nasal Interface Mask 1 every 3 months.  Headgear 1 every 6 months.  Chinstrap 1 every 6 months.  Tubing 1 every 3 months.  Tubing with heating element 1 every 3 months.  Filter(s) Disposable 2 per month.  Filter(s) Non-Disposable 1 every 6 months. .   [address] [facility] for Humidifier (Replace) 1 every 6 months. Lizzie Chen MD, FAA; NPI: [id number]    Electronically signed.  Date:- 08/27/20

## 2020-08-28 ENCOUNTER — DOCUMENTATION ONLY (OUTPATIENT)
Dept: SLEEP MEDICINE | Age: 60
End: 2020-08-28

## 2020-08-28 NOTE — TELEPHONE ENCOUNTER
Spoke with patient who indicated that current pressure has become intolerable since his weight loss. Explained to patient that the APAP device prescribed would help and a trial of Bi-Level therapy would be an alternative. He agreed to a trial of APAP Therapy for now and may switch to a Bi-level device at a later date if needed.

## 2020-11-16 ENCOUNTER — DOCUMENTATION ONLY (OUTPATIENT)
Dept: SLEEP MEDICINE | Age: 60
End: 2020-11-16

## 2020-11-18 ENCOUNTER — OFFICE VISIT (OUTPATIENT)
Dept: PRIMARY CARE CLINIC | Age: 60
End: 2020-11-18
Payer: COMMERCIAL

## 2020-11-18 ENCOUNTER — HOSPITAL ENCOUNTER (OUTPATIENT)
Age: 60
Setting detail: OBSERVATION
Discharge: HOME | End: 2020-11-20
Attending: EMERGENCY MEDICINE | Admitting: FAMILY MEDICINE
Payer: COMMERCIAL

## 2020-11-18 ENCOUNTER — APPOINTMENT (OUTPATIENT)
Dept: GENERAL RADIOLOGY | Age: 60
End: 2020-11-18
Attending: EMERGENCY MEDICINE
Payer: COMMERCIAL

## 2020-11-18 VITALS
SYSTOLIC BLOOD PRESSURE: 131 MMHG | BODY MASS INDEX: 35.62 KG/M2 | RESPIRATION RATE: 16 BRPM | DIASTOLIC BLOOD PRESSURE: 76 MMHG | WEIGHT: 263 LBS | HEIGHT: 72 IN | OXYGEN SATURATION: 97 % | HEART RATE: 41 BPM | TEMPERATURE: 97.9 F

## 2020-11-18 DIAGNOSIS — Z95.0 PACEMAKER: ICD-10-CM

## 2020-11-18 DIAGNOSIS — G47.30 SLEEP APNEA IN ADULT: ICD-10-CM

## 2020-11-18 DIAGNOSIS — R00.1 JUNCTIONAL BRADYCARDIA: ICD-10-CM

## 2020-11-18 DIAGNOSIS — I49.5 SSS (SICK SINUS SYNDROME) (HCC): ICD-10-CM

## 2020-11-18 DIAGNOSIS — R00.1 BRADYCARDIA: Primary | ICD-10-CM

## 2020-11-18 DIAGNOSIS — H81.11 BENIGN PAROXYSMAL POSITIONAL VERTIGO OF RIGHT EAR: ICD-10-CM

## 2020-11-18 DIAGNOSIS — R42 VERTIGO: ICD-10-CM

## 2020-11-18 DIAGNOSIS — E11.9 TYPE 2 DIABETES MELLITUS WITHOUT COMPLICATION, WITHOUT LONG-TERM CURRENT USE OF INSULIN (HCC): ICD-10-CM

## 2020-11-18 DIAGNOSIS — R94.31 ABNORMAL EKG: ICD-10-CM

## 2020-11-18 DIAGNOSIS — R00.1 JUNCTIONAL (NODAL) BRADYCARDIA: ICD-10-CM

## 2020-11-18 DIAGNOSIS — E66.01 OBESITY, MORBID (HCC): ICD-10-CM

## 2020-11-18 DIAGNOSIS — I10 ESSENTIAL HYPERTENSION: ICD-10-CM

## 2020-11-18 LAB
ALBUMIN SERPL-MCNC: 3.9 G/DL (ref 3.5–5)
ALBUMIN/GLOB SERPL: 1.1 {RATIO} (ref 1.1–2.2)
ALP SERPL-CCNC: 67 U/L (ref 45–117)
ALT SERPL-CCNC: 46 U/L (ref 12–78)
ANION GAP SERPL CALC-SCNC: 8 MMOL/L (ref 5–15)
AST SERPL-CCNC: 49 U/L (ref 15–37)
BASOPHILS # BLD: 0.1 K/UL (ref 0–0.1)
BASOPHILS NFR BLD: 1 % (ref 0–1)
BILIRUB SERPL-MCNC: 0.8 MG/DL (ref 0.2–1)
BUN SERPL-MCNC: 15 MG/DL (ref 6–20)
BUN/CREAT SERPL: 19 (ref 12–20)
CALCIUM SERPL-MCNC: 8.9 MG/DL (ref 8.5–10.1)
CHLORIDE SERPL-SCNC: 105 MMOL/L (ref 97–108)
CO2 SERPL-SCNC: 25 MMOL/L (ref 21–32)
CREAT SERPL-MCNC: 0.77 MG/DL (ref 0.7–1.3)
DIFFERENTIAL METHOD BLD: NORMAL
EOSINOPHIL # BLD: 0.1 K/UL (ref 0–0.4)
EOSINOPHIL NFR BLD: 2 % (ref 0–7)
ERYTHROCYTE [DISTWIDTH] IN BLOOD BY AUTOMATED COUNT: 12.4 % (ref 11.5–14.5)
GLOBULIN SER CALC-MCNC: 3.5 G/DL (ref 2–4)
GLUCOSE SERPL-MCNC: 96 MG/DL (ref 65–100)
HCT VFR BLD AUTO: 45.8 % (ref 36.6–50.3)
HGB BLD-MCNC: 15.5 G/DL (ref 12.1–17)
IMM GRANULOCYTES # BLD AUTO: 0 K/UL (ref 0–0.04)
IMM GRANULOCYTES NFR BLD AUTO: 0 % (ref 0–0.5)
LYMPHOCYTES # BLD: 2.3 K/UL (ref 0.8–3.5)
LYMPHOCYTES NFR BLD: 34 % (ref 12–49)
MAGNESIUM SERPL-MCNC: 2.3 MG/DL (ref 1.6–2.4)
MCH RBC QN AUTO: 31.1 PG (ref 26–34)
MCHC RBC AUTO-ENTMCNC: 33.8 G/DL (ref 30–36.5)
MCV RBC AUTO: 91.8 FL (ref 80–99)
MONOCYTES # BLD: 0.5 K/UL (ref 0–1)
MONOCYTES NFR BLD: 7 % (ref 5–13)
NEUTS SEG # BLD: 3.9 K/UL (ref 1.8–8)
NEUTS SEG NFR BLD: 56 % (ref 32–75)
NRBC # BLD: 0 K/UL (ref 0–0.01)
NRBC BLD-RTO: 0 PER 100 WBC
PLATELET # BLD AUTO: 190 K/UL (ref 150–400)
PMV BLD AUTO: 11.9 FL (ref 8.9–12.9)
POTASSIUM SERPL-SCNC: 5 MMOL/L (ref 3.5–5.1)
PROT SERPL-MCNC: 7.4 G/DL (ref 6.4–8.2)
RBC # BLD AUTO: 4.99 M/UL (ref 4.1–5.7)
SODIUM SERPL-SCNC: 138 MMOL/L (ref 136–145)
TROPONIN I SERPL-MCNC: <0.05 NG/ML
TSH SERPL DL<=0.05 MIU/L-ACNC: 2.06 UIU/ML (ref 0.36–3.74)
WBC # BLD AUTO: 6.8 K/UL (ref 4.1–11.1)

## 2020-11-18 PROCEDURE — 93000 ELECTROCARDIOGRAM COMPLETE: CPT | Performed by: FAMILY MEDICINE

## 2020-11-18 PROCEDURE — 93005 ELECTROCARDIOGRAM TRACING: CPT

## 2020-11-18 PROCEDURE — 99218 HC RM OBSERVATION: CPT

## 2020-11-18 PROCEDURE — 71045 X-RAY EXAM CHEST 1 VIEW: CPT

## 2020-11-18 PROCEDURE — 99244 OFF/OP CNSLTJ NEW/EST MOD 40: CPT | Performed by: INTERNAL MEDICINE

## 2020-11-18 PROCEDURE — 85025 COMPLETE CBC W/AUTO DIFF WBC: CPT

## 2020-11-18 PROCEDURE — 74011250636 HC RX REV CODE- 250/636: Performed by: EMERGENCY MEDICINE

## 2020-11-18 PROCEDURE — 80053 COMPREHEN METABOLIC PANEL: CPT

## 2020-11-18 PROCEDURE — 74011250637 HC RX REV CODE- 250/637: Performed by: FAMILY MEDICINE

## 2020-11-18 PROCEDURE — 99285 EMERGENCY DEPT VISIT HI MDM: CPT

## 2020-11-18 PROCEDURE — 99214 OFFICE O/P EST MOD 30 MIN: CPT | Performed by: FAMILY MEDICINE

## 2020-11-18 PROCEDURE — 83735 ASSAY OF MAGNESIUM: CPT

## 2020-11-18 PROCEDURE — 84443 ASSAY THYROID STIM HORMONE: CPT

## 2020-11-18 PROCEDURE — 36415 COLL VENOUS BLD VENIPUNCTURE: CPT

## 2020-11-18 PROCEDURE — 84484 ASSAY OF TROPONIN QUANT: CPT

## 2020-11-18 RX ORDER — ATORVASTATIN CALCIUM 10 MG/1
10 TABLET, FILM COATED ORAL DAILY
Status: DISCONTINUED | OUTPATIENT
Start: 2020-11-19 | End: 2020-11-18

## 2020-11-18 RX ORDER — SODIUM CHLORIDE 0.9 % (FLUSH) 0.9 %
5-40 SYRINGE (ML) INJECTION AS NEEDED
Status: DISCONTINUED | OUTPATIENT
Start: 2020-11-18 | End: 2020-11-20 | Stop reason: HOSPADM

## 2020-11-18 RX ORDER — ACETAMINOPHEN 325 MG/1
650 TABLET ORAL
Status: DISCONTINUED | OUTPATIENT
Start: 2020-11-18 | End: 2020-11-20 | Stop reason: HOSPADM

## 2020-11-18 RX ORDER — MECLIZINE HCL 12.5 MG 12.5 MG/1
25 TABLET ORAL
Status: COMPLETED | OUTPATIENT
Start: 2020-11-18 | End: 2020-11-18

## 2020-11-18 RX ORDER — ACETAMINOPHEN 650 MG/1
650 SUPPOSITORY RECTAL
Status: DISCONTINUED | OUTPATIENT
Start: 2020-11-18 | End: 2020-11-20 | Stop reason: HOSPADM

## 2020-11-18 RX ORDER — SODIUM CHLORIDE 0.9 % (FLUSH) 0.9 %
5-40 SYRINGE (ML) INJECTION EVERY 8 HOURS
Status: DISCONTINUED | OUTPATIENT
Start: 2020-11-18 | End: 2020-11-20 | Stop reason: HOSPADM

## 2020-11-18 RX ORDER — LOSARTAN POTASSIUM 25 MG/1
25 TABLET ORAL
Status: DISCONTINUED | OUTPATIENT
Start: 2020-11-18 | End: 2020-11-20 | Stop reason: HOSPADM

## 2020-11-18 RX ORDER — ATORVASTATIN CALCIUM 10 MG/1
10 TABLET, FILM COATED ORAL
Status: DISCONTINUED | OUTPATIENT
Start: 2020-11-18 | End: 2020-11-20 | Stop reason: HOSPADM

## 2020-11-18 RX ADMIN — ATORVASTATIN CALCIUM 10 MG: 10 TABLET, FILM COATED ORAL at 22:51

## 2020-11-18 RX ADMIN — MECLIZINE 25 MG: 12.5 TABLET ORAL at 17:34

## 2020-11-18 RX ADMIN — Medication 10 ML: at 22:52

## 2020-11-18 NOTE — ED NOTES
Sitting up in bed, awake and alert, states wife is on his way. Aware we are waiting to speak with cardio and of admission process if he is to get admitted.

## 2020-11-18 NOTE — PROGRESS NOTES
HPI     Chief Complaint   Patient presents with    Dizziness     He reports last Friday night he started having dizziness last Friday night. Reports feeling dizzy when laying down or getting up from a sitting to standing position. He also reports unsteadiness with walking during these eposides. Reports sleeping with CPAP and no dizziness at night while on machine. Notes room starts spinning and bars of light appears with dizzy episodes while laying down. Also states when he gets up to a sitting position, he feels vibrations.  Blood Pressure Check     Patient reports having elevated BP readings a few times when checked at home. He is a 62 y.o. male who presents for dizziness. Started last Friday. Occurring on and off. States he gets dizzy when he goes from laying down to standing. States his BP is not running low and typically is high during these episodes. States he sleeps with CPAP and doesn't roll in his sleep. Review of Systems   Constitutional: Negative for chills and fever. Respiratory: Negative for shortness of breath. Cardiovascular: Negative for chest pain and palpitations. Neurological: Positive for dizziness and light-headedness. Reviewed PmHx, RxHx, FmHx, SocHx, AllgHx and updated and dated in the chart. Physical Exam:  Visit Vitals  /76 (BP 1 Location: Left arm, BP Patient Position: Sitting)   Pulse (!) 41   Temp 97.9 °F (36.6 °C) (Oral)   Resp 16   Ht 6' (1.829 m)   Wt 263 lb (119.3 kg)   SpO2 97%   BMI 35.67 kg/m²     Physical Exam  Vitals signs and nursing note reviewed. Constitutional:       General: He is not in acute distress. Appearance: Normal appearance. He is not ill-appearing. Cardiovascular:      Rate and Rhythm: Regular rhythm. Bradycardia present. Pulses: Normal pulses. Pulmonary:      Effort: Pulmonary effort is normal. No respiratory distress. Breath sounds: Normal breath sounds. No wheezing or rales.    Skin:     General: Skin is warm and dry. Neurological:      Mental Status: He is alert. Gait: Gait normal.      Comments: [place] Hallpike positive for nystagmus on R. Not able to tolerate orthostatics due to dizziness and nearly falling off table. Gait normal. Speech is normal.       EKG - rate 35, some leads without p waves, RBBB    No results found for this or any previous visit (from the past 12 hour(s)). Assessment / Plan     Diagnoses and all orders for this visit:    1. Bradycardia - reached out to Dr. Jarred Jauregui (Cards) regarding his EKG who recommend eval in ER today. Patient was walked down to the ER without difficulty. Spoke with ER to let them know why patient was sent. -     AMB POC EKG ROUTINE W/ 12 LEADS, INTER & REP  -     REFERRAL TO CARDIOLOGY    2. Benign paroxysmal positional vertigo of right ear - believe dizziness is likely 2/2 BPPV but can evaluate further in ER. Given referral to PT.  -     REFERRAL TO PHYSICAL THERAPY  -     POSITIONAL NYSTAGMUS TEST     Patient was sent to ER. I have discussed the diagnosis with the patient and the intended plan as seen in the above orders. The patient has received an after-visit summary and questions were answered concerning future plans. I have discussed medication side effects and warnings with the patient as well.     Macario Timmons, DO  Family Medicine Resident

## 2020-11-18 NOTE — ED TRIAGE NOTES
Has had dizzy spells since Friday. Went to see PCP today and heart rate was in the 30s and referred to ER. States if he looks to the right dizziness gets worse. Ambulatory with steady gait from waiting room to pt room.

## 2020-11-18 NOTE — ED PROVIDER NOTES
67-year-old gentleman presents from his primary care's office today for bradycardia. He was being seen there this afternoon for dizziness which has been going on since Friday. His dizziness is described as room spinning and is worse when he looks up and to the right. He reportedly had a positive [place]-Hallpike to the right at his primary care's office today. He was sent to the emergency department for evaluation of bradycardia in the 30s. He has no history of cardiac disease. He denies any chest pain or pressure shortness of breath. He has not been recently ill. The medical record indicates that the EKG from primary care was sent to Dr. Acacia Joshua of cardiology who recommended that the patient come to the emergency department as well. The history is provided by the patient. Slow Heart Rate    This is a new problem. Episode onset: Today. The problem has not changed since onset. The problem occurs constantly. The patient is experiencing no pain. Associated symptoms include dizziness and weakness. Pertinent negatives include no abdominal pain, no back pain, no cough, no diaphoresis, no exertional chest pressure, no fever, no headaches, no irregular heartbeat, no malaise/fatigue, no nausea, no near-syncope, no palpitations, no shortness of breath and no vomiting.         Past Medical History:   Diagnosis Date    Hypertension        Past Surgical History:   Procedure Laterality Date    HX APPENDECTOMY      HX CERVICAL FUSION      HX HERNIA REPAIR      HX ORTHOPAEDIC      HX TONSILLECTOMY      HX TONSILLECTOMY           Family History:   Problem Relation Age of Onset    Lung Disease Mother     Diabetes Mother     Cancer Mother     Diabetes Father     Heart Disease Father     Lung Disease Father        Social History     Socioeconomic History    Marital status:      Spouse name: Not on file    Number of children: Not on file    Years of education: Not on file    Highest education level: Not on file   Occupational History    Not on file   Social Needs    Financial resource strain: Not on file    Food insecurity     Worry: Not on file     Inability: Not on file    Transportation needs     Medical: Not on file     Non-medical: Not on file   Tobacco Use    Smoking status: Never Smoker    Smokeless tobacco: Never Used    Tobacco comment: occ   Substance and Sexual Activity    Alcohol use: Yes     Alcohol/week: 4.0 standard drinks     Types: 4 Cans of beer per week     Comment: on saturday    Drug use: No    Sexual activity: Yes   Lifestyle    Physical activity     Days per week: Not on file     Minutes per session: Not on file    Stress: Not on file   Relationships    Social connections     Talks on phone: Not on file     Gets together: Not on file     Attends Latter-day service: Not on file     Active member of club or organization: Not on file     Attends meetings of clubs or organizations: Not on file     Relationship status: Not on file    Intimate partner violence     Fear of current or ex partner: Not on file     Emotionally abused: Not on file     Physically abused: Not on file     Forced sexual activity: Not on file   Other Topics Concern     Service Not Asked    Blood Transfusions Not Asked    Caffeine Concern Not Asked    Occupational Exposure Not Asked   Serene Sood Hazards Not Asked    Sleep Concern Not Asked    Stress Concern Not Asked    Weight Concern Not Asked    Special Diet Not Asked    Back Care Not Asked    Exercise Not Asked    Bike Helmet Not Asked   [address] Not Asked    Self-Exams Not Asked   Social History Narrative    Not on file         ALLERGIES: Lisinopril    Review of Systems   Constitutional: Negative for diaphoresis, fatigue, fever and malaise/fatigue. HENT: Negative for sneezing and sore throat. Respiratory: Negative for cough and shortness of breath. Cardiovascular: Negative for chest pain, palpitations, leg swelling and near-syncope. Gastrointestinal: Negative for abdominal pain, diarrhea, nausea and vomiting. Genitourinary: Negative for difficulty urinating and dysuria. Musculoskeletal: Negative for arthralgias, back pain and myalgias. Skin: Negative for color change and rash. Neurological: Positive for dizziness and weakness. Negative for headaches. Psychiatric/Behavioral: Negative for agitation and behavioral problems. Vitals:    11/18/20 1623   BP: 134/87   Pulse: (!) 38   Resp: 16   Temp: 98 °F (36.7 °C)   SpO2: 98%            Physical Exam  Vitals signs and nursing note reviewed. Constitutional:       General: He is not in acute distress. Appearance: Normal appearance. He is normal weight. He is not ill-appearing, toxic-appearing or diaphoretic. HENT:      Head: Normocephalic and atraumatic. Nose: Nose normal.      Mouth/Throat:      Mouth: Mucous membranes are moist.      Pharynx: Oropharynx is clear. Eyes:      Extraocular Movements: Extraocular movements intact. Conjunctiva/sclera: Conjunctivae normal.      Pupils: Pupils are equal, round, and reactive to light. Neck:      Musculoskeletal: Normal range of motion and neck supple. Cardiovascular:      Rate and Rhythm: Regular rhythm. Bradycardia present. Pulses: Normal pulses. Heart sounds: Normal heart sounds. No murmur. Pulmonary:      Effort: Pulmonary effort is normal. No respiratory distress. Breath sounds: Normal breath sounds. Abdominal:      General: There is no distension. Palpations: Abdomen is soft. There is no mass. Tenderness: There is no abdominal tenderness. There is no guarding or rebound. Musculoskeletal: Normal range of motion. General: No swelling, tenderness, deformity or signs of injury. Skin:     General: Skin is warm and dry. Capillary Refill: Capillary refill takes less than 2 seconds. Neurological:      General: No focal deficit present.       Mental Status: He is alert and oriented to person, place, and time. Comments: Patient has fatigable nystagmus. Positive [place]-Hallpike to the right. Psychiatric:         Mood and Affect: Mood normal.         Behavior: Behavior normal.          MDM  Number of Diagnoses or Management Options  Abnormal EKG:   Bradycardia:   Vertigo:   Diagnosis management comments: 69-year-old gentleman presents as above with vertigo and bradycardia. After discussion with the patient with cardiology will admit to the hospital for further work-up and management. His vertigo seems to be peripheral in origin. He does not seem to have symptoms from his bradycardia which has been extreme. Amount and/or Complexity of Data Reviewed  Clinical lab tests: reviewed  Tests in the radiology section of CPT®: reviewed  Tests in the medicine section of CPT®: reviewed           Procedures      Perfect Serve Consult for Admission  5:56 PM    ED Room Number: SER03/03  Patient Name and age:  Red Garrett 62 y.o.  male  Working Diagnosis:   1. Bradycardia    2. Abnormal EKG    3. Vertigo        COVID-19 Suspicion:  no  Sepsis present:  no  Reassessment needed: N/A  Code Status:  Full Code  Readmission: no  Isolation Requirements:  no  Recommended Level of Care:  telemetry  Department:[facility] - [phone]  Other:  Discussed with Dr. Khurram Price of cardiology             Rhythm: Junctional versus idioventricular rhythm at a rate of approximately 32. Axis: normal.  ST segment:  No concerning ST elevations or depressions. This EKG was interpreted by Rui Beasley MD,ED Provider. 1755: Discussed with cardiology, Dr. Khurram Price, who recommends admission to the hospitalist and likely evaluation for permanent pacemaker placement.

## 2020-11-18 NOTE — PROGRESS NOTES
Chief Complaint   Patient presents with    Dizziness     He reports last Friday night he started having dizziness last Friday night. Reports feeling dizzy when laying down or getting up from a sitting to standing position. He also reports unsteadiness with walking during these eposides. Reports sleeping with CPAP and no dizziness at night while on machine. Notes room starts spinning and bars of light appears with dizzy episodes while laying down. Also states when he gets up to a sitting position, he feels vibrations.  Blood Pressure Check     Patient reports having elevated BP readings a few times when checked at home.

## 2020-11-18 NOTE — Clinical Note
Bilateral chest clipped prepped with ChloraPrep and draped. Wet prep solution applied at: 1127. Wet prep solution dried at: 1130. Wet prep elapsed drying time: 3 mins.

## 2020-11-18 NOTE — Clinical Note
TRANSFER - OUT REPORT:     Verbal report given to: Bedside. Report consisted of patient's Situation, Background, Assessment and   Recommendations(SBAR). Opportunity for questions and clarification was provided. Patient transported with a Registered Nurse and Monitor. Patient transported to: 353.

## 2020-11-19 ENCOUNTER — APPOINTMENT (OUTPATIENT)
Dept: GENERAL RADIOLOGY | Age: 60
End: 2020-11-19
Attending: NURSE PRACTITIONER
Payer: COMMERCIAL

## 2020-11-19 ENCOUNTER — APPOINTMENT (OUTPATIENT)
Dept: NON INVASIVE DIAGNOSTICS | Age: 60
End: 2020-11-19
Attending: NURSE PRACTITIONER
Payer: COMMERCIAL

## 2020-11-19 PROBLEM — R00.1 JUNCTIONAL BRADYCARDIA: Status: ACTIVE | Noted: 2020-11-18

## 2020-11-19 PROBLEM — Z95.0 PACEMAKER: Status: ACTIVE | Noted: 2020-11-19

## 2020-11-19 PROBLEM — E78.5 DYSLIPIDEMIA: Status: ACTIVE | Noted: 2020-11-19

## 2020-11-19 LAB
ANION GAP SERPL CALC-SCNC: 3 MMOL/L (ref 5–15)
ATRIAL RATE: 30 BPM
BASOPHILS # BLD: 0 K/UL (ref 0–0.1)
BASOPHILS NFR BLD: 1 % (ref 0–1)
BUN SERPL-MCNC: 14 MG/DL (ref 6–20)
BUN/CREAT SERPL: 16 (ref 12–20)
CALCIUM SERPL-MCNC: 8.7 MG/DL (ref 8.5–10.1)
CALCULATED R AXIS, ECG10: 72 DEGREES
CALCULATED T AXIS, ECG11: 47 DEGREES
CHLORIDE SERPL-SCNC: 110 MMOL/L (ref 97–108)
CO2 SERPL-SCNC: 26 MMOL/L (ref 21–32)
CREAT SERPL-MCNC: 0.85 MG/DL (ref 0.7–1.3)
DIAGNOSIS, 93000: NORMAL
DIFFERENTIAL METHOD BLD: NORMAL
ECHO AO ROOT DIAM: 3.91 CM
ECHO AV AREA PEAK VELOCITY: 4.08 CM2
ECHO AV AREA/BSA PEAK VELOCITY: 1.7 CM2/M2
ECHO AV PEAK GRADIENT: 7.62 MMHG
ECHO AV PEAK VELOCITY: 137.98 CM/S
ECHO EST RA PRESSURE: 10 MMHG
ECHO LA AREA 4C: 19.22 CM2
ECHO LA MAJOR AXIS: 4.54 CM
ECHO LA MINOR AXIS: 1.91 CM
ECHO LA VOL 2C: 73.73 ML (ref 18–58)
ECHO LA VOL 4C: 51.78 ML (ref 18–58)
ECHO LA VOL BP: 66.9 ML (ref 18–58)
ECHO LA VOL/BSA BIPLANE: 28.14 ML/M2 (ref 16–28)
ECHO LA VOLUME INDEX A2C: 31.01 ML/M2 (ref 16–28)
ECHO LA VOLUME INDEX A4C: 21.78 ML/M2 (ref 16–28)
ECHO LV E' LATERAL VELOCITY: 3.53 CM/S
ECHO LV E' SEPTAL VELOCITY: 3.8 CM/S
ECHO LV INTERNAL DIMENSION DIASTOLIC: 5.88 CM (ref 4.2–5.9)
ECHO LV INTERNAL DIMENSION SYSTOLIC: 3.48 CM
ECHO LV IVSD: 1.05 CM (ref 0.6–1)
ECHO LV MASS 2D: 286.8 G (ref 88–224)
ECHO LV MASS INDEX 2D: 120.6 G/M2 (ref 49–115)
ECHO LV POSTERIOR WALL DIASTOLIC: 1.25 CM (ref 0.6–1)
ECHO LVOT DIAM: 2.49 CM
ECHO LVOT PEAK GRADIENT: 5.36 MMHG
ECHO LVOT PEAK VELOCITY: 115.77 CM/S
ECHO MV A VELOCITY: 21.3 CM/S
ECHO MV AREA PHT: 2.85 CM2
ECHO MV E DECELERATION TIME (DT): 266.11 MS
ECHO MV E VELOCITY: 117.38 CM/S
ECHO MV E/A RATIO: 5.51
ECHO MV E/E' LATERAL: 33.25
ECHO MV E/E' RATIO (AVERAGED): 32.07
ECHO MV E/E' SEPTAL: 30.89
ECHO MV PRESSURE HALF TIME (PHT): 77.17 MS
ECHO PV MAX VELOCITY: 75.43 CM/S
ECHO PV PEAK INSTANTANEOUS GRADIENT SYSTOLIC: 2.28 MMHG
ECHO PV REGURGITANT MAX VELOCITY: 51.62 CM/S
ECHO RIGHT VENTRICULAR SYSTOLIC PRESSURE (RVSP): 37.06 MMHG
ECHO RV INTERNAL DIMENSION: 6.43 CM
ECHO RV TAPSE: 3.06 CM (ref 1.5–2)
ECHO TV REGURGITANT MAX VELOCITY: 260.12 CM/S
ECHO TV REGURGITANT PEAK GRADIENT: 27.06 MMHG
EOSINOPHIL # BLD: 0.2 K/UL (ref 0–0.4)
EOSINOPHIL NFR BLD: 4 % (ref 0–7)
ERYTHROCYTE [DISTWIDTH] IN BLOOD BY AUTOMATED COUNT: 12.3 % (ref 11.5–14.5)
GLUCOSE SERPL-MCNC: 124 MG/DL (ref 65–100)
HCT VFR BLD AUTO: 43.1 % (ref 36.6–50.3)
HGB BLD-MCNC: 14.6 G/DL (ref 12.1–17)
IMM GRANULOCYTES # BLD AUTO: 0 K/UL (ref 0–0.04)
IMM GRANULOCYTES NFR BLD AUTO: 0 % (ref 0–0.5)
LYMPHOCYTES # BLD: 2.1 K/UL (ref 0.8–3.5)
LYMPHOCYTES NFR BLD: 37 % (ref 12–49)
MAGNESIUM SERPL-MCNC: 2.2 MG/DL (ref 1.6–2.4)
MCH RBC QN AUTO: 31.5 PG (ref 26–34)
MCHC RBC AUTO-ENTMCNC: 33.9 G/DL (ref 30–36.5)
MCV RBC AUTO: 93.1 FL (ref 80–99)
MONOCYTES # BLD: 0.4 K/UL (ref 0–1)
MONOCYTES NFR BLD: 7 % (ref 5–13)
NEUTS SEG # BLD: 3 K/UL (ref 1.8–8)
NEUTS SEG NFR BLD: 51 % (ref 32–75)
NRBC # BLD: 0 K/UL (ref 0–0.01)
NRBC BLD-RTO: 0 PER 100 WBC
PLATELET # BLD AUTO: 166 K/UL (ref 150–400)
PMV BLD AUTO: 11.9 FL (ref 8.9–12.9)
POTASSIUM SERPL-SCNC: 3.8 MMOL/L (ref 3.5–5.1)
Q-T INTERVAL, ECG07: 526 MS
QRS DURATION, ECG06: 150 MS
QTC CALCULATION (BEZET), ECG08: 383 MS
RBC # BLD AUTO: 4.63 M/UL (ref 4.1–5.7)
SODIUM SERPL-SCNC: 139 MMOL/L (ref 136–145)
VENTRICULAR RATE, ECG03: 32 BPM
WBC # BLD AUTO: 5.9 K/UL (ref 4.1–11.1)

## 2020-11-19 PROCEDURE — 36415 COLL VENOUS BLD VENIPUNCTURE: CPT

## 2020-11-19 PROCEDURE — 99244 OFF/OP CNSLTJ NEW/EST MOD 40: CPT | Performed by: INTERNAL MEDICINE

## 2020-11-19 PROCEDURE — 99218 HC RM OBSERVATION: CPT

## 2020-11-19 PROCEDURE — 74011250637 HC RX REV CODE- 250/637: Performed by: FAMILY MEDICINE

## 2020-11-19 PROCEDURE — 74011000272 HC RX REV CODE- 272: Performed by: INTERNAL MEDICINE

## 2020-11-19 PROCEDURE — 74011250636 HC RX REV CODE- 250/636: Performed by: NURSE PRACTITIONER

## 2020-11-19 PROCEDURE — C1893 INTRO/SHEATH, FIXED,NON-PEEL: HCPCS | Performed by: INTERNAL MEDICINE

## 2020-11-19 PROCEDURE — 99153 MOD SED SAME PHYS/QHP EA: CPT | Performed by: INTERNAL MEDICINE

## 2020-11-19 PROCEDURE — 74011000250 HC RX REV CODE- 250: Performed by: INTERNAL MEDICINE

## 2020-11-19 PROCEDURE — 77030022704 HC SUT VLOC COVD -B: Performed by: INTERNAL MEDICINE

## 2020-11-19 PROCEDURE — C1898 LEAD, PMKR, OTHER THAN TRANS: HCPCS | Performed by: INTERNAL MEDICINE

## 2020-11-19 PROCEDURE — 71045 X-RAY EXAM CHEST 1 VIEW: CPT

## 2020-11-19 PROCEDURE — 96374 THER/PROPH/DIAG INJ IV PUSH: CPT

## 2020-11-19 PROCEDURE — 33208 INSRT HEART PM ATRIAL & VENT: CPT | Performed by: INTERNAL MEDICINE

## 2020-11-19 PROCEDURE — C1781 MESH (IMPLANTABLE): HCPCS | Performed by: INTERNAL MEDICINE

## 2020-11-19 PROCEDURE — 77030018547 HC SUT ETHBND1 J&J -B: Performed by: INTERNAL MEDICINE

## 2020-11-19 PROCEDURE — 74011250637 HC RX REV CODE- 250/637: Performed by: NURSE PRACTITIONER

## 2020-11-19 PROCEDURE — 77030041075 HC DRSG AG OPTIFRM MDII -B: Performed by: INTERNAL MEDICINE

## 2020-11-19 PROCEDURE — 77030032060 HC PWDR HEMSTAT ARISTA ASRB 3GM BARD -C: Performed by: INTERNAL MEDICINE

## 2020-11-19 PROCEDURE — 77030039046 HC PAD DEFIB RADIOTRNSPNT CNMD -B: Performed by: INTERNAL MEDICINE

## 2020-11-19 PROCEDURE — 83735 ASSAY OF MAGNESIUM: CPT

## 2020-11-19 PROCEDURE — 74011250636 HC RX REV CODE- 250/636: Performed by: INTERNAL MEDICINE

## 2020-11-19 PROCEDURE — C1785 PMKR, DUAL, RATE-RESP: HCPCS | Performed by: INTERNAL MEDICINE

## 2020-11-19 PROCEDURE — 93306 TTE W/DOPPLER COMPLETE: CPT

## 2020-11-19 PROCEDURE — 2709999900 HC NON-CHARGEABLE SUPPLY: Performed by: INTERNAL MEDICINE

## 2020-11-19 PROCEDURE — 99024 POSTOP FOLLOW-UP VISIT: CPT | Performed by: INTERNAL MEDICINE

## 2020-11-19 PROCEDURE — 74011000250 HC RX REV CODE- 250: Performed by: NURSE PRACTITIONER

## 2020-11-19 PROCEDURE — 80048 BASIC METABOLIC PNL TOTAL CA: CPT

## 2020-11-19 PROCEDURE — 99152 MOD SED SAME PHYS/QHP 5/>YRS: CPT | Performed by: INTERNAL MEDICINE

## 2020-11-19 PROCEDURE — 85025 COMPLETE CBC W/AUTO DIFF WBC: CPT

## 2020-11-19 DEVICE — LEAD 5076-52 MRI US RCMCRD
Type: IMPLANTABLE DEVICE | Status: FUNCTIONAL
Brand: CAPSUREFIX NOVUS MRI™ SURESCAN®

## 2020-11-19 DEVICE — IPG W1DR01 AZURE XT DR MRI WL USA
Type: IMPLANTABLE DEVICE | Status: FUNCTIONAL
Brand: AZURE™ XT DR MRI SURESCAN™

## 2020-11-19 DEVICE — ENVELOPE CMRM6133 ABSORB LRG MR
Type: IMPLANTABLE DEVICE | Status: FUNCTIONAL
Brand: TYRX™

## 2020-11-19 DEVICE — LEAD 5076-58 MRI US RCMCRD
Type: IMPLANTABLE DEVICE | Status: FUNCTIONAL
Brand: CAPSUREFIX NOVUS MRI™ SURESCAN®

## 2020-11-19 RX ORDER — SODIUM CHLORIDE 0.9 % (FLUSH) 0.9 %
5-40 SYRINGE (ML) INJECTION EVERY 8 HOURS
Status: DISCONTINUED | OUTPATIENT
Start: 2020-11-19 | End: 2020-11-20

## 2020-11-19 RX ORDER — CEFAZOLIN SODIUM/WATER 2 G/20 ML
2 SYRINGE (ML) INTRAVENOUS EVERY 8 HOURS
Status: COMPLETED | OUTPATIENT
Start: 2020-11-19 | End: 2020-11-20

## 2020-11-19 RX ORDER — SODIUM CHLORIDE 0.9 % (FLUSH) 0.9 %
5-40 SYRINGE (ML) INJECTION AS NEEDED
Status: DISCONTINUED | OUTPATIENT
Start: 2020-11-19 | End: 2020-11-20 | Stop reason: SDUPTHER

## 2020-11-19 RX ORDER — MIDAZOLAM HYDROCHLORIDE 1 MG/ML
INJECTION, SOLUTION INTRAMUSCULAR; INTRAVENOUS AS NEEDED
Status: DISCONTINUED | OUTPATIENT
Start: 2020-11-19 | End: 2020-11-19 | Stop reason: HOSPADM

## 2020-11-19 RX ORDER — ONDANSETRON 2 MG/ML
4 INJECTION INTRAMUSCULAR; INTRAVENOUS
Status: DISCONTINUED | OUTPATIENT
Start: 2020-11-19 | End: 2020-11-19 | Stop reason: HOSPADM

## 2020-11-19 RX ORDER — SODIUM CHLORIDE 0.9 % (FLUSH) 0.9 %
5-40 SYRINGE (ML) INJECTION AS NEEDED
Status: DISCONTINUED | OUTPATIENT
Start: 2020-11-19 | End: 2020-11-19 | Stop reason: HOSPADM

## 2020-11-19 RX ORDER — LIDOCAINE HYDROCHLORIDE 10 MG/ML
INJECTION INFILTRATION; PERINEURAL AS NEEDED
Status: DISCONTINUED | OUTPATIENT
Start: 2020-11-19 | End: 2020-11-19 | Stop reason: HOSPADM

## 2020-11-19 RX ORDER — CEFAZOLIN SODIUM/WATER 2 G/20 ML
2 SYRINGE (ML) INTRAVENOUS ONCE
Status: COMPLETED | OUTPATIENT
Start: 2020-11-19 | End: 2020-11-19

## 2020-11-19 RX ORDER — CEPHALEXIN 500 MG/1
500 CAPSULE ORAL 3 TIMES DAILY
Qty: 15 CAP | Refills: 0 | Status: SHIPPED | OUTPATIENT
Start: 2020-11-19 | End: 2020-11-24

## 2020-11-19 RX ORDER — HYDROCODONE BITARTRATE AND ACETAMINOPHEN 5; 325 MG/1; MG/1
1 TABLET ORAL
Status: DISCONTINUED | OUTPATIENT
Start: 2020-11-19 | End: 2020-11-20 | Stop reason: HOSPADM

## 2020-11-19 RX ORDER — FENTANYL CITRATE 50 UG/ML
INJECTION, SOLUTION INTRAMUSCULAR; INTRAVENOUS AS NEEDED
Status: DISCONTINUED | OUTPATIENT
Start: 2020-11-19 | End: 2020-11-19 | Stop reason: HOSPADM

## 2020-11-19 RX ORDER — SODIUM CHLORIDE 0.9 % (FLUSH) 0.9 %
5-40 SYRINGE (ML) INJECTION EVERY 8 HOURS
Status: DISCONTINUED | OUTPATIENT
Start: 2020-11-19 | End: 2020-11-19 | Stop reason: HOSPADM

## 2020-11-19 RX ADMIN — Medication 10 ML: at 15:32

## 2020-11-19 RX ADMIN — HYDROCODONE BITARTRATE AND ACETAMINOPHEN 1 TABLET: 5; 325 TABLET ORAL at 21:47

## 2020-11-19 RX ADMIN — HYDROCODONE BITARTRATE AND ACETAMINOPHEN 1 TABLET: 5; 325 TABLET ORAL at 16:07

## 2020-11-19 RX ADMIN — LOSARTAN POTASSIUM 25 MG: 25 TABLET, FILM COATED ORAL at 21:43

## 2020-11-19 RX ADMIN — ATORVASTATIN CALCIUM 10 MG: 10 TABLET, FILM COATED ORAL at 21:43

## 2020-11-19 RX ADMIN — Medication 10 ML: at 21:44

## 2020-11-19 RX ADMIN — Medication 10 ML: at 06:46

## 2020-11-19 RX ADMIN — CEFAZOLIN SODIUM 2 G: 300 INJECTION, POWDER, LYOPHILIZED, FOR SOLUTION INTRAVENOUS at 21:44

## 2020-11-19 NOTE — ACP (ADVANCE CARE PLANNING)
responded to an in-basket request to assist Mr. Sabino Olvera with and Advanced Medical Directive (AMD) on [address] [facility]. Mr. Sabino Olvera was out of the room for testing at the time of the 's visit.  left the AMD form,  the Your Right To Decide brochure, and a signed Spiritual Care Card on Mr. Sabino Olvera' bedside table. 's will follow up as able and/or needed  [employer]. Shannon Hassan      Paging Service: [phone])

## 2020-11-19 NOTE — CONSULTS
Cardiology Consult    Patient: Toño Taylor MRN: [id number]  SSN: [id number]    YOB: 1960  Age: 62 y.o. Sex: male       Subjective:      Date of  Admission: 11/18/2020     Admission type: Emergency    Toño Taylor is a 62 y.o. male admitted for Bradycardia [R00.1]. Reason: junctional rhythm, dizziness   Pt with h/o ANIL, HTN, hyperlipidemia, DM2, obesity with 50 lbs weight loss with lifestyle changes this past year, C5-6 fusion surgery 6 yrs ago admit with severe dizziness and bradycardia. Has been having severe dizzy spells that are positional and was diagnosed with vertigo. By EKG was found to be in junctional rhythm, HR 30-40s bpm. He denies any prior cardiac history. No exertional chest pain, shortness of breath or palpitations. No syncope. Soc hx. Cigars, . Fam hx. No early CAD    Primary Care Provider: Amalia Saint, MD  Past Medical History:   Diagnosis Date    Hypertension       Past Surgical History:   Procedure Laterality Date    HX APPENDECTOMY      HX CERVICAL FUSION      HX HERNIA REPAIR      HX ORTHOPAEDIC      HX TONSILLECTOMY      HX TONSILLECTOMY       Family History   Problem Relation Age of Onset    Lung Disease Mother     Diabetes Mother     Cancer Mother     Diabetes Father     Heart Disease Father     Lung Disease Father       Social History     Tobacco Use    Smoking status: Never Smoker    Smokeless tobacco: Never Used    Tobacco comment: occ   Substance Use Topics    Alcohol use: Yes     Alcohol/week: 4.0 standard drinks     Types: 4 Cans of beer per week     Comment: on saturday      No current facility-administered medications for this encounter. Allergies   Allergen Reactions    Lisinopril Cough        Review of Systems:  A comprehensive review of systems was negative except for that written in the History of Present Illness.        Subjective:     Visit Vitals  BP (!) 153/88 (BP 1 Location: Left arm, BP Patient Position: At rest) Pulse (!) 34   Temp 97.6 °F (36.4 °C)   Resp 18   SpO2 97%        Physical Exam:  Visit Vitals  BP (!) 153/88 (BP 1 Location: Left arm, BP Patient Position: At rest)   Pulse (!) 34   Temp 97.6 °F (36.4 °C)   Resp 18   SpO2 97%     General Appearance:  Well developed, well nourished,alert and oriented x 3, and individual in no acute distress. Ears/Nose/Mouth/Throat:   Hearing grossly normal.         Neck: Supple. No JVD   Chest:   Lungs clear to auscultation bilaterally. Cardiovascular:  Irregularly regular, nael, S1, S2 normal, no murmur. Abdomen:   Soft, non-tender, bowel sounds are active. Extremities: No edema bilaterally. Skin: Warm and dry. Cardiographics:  Telemetry: junctional bradycardia  ECG: junctional bradycardia  Echocardiogram: pending    Data Reviewed: All lab results for the last 24 hours reviewed. Assessment/Plan:         Hospital Problems  Date Reviewed: 11/18/2020          Codes Class Noted POA    Bradycardia ICD-10-CM: R00.1  ICD-9-CM: 427.89  11/18/2020 Unknown            Sick sinus syndrome. Discussed findings with pt and wife. No jarod agents or clear exacerbating factors to his rhythm. Will have our EP Dr. Dakota Bland see pt in AM for probable pacemaker. NPO after breakfast per Dr. Dakota Bland. Vertigo. ANIL  HTN. Avoid jarod agents.

## 2020-11-19 NOTE — PROGRESS NOTES
Pacemaker is done  May go home from EP standpoint  Future Appointments   Date Time Provider Dorothy Leon   12/4/2020  9:40 AM PACEMAKER3, BHUPINDER PERERA   12/4/2020 10:00 AM WOUND CHECKS, BHUPINDER ORTIZ AMB

## 2020-11-19 NOTE — PROGRESS NOTES
Cardiology Progress Note      11/19/2020     Admit Date: 11/18/2020    Admit Diagnosis: Bradycardia [R00.1]      Subjective:     Morris Bridges no specific complaints from overnight. Predominantly junctional nael overnight. Visit Vitals  BP (!) 143/83   Pulse (!) 40   Temp 98.5 °F (36.9 °C)   Resp 18   Ht 6' (1.829 m)   Wt 259 lb (117.5 kg)   SpO2 95%   BMI 35.13 kg/m²     Current Facility-Administered Medications   Medication Dose Route Frequency    sodium chloride (NS) flush 5-40 mL  5-40 mL IntraVENous Q8H    sodium chloride (NS) flush 5-40 mL  5-40 mL IntraVENous PRN    ceFAZolin (ANCEF) 2 g/20 mL in sterile water IV syringe  2 g IntraVENous ONCE    ondansetron (ZOFRAN) injection 4 mg  4 mg IntraVENous Q4H PRN    ceFAZolin 1 g in sodium chloride irrigation 0.9 % 500 mL Irrigation   Irrigation ONCE    sodium chloride (NS) flush 5-40 mL  5-40 mL IntraVENous Q8H    sodium chloride (NS) flush 5-40 mL  5-40 mL IntraVENous PRN    acetaminophen (TYLENOL) tablet 650 mg  650 mg Oral Q6H PRN    Or    acetaminophen (TYLENOL) suppository 650 mg  650 mg Rectal Q6H PRN    losartan (COZAAR) tablet 25 mg  25 mg Oral QHS    atorvastatin (LIPITOR) tablet 10 mg  10 mg Oral QHS         Objective:      Physical Exam:  Visit Vitals  BP (!) 143/83   Pulse (!) 40   Temp 98.5 °F (36.9 °C)   Resp 18   Ht 6' (1.829 m)   Wt 259 lb (117.5 kg)   SpO2 95%   BMI 35.13 kg/m²     General Appearance:  Well developed, well nourished,alert and oriented x 3, and individual in no acute distress. Ears/Nose/Mouth/Throat:   Hearing grossly normal.         Neck: Supple. Chest:   Lungs clear to auscultation bilaterally. Cardiovascular:  Regular rate and rhythm, S1, S2 normal, no murmur. Abdomen:   Soft, non-tender, bowel sounds are active. Extremities: No edema bilaterally. Skin: Warm and dry.                Data Review:   Labs:    Recent Results (from the past 24 hour(s))   EKG, 12 LEAD, INITIAL    Collection Time: 11/18/20  4:25 PM   Result Value Ref Range    Ventricular Rate 32 BPM    Atrial Rate 30 BPM    QRS Duration 150 ms    Q-T Interval 526 ms    QTC Calculation (Bezet) 383 ms    Calculated R Axis 72 degrees    Calculated T Axis 47 degrees    Diagnosis       Idioventricular rhythm  No previous ECGs available  Confirmed by Karine Jackson M.D., Darina Reading ([id number]) on 11/19/2020 10:51:01 AM     CBC WITH AUTOMATED DIFF    Collection Time: 11/18/20  4:29 PM   Result Value Ref Range    WBC 6.8 4.1 - 11.1 K/uL    RBC 4.99 4. 10 - 5.70 M/uL    HGB 15.5 12.1 - 17.0 g/dL    HCT 45.8 36.6 - 50.3 %    MCV 91.8 80.0 - 99.0 FL    MCH 31.1 26.0 - 34.0 PG    MCHC 33.8 30.0 - 36.5 g/dL    RDW 12.4 11.5 - 14.5 %    PLATELET 730 594 - 107 K/uL    MPV 11.9 8.9 - 12.9 FL    NRBC 0.0 0  WBC    ABSOLUTE NRBC 0.00 0.00 - 0.01 K/uL    NEUTROPHILS 56 32 - 75 %    LYMPHOCYTES 34 12 - 49 %    MONOCYTES 7 5 - 13 %    EOSINOPHILS 2 0 - 7 %    BASOPHILS 1 0 - 1 %    IMMATURE GRANULOCYTES 0 0.0 - 0.5 %    ABS. NEUTROPHILS 3.9 1.8 - 8.0 K/UL    ABS. LYMPHOCYTES 2.3 0.8 - 3.5 K/UL    ABS. MONOCYTES 0.5 0.0 - 1.0 K/UL    ABS. EOSINOPHILS 0.1 0.0 - 0.4 K/UL    ABS. BASOPHILS 0.1 0.0 - 0.1 K/UL    ABS. IMM. GRANS. 0.0 0.00 - 0.04 K/UL    DF AUTOMATED     METABOLIC PANEL, COMPREHENSIVE    Collection Time: 11/18/20  4:29 PM   Result Value Ref Range    Sodium 138 136 - 145 mmol/L    Potassium 5.0 3.5 - 5.1 mmol/L    Chloride 105 97 - 108 mmol/L    CO2 25 21 - 32 mmol/L    Anion gap 8 5 - 15 mmol/L    Glucose 96 65 - 100 mg/dL    BUN 15 6 - 20 MG/DL    Creatinine 0.77 0.70 - 1.30 MG/DL    BUN/Creatinine ratio 19 12 - 20      GFR est AA >60 >60 ml/min/1.73m2    GFR est non-AA >60 >60 ml/min/1.73m2    Calcium 8.9 8.5 - 10.1 MG/DL    Bilirubin, total 0.8 0.2 - 1.0 MG/DL    ALT (SGPT) 46 12 - 78 U/L    AST (SGOT) 49 (H) 15 - 37 U/L    Alk.  phosphatase 67 45 - 117 U/L    Protein, total 7.4 6.4 - 8.2 g/dL    Albumin 3.9 3.5 - 5.0 g/dL    Globulin 3.5 2.0 - 4.0 g/dL    A-G Ratio 1.1 1.1 - 2.2     TROPONIN I    Collection Time: 11/18/20  4:29 PM   Result Value Ref Range    Troponin-I, Qt. <0.05 <0.05 ng/mL   MAGNESIUM    Collection Time: 11/18/20  4:29 PM   Result Value Ref Range    Magnesium 2.3 1.6 - 2.4 mg/dL   TSH 3RD GENERATION    Collection Time: 11/18/20  4:29 PM   Result Value Ref Range    TSH 2.06 0.36 - 8.28 uIU/mL   METABOLIC PANEL, BASIC    Collection Time: 11/19/20  3:31 AM   Result Value Ref Range    Sodium 139 136 - 145 mmol/L    Potassium 3.8 3.5 - 5.1 mmol/L    Chloride 110 (H) 97 - 108 mmol/L    CO2 26 21 - 32 mmol/L    Anion gap 3 (L) 5 - 15 mmol/L    Glucose 124 (H) 65 - 100 mg/dL    BUN 14 6 - 20 MG/DL    Creatinine 0.85 0.70 - 1.30 MG/DL    BUN/Creatinine ratio 16 12 - 20      GFR est AA >60 >60 ml/min/1.73m2    GFR est non-AA >60 >60 ml/min/1.73m2    Calcium 8.7 8.5 - 10.1 MG/DL   MAGNESIUM    Collection Time: 11/19/20  3:31 AM   Result Value Ref Range    Magnesium 2.2 1.6 - 2.4 mg/dL   CBC WITH AUTOMATED DIFF    Collection Time: 11/19/20  3:31 AM   Result Value Ref Range    WBC 5.9 4.1 - 11.1 K/uL    RBC 4.63 4.10 - 5.70 M/uL    HGB 14.6 12.1 - 17.0 g/dL    HCT 43.1 36.6 - 50.3 %    MCV 93.1 80.0 - 99.0 FL    MCH 31.5 26.0 - 34.0 PG    MCHC 33.9 30.0 - 36.5 g/dL    RDW 12.3 11.5 - 14.5 %    PLATELET 309 013 - 493 K/uL    MPV 11.9 8.9 - 12.9 FL    NRBC 0.0 0  WBC    ABSOLUTE NRBC 0.00 0.00 - 0.01 K/uL    NEUTROPHILS 51 32 - 75 %    LYMPHOCYTES 37 12 - 49 %    MONOCYTES 7 5 - 13 %    EOSINOPHILS 4 0 - 7 %    BASOPHILS 1 0 - 1 %    IMMATURE GRANULOCYTES 0 0.0 - 0.5 %    ABS. NEUTROPHILS 3.0 1.8 - 8.0 K/UL    ABS. LYMPHOCYTES 2.1 0.8 - 3.5 K/UL    ABS. MONOCYTES 0.4 0.0 - 1.0 K/UL    ABS. EOSINOPHILS 0.2 0.0 - 0.4 K/UL    ABS. BASOPHILS 0.0 0.0 - 0.1 K/UL    ABS. IMM.  GRANS. 0.0 0.00 - 0.04 K/UL    DF AUTOMATED     ECHO ADULT COMPLETE    Collection Time: 11/19/20  9:18 AM   Result Value Ref Range    IVSd 1.05 (A) 0.6 - 1.0 cm    LVIDd 5.88 4.2 - 5.9 cm    LVIDs 3.48 cm LVOT d 2.49 cm    LVPWd 1.25 (A) 0.6 - 1.0 cm    LVOT Peak Gradient 5.36 mmHg    LVOT Peak Velocity 115.77 cm/s    RVIDd 6.43 cm    RVSP 37.06 mmHg    Left Atrium Major Axis 4.54 cm    LA Volume 66.90 18 - 58 mL    LA Area 4C 19.22 cm2    LA Vol 2C 73.73 (A) 18 - 58 mL    LA Vol 4C 51.78 18 - 58 mL    Est. RA Pressure 10.00 mmHg    Aortic Valve Area by Continuity of Peak Velocity 4.08 cm2    AoV PG 7.62 mmHg    Aortic Valve Systolic Peak Velocity 702.54 cm/s    MV A Wesley 21.30 cm/s    Mitral Valve E Wave Deceleration Time 266.11 ms    MV E Wesley 117.38 cm/s    E/E' ratio (averaged) 32.07     E/E' lateral 33.25     E/E' septal 30.89     LV E' Lateral Velocity 3.53 cm/s    LV E' Septal Velocity 3.80 cm/s    Mitral Valve Pressure Half-time 77.17 ms    MVA (PHT) 2.85 cm2    Pulmonic Regurgitant End Max Velocity 51.62 cm/s    Pulmonic Valve Systolic Peak Instantaneous Gradient 2.28 mmHg    Pulmonic Valve Max Velocity 75.43 cm/s    Tapse 3.06 (A) 1.5 - 2.0 cm    Triscuspid Valve Regurgitation Peak Gradient 27.06 mmHg    TR Max Velocity 260.12 cm/s    Ao Root D 3.91 cm    MV E/A 5.51     LV Mass .8 88 - 224 g    LV Mass AL Index 120.6 49 - 115 g/m2    Left Atrium Minor Axis 1.91 cm    LA Vol Index 28.14 16 - 28 ml/m2    LA Vol Index 31.01 16 - 28 ml/m2    LA Vol Index 21.78 16 - 28 ml/m2    LAUREN/BSA Pk Wesley 1.7 cm2/m2       Telemetry: junctional rhythm      Assessment:     Principal Problem:    Bradycardia (11/18/2020)    Active Problems:    Type 2 diabetes mellitus without complication, without long-term current use of insulin ([facility] [address] (4/18/2019)      Essential hypertension (4/18/2019)      Dyslipidemia (11/19/2020)      Junctional bradycardia (11/18/2020)      Overview: Added automatically from request for surgery [id number]        Plan:     Sick sinus syndrome. Plan for pacemaker with Dr. Idell Se. Echo was overall normal.  Vertigo. ANIL  HTN. Avoid jarod agents.

## 2020-11-19 NOTE — PROGRESS NOTES
Problem: Arrhythmia Pathway (Adult)  Goal: *Absence of arrhythmia  Outcome: Progressing Towards Goal

## 2020-11-19 NOTE — H&P
[address] [facility] Adult  Hospitalist Group  History and Physical    Primary Care Provider: Eloy Lam MD  Date of Service:  11/19/2020    Subjective:     Kate Hodge is a 62 y.o. male past medical history of hypertension, DM II, and dyslipidemia who presents from short [facility] ED for bradycardia to the 30s. He initially presented with a chief complaint of dizziness, that was thought to be in inner ear abnormality. He was incidentally found to have bradycardia to the 30s. Cardiology was consulted, and commended transfer to [facility] for EP evaluation with poss. pacemaker placement. Patient reports that the dizziness has been present for 5 days. It was worse when he went from a supine/seated to a standing position. He denies chest pain, shortness of breath, nausea, vomiting, or palpitations. He smokes cigars occasionally, and denies a family history of cardiovascular disease. Review of Systems:    A comprehensive review of systems was negative except for that written in the History of Present Illness. Past Medical History:   Diagnosis Date    Hypertension       Past Surgical History:   Procedure Laterality Date    HX APPENDECTOMY      HX CERVICAL FUSION      HX HERNIA REPAIR      HX ORTHOPAEDIC      HX TONSILLECTOMY      HX TONSILLECTOMY       Prior to Admission medications    Medication Sig Start Date End Date Taking? Authorizing Provider   atorvastatin (LIPITOR) 10 mg tablet Take 1 Tab by mouth daily.  8/7/20  Yes Enmanuel Tavares MD   losartan (COZAAR) 25 mg tablet Take 1 tablet by mouth once daily 8/7/20  Yes Eloy Lam MD   metFORMIN ER (GLUCOPHAGE XR) 500 mg tablet TAKE 1 TABLET BY MOUTH ONCE DAILY WITH DINNER 8/7/20  Yes Asad, Jim Barthel, MD   glucose blood VI test strips (FREESTYLE LITE STRIPS) strip Check fasting blood sugar in AM X 1. 7/19/19  Yes Enmanuel Tavares MD   OTHER Indications: Black Cherry Extract   Yes Provider, Historical   Blood-Glucose Meter (FREESTYLE LITE METER) monitoring kit Check fasting blood sugar in AM X 1. 4/18/19  Yes Dequan Tobin MD   lancets misc Check fasting blood sugar in AM X 1. 4/18/19  Yes Enmanuel Tavares MD   MELATONIN PO Take  by mouth nightly as needed. Yes Provider, Historical   aspirin delayed-release 81 mg tablet Take  by mouth daily. Yes Provider, Historical   multivitamin (ONE A DAY) tablet Take 1 Tab by mouth daily. Yes Provider, Historical     Allergies   Allergen Reactions    Lisinopril Cough      Family History   Problem Relation Age of Onset    Lung Disease Mother     Diabetes Mother     Cancer Mother     Diabetes Father     Heart Disease Father     Lung Disease Father         SOCIAL HISTORY:  Patient resides at Home. Patient ambulates independently. Smoking history: occasional cigar  Alcohol history: 4 beers on Saturdays      Objective:       Physical Exam:   Visit Vitals  /73 (BP 1 Location: Left arm, BP Patient Position: At rest)   Pulse (!) 35   Temp 98.2 °F (36.8 °C)   Resp 18   Wt 117.8 kg (259 lb 12.8 oz)   SpO2 97%   BMI 35.24 kg/m²     General:  Alert, cooperative, no distress, appears stated age. Head:  Normocephalic, without obvious abnormality, atraumatic. Eyes:  Conjunctivae/corneas clear. EOMs intact. Throat: Lips, mucosa, and tongue normal. Teeth and gums normal.   Neck: Supple, symmetrical, trachea midline, no adenopathy, thyroid: no enlargement/tenderness/nodules, no carotid bruit and no JVD. Back:   Symmetric, no curvature. ROM normal.    Lungs:   Clear to auscultation bilaterally. Chest wall:  No tenderness or deformity. Heart:  Bradycardia, rhythm regular S1, S2 normal, no murmur, click, rub or gallop. Abdomen:   Soft, non-tender. Bowel sounds normal.            Extremities: Extremities normal, atraumatic, no cyanosis or edema.    Pulses: 2+ radial pulses   Skin: Skin color, texture, turgor normal. No rashes or lesions             ECG:  Junctional escape to 32 bpm, RBBB    Recent Results (from the past 24 hour(s))   EKG, 12 LEAD, INITIAL    Collection Time: 11/18/20  4:25 PM   Result Value Ref Range    Ventricular Rate 32 BPM    Atrial Rate 30 BPM    QRS Duration 150 ms    Q-T Interval 526 ms    QTC Calculation (Bezet) 383 ms    Calculated R Axis 72 degrees    Calculated T Axis 47 degrees    Diagnosis       Idioventricular rhythm  Right bundle branch block  Abnormal ECG  No previous ECGs available     CBC WITH AUTOMATED DIFF    Collection Time: 11/18/20  4:29 PM   Result Value Ref Range    WBC 6.8 4.1 - 11.1 K/uL    RBC 4.99 4. 10 - 5.70 M/uL    HGB 15.5 12.1 - 17.0 g/dL    HCT 45.8 36.6 - 50.3 %    MCV 91.8 80.0 - 99.0 FL    MCH 31.1 26.0 - 34.0 PG    MCHC 33.8 30.0 - 36.5 g/dL    RDW 12.4 11.5 - 14.5 %    PLATELET 606 341 - 943 K/uL    MPV 11.9 8.9 - 12.9 FL    NRBC 0.0 0  WBC    ABSOLUTE NRBC 0.00 0.00 - 0.01 K/uL    NEUTROPHILS 56 32 - 75 %    LYMPHOCYTES 34 12 - 49 %    MONOCYTES 7 5 - 13 %    EOSINOPHILS 2 0 - 7 %    BASOPHILS 1 0 - 1 %    IMMATURE GRANULOCYTES 0 0.0 - 0.5 %    ABS. NEUTROPHILS 3.9 1.8 - 8.0 K/UL    ABS. LYMPHOCYTES 2.3 0.8 - 3.5 K/UL    ABS. MONOCYTES 0.5 0.0 - 1.0 K/UL    ABS. EOSINOPHILS 0.1 0.0 - 0.4 K/UL    ABS. BASOPHILS 0.1 0.0 - 0.1 K/UL    ABS. IMM. GRANS. 0.0 0.00 - 0.04 K/UL    DF AUTOMATED     METABOLIC PANEL, COMPREHENSIVE    Collection Time: 11/18/20  4:29 PM   Result Value Ref Range    Sodium 138 136 - 145 mmol/L    Potassium 5.0 3.5 - 5.1 mmol/L    Chloride 105 97 - 108 mmol/L    CO2 25 21 - 32 mmol/L    Anion gap 8 5 - 15 mmol/L    Glucose 96 65 - 100 mg/dL    BUN 15 6 - 20 MG/DL    Creatinine 0.77 0.70 - 1.30 MG/DL    BUN/Creatinine ratio 19 12 - 20      GFR est AA >60 >60 ml/min/1.73m2    GFR est non-AA >60 >60 ml/min/1.73m2    Calcium 8.9 8.5 - 10.1 MG/DL    Bilirubin, total 0.8 0.2 - 1.0 MG/DL    ALT (SGPT) 46 12 - 78 U/L    AST (SGOT) 49 (H) 15 - 37 U/L    Alk.  phosphatase 67 45 - 117 U/L    Protein, total 7.4 6.4 - 8.2 g/dL Albumin 3.9 3.5 - 5.0 g/dL    Globulin 3.5 2.0 - 4.0 g/dL    A-G Ratio 1.1 1.1 - 2.2     TROPONIN I    Collection Time: 11/18/20  4:29 PM   Result Value Ref Range    Troponin-I, Qt. <0.05 <0.05 ng/mL   MAGNESIUM    Collection Time: 11/18/20  4:29 PM   Result Value Ref Range    Magnesium 2.3 1.6 - 2.4 mg/dL   TSH 3RD GENERATION    Collection Time: 11/18/20  4:29 PM   Result Value Ref Range    TSH 2.06 0.36 - 3.74 uIU/mL   CBC WITH AUTOMATED DIFF    Collection Time: 11/19/20  3:31 AM   Result Value Ref Range    WBC 5.9 4.1 - 11.1 K/uL    RBC 4.63 4.10 - 5.70 M/uL    HGB 14.6 12.1 - 17.0 g/dL    HCT 43.1 36.6 - 50.3 %    MCV 93.1 80.0 - 99.0 FL    MCH 31.5 26.0 - 34.0 PG    MCHC 33.9 30.0 - 36.5 g/dL    RDW 12.3 11.5 - 14.5 %    PLATELET 980 130 - 102 K/uL    MPV 11.9 8.9 - 12.9 FL    NRBC 0.0 0  WBC    ABSOLUTE NRBC 0.00 0.00 - 0.01 K/uL    NEUTROPHILS 51 32 - 75 %    LYMPHOCYTES 37 12 - 49 %    MONOCYTES 7 5 - 13 %    EOSINOPHILS 4 0 - 7 %    BASOPHILS 1 0 - 1 %    IMMATURE GRANULOCYTES 0 0.0 - 0.5 %    ABS. NEUTROPHILS 3.0 1.8 - 8.0 K/UL    ABS. LYMPHOCYTES 2.1 0.8 - 3.5 K/UL    ABS. MONOCYTES 0.4 0.0 - 1.0 K/UL    ABS. EOSINOPHILS 0.2 0.0 - 0.4 K/UL    ABS. BASOPHILS 0.0 0.0 - 0.1 K/UL    ABS. IMM. GRANS. 0.0 0.00 - 0.04 K/UL    DF AUTOMATED       Xr Chest Port    Result Date: 11/18/2020  IMPRESSION: No acute process on portable chest.     Data Review: All diagnostic labs and studies have been reviewed. Chest x-ray was negative for acute cardiopulmonary process. Assessment:     Principal Problem:    Bradycardia (11/18/2020)    Active Problems:    Type 2 diabetes mellitus without complication, without long-term current use of insulin ([facility] [address] (4/18/2019)      Essential hypertension (4/18/2019)      Dyslipidemia (11/19/2020)        Plan:     #Bradycardia  EKG with junctional escape rhythm to 30's, hemodynamically stable. TSH 2.06, troponin <0.05. No hx CCB, BB, or dig use.   Plan:  -cardiology consulted (Dr. Margot Cerda), Dr. Cam Landon from EP scheduled to see patient in the AM  -telemetry    #Hypertension  -continue home losartan 25mg daily    #DM II  -hold home metformin, and ASA  -continue lipitor  -admission glucose 96, and HgbA1c 6% 7/2020 will hold IP insulin for now    dvt ppx: SCD  Code: full  MPOA: Jessica Friedman (wife) [phone]    FUNCTIONAL STATUS PRIOR TO HOSPITALIZATION Ambulates Independently     Signed By: Maria Del Rosario Pickard MD     November 19, 2020

## 2020-11-19 NOTE — PROCEDURES
Cardiac Procedure Note   Patient: Stacia Pearce  MRN: [id number]  SSN: [id number]   YOB: 1960 Age: 62 y.o.   Sex: male    Date of Procedure: 11/19/2020   Pre-procedure Diagnosis: sick sinus syndrome, dizziness  Post-procedure Diagnosis: same  Procedure: Permanent Pacemaker Insertion  :  Dr. Evelyne Estrada MD    Assistant(s):  None  Anesthesia: Moderate Sedation   Estimated Blood Loss: Less than 20 mL   Specimens Removed: None  Findings: RA anterior wall lead  RV distal septal lead  Complications: None   Implants: [employer]tronic dual chamber pacemaker  Signed by:  Evelyne Estrada MD  11/19/2020  12:42 PM

## 2020-11-19 NOTE — ED NOTES
TRANSFER - OUT REPORT:    Verbal report given to Milly(name) on Sudeep Panda  being transferred to (unit) for routine progression of care       Report consisted of patients Situation, Background, Assessment and   Recommendations(SBAR). Information from the following report(s) SBAR and ED Summary was reviewed with the receiving nurse. Lines:   Peripheral IV 11/18/20 Right Antecubital (Active)   Site Assessment Clean, dry, & intact 11/18/20 1636   Phlebitis Assessment 0 11/18/20 1636   Infiltration Assessment 0 11/18/20 1636       Peripheral IV 11/18/20 Left Antecubital (Active)   Site Assessment Clean, dry, & intact 11/18/20 1636   Phlebitis Assessment 0 11/18/20 1636   Infiltration Assessment 0 11/18/20 1636        Opportunity for questions and clarification was provided.       Patient transported with:   Monitor

## 2020-11-19 NOTE — PROGRESS NOTES
TRANSITIONS OF CARE PLAN:   1. DESTINATION: Likely own home  2. TRANSPORT: Spouse    3. ADDITIONAL SUPPORT: Spouse  4. DME: CPAP, Glucometer, BP Cuff  5. HOME HEALTH: TBD     6. CODE STATUS/AMD STATUS: Full Code; not on file  7. FOLLOW UP APPOINTMENTS: PCP, Cardio  8. STILL NEEDS: Monitoring Post Op    Reason for Admission:   Bradycardia                   RUR Score:          N/A; RRAT: 2             Plan for utilizing home health:      TBD    PCP: First and Last name:  Dr. Albino Jon   Name of Practice:    Are you a current patient: Yes/No: yes   Approximate date of last visit: August   Can you participate in a virtual visit with your PCP: yes                    Current Advanced Directive/Advance Care Plan: Full Code; not on file                         Transition of Care Plan:   CM met with patient, with patient alert and oriented x 4. Patient is POD#0 from pacer placement. Patient is independent in [place] to include driving. Patient lives with spouse and mother in law in a 2 story home, 8 exterior steps, first floor living. Patient has no hx of HH or Rehab. [facility] [employer] Likely disposition is for discharge to own home with transport via spouse. Observation notice provided in writing to patient and/or caregiver as well as verbal explanation of the policy. Patients who are in outpatient status also receive the Observation notice. Care Management Interventions  PCP Verified by CM: Yes(last seen by Dr. Ramona Baca in August)  Palliative Care Criteria Met (RRAT>21 & CHF Dx)?: No  Mode of Transport at Discharge:  Other (see comment)(wife to transport)  Transition of Care Consult (CM Consult): Discharge Planning  MyChart Signup: Yes  Discharge Durable Medical Equipment: No(has CPAP, Glucometer, BP Cuff)  Health Maintenance Reviewed: Yes(cm met with patient, with patient alert and oriented x4)  Physical Therapy Consult: No  Occupational Therapy Consult: No  Speech Therapy Consult: No  Current Support Network: Own Home, Lives with Spouse, Family Lives Nearby  Confirm Follow Up Transport: Self(independent in [place], to include driving)  [address] Provided?: No  Discharge Location  Discharge Placement: Home with family assistance(lives with spouse and mother in law in a 2 story home, 10 exterior steps, first floor living, 13 interior steps)  CRM: Keshawn Phelan, MPH, [address] [place] [address]; Z: [phone]

## 2020-11-19 NOTE — PROGRESS NOTES
Cardiac Cath Lab Procedure Area Arrival Note:    Piedad Alberto arrived to Cardiac Cath Lab, Procedure Area. Patient identifiers verified with NAME and DATE OF BIRTH. Procedure verified with patient. Consent forms verified. Allergies verified. Patient informed of procedure and plan of care. Questions answered with review. Patient voiced understanding of procedure and plan of care. Patient on cardiac monitor, non-invasive blood pressure, SPO2 monitor. On ra, placed on O2 @ 2 lpm via nc. IV of ns on pump at 25 ml/hr. Patient status doing well without problems. Patient is A&Ox 3. Patient reports no pain. Patient medicated during procedure with orders obtained and verified by Dr. Morris Floyd. Refer to patients Cardiac Cath Lab PROCEDURE REPORT for vital signs, assessment, status, and response during procedure, printed at end of case. Printed report on chart or scanned into chart.

## 2020-11-19 NOTE — PROGRESS NOTES
11/19/20 0800   Vitals   Temp 98.5 °F (36.9 °C)   Temp Source Oral   Pulse (Heart Rate) (!) 34  (RN Notified)   Heart Rate Source Monitor   Resp Rate 18   O2 Sat (%) 94 %   Level of Consciousness Alert   /83   MAP (Calculated) 99   BP 1 Location Left arm   BP 1 Method Automatic   BP Patient Position At rest   MEWS Score 3   Pain 1   Pain Scale 1 Numeric (0 - 10)   Pain Intensity 1 0   Oxygen Therapy   O2 Device Room air   Patient Observation   Observations assessment   Pt bradycardiac. Stable.  Scheduled for pacemaker today at [phone] with Dr Abdulaziz Huang

## 2020-11-19 NOTE — PROGRESS NOTES
Problem: Arrhythmia Pathway (Adult)  Goal: *Absence of arrhythmia  Outcome: Progressing Towards Goal     Problem: Syncope  Goal: *Absence of injury  Outcome: Progressing Towards Goal

## 2020-11-19 NOTE — PROGRESS NOTES
Spiritual Care Assessment/Progress Note  [facility]      NAME: Greg Young      MRN: [id number]  AGE: 62 y.o.  SEX: male  Episcopalian Affiliation: Non Shinto   Language: English     11/19/2020     Total Time (in minutes): 8     Spiritual Assessment begun in [address] CATH LAB through conversation with:         [x]Patient        [] Family    [] Friend(s)        Reason for Consult: Advance medical directive consult     Spiritual beliefs: (Please include comment if needed)     [] Identifies with a jacek tradition:         [] Supported by a jacek community:            [] Claims no spiritual orientation:           [] Seeking spiritual identity:                [] Adheres to an individual form of spirituality:           [x] Not able to assess:                           Identified resources for coping:      [] Prayer                               [] Music                  [] Guided Imagery     [] Family/friends                 [] Pet visits     [] Devotional reading                         [x] Unknown     [] Other:                                             Interventions offered during this visit: (See comments for more details)    Patient Interventions: Advance medical directive consult, Other (comment)(Unable to assess)           Plan of Care:     [] Support spiritual and/or cultural needs    [] Support AMD and/or advance care planning process      [] Support grieving process   [] Coordinate Rites and/or Rituals    [] Coordination with community clergy   [] No spiritual needs identified at this time   [] Detailed Plan of Care below (See Comments)  [] Make referral to Music Therapy  [] Make referral to Pet Therapy     [] Make referral to Addiction services  [] Make referral to [facility]  [] Make referral to Spiritual Care Partner  [] No future visits requested        [x] Follow up visits as needed     Comments:      responded to an in-basket request to assist Mr. Colin Marquez with and Advanced Medical Directive (AMD) [facility]. Mr. Heather Hurd was out of the room for testing at the time of the 's visit.  left the AMD form,  the Your Right To Decide brochure, and a signed Spiritual Care Card on Mr. Heather Hurd' bedside table. 's will follow up as able and/or needed  [employer]. Meme Russell.      Paging Service: [phone])

## 2020-11-19 NOTE — CONSULTS
Cardiac Electrophysiology Hospital Consultation Note     Subjective:      Anne Youngblood is a 62 y.o. patient who is seen for evaluation/management of junctional bradycardia. He presented to the ER on 11/18/2020, sent from PCP office. Reported dizziness since 11/13/2020. PCP noted bradycardia in the 30s. ECG in the ER showed junctional bradycardia 32 bpm with RBBB (new since last ECG in 0/2013). Troponin negative. TSH WNL. Other labs essentially unremarkable. Echo ordered, pending. Consistently with junctional bradycardia 30s on telemetry. BP stable. He denies chest pain, palpitations, SOB, PND, orthopnea, syncope, or edema. Previous:  Lost 50+ lbs since 02/2020 with lifestyle changes. ANIL.         Problem List  Date Reviewed: 11/18/2020          Codes Class Noted    Dyslipidemia ICD-10-CM: E78.5  ICD-9-CM: 272.4  11/19/2020        * (Principal) Bradycardia ICD-10-CM: R00.1  ICD-9-CM: 427.89  11/18/2020        Junctional bradycardia ICD-10-CM: R00.1  ICD-9-CM: 427.89  11/18/2020    Overview Signed 11/19/2020  7:31 AM by Karl Abraham NP     Added automatically from request for surgery [id number]             Type 2 diabetes mellitus without complication, without long-term current use of insulin ([facility] ICD-10-CM: E11.9  ICD-9-CM: 250.00  4/18/2019        Essential hypertension ICD-10-CM: I10  ICD-9-CM: 401.9  4/18/2019        Obesity, morbid ([facility] ICD-10-CM: E66.01  ICD-9-CM: 278.01  3/2/2018        Sleep apnea in adult ICD-10-CM: G47.30  ICD-9-CM: 327.23  4/17/2017              Current Facility-Administered Medications   Medication Dose Route Frequency Provider Last Rate Last Dose    sodium chloride (NS) flush 5-40 mL  5-40 mL IntraVENous Q8H Alexa Resendiz MD   10 mL at 11/19/20 0646    sodium chloride (NS) flush 5-40 mL  5-40 mL IntraVENous PRN Alexa Resendiz MD        acetaminophen (TYLENOL) tablet 650 mg  650 mg Oral Q6H PRN Alexa Resendiz MD        Or   Romana Ruiz acetaminophen (TYLENOL) suppository 650 mg  650 mg Rectal Q6H PRN Jere Hoffman MD        losartan (COZAAR) tablet 25 mg  25 mg Oral QHS Jere Hoffman MD   Stopped at 11/18/20 2251    atorvastatin (LIPITOR) tablet 10 mg  10 mg Oral QHS Jere Hoffman MD   10 mg at 11/18/20 2251     Allergies   Allergen Reactions    Lisinopril Cough     Past Medical History:   Diagnosis Date    Hypertension      Past Surgical History:   Procedure Laterality Date    HX APPENDECTOMY      HX CERVICAL FUSION      HX HERNIA REPAIR      HX ORTHOPAEDIC      HX TONSILLECTOMY      HX TONSILLECTOMY       Family History   Problem Relation Age of Onset    Lung Disease Mother     Diabetes Mother     Cancer Mother     Diabetes Father     Heart Disease Father     Lung Disease Father      Social History     Tobacco Use    Smoking status: Never Smoker    Smokeless tobacco: Never Used    Tobacco comment: occ   Substance Use Topics    Alcohol use: Yes     Alcohol/week: 4.0 standard drinks     Types: 4 Cans of beer per week     Comment: on saturday        Review of Systems: Review of all other systems otherwise negative. Constitutional: Negative for fever, chills, weight loss, malaise/fatigue. HEENT: Negative for nosebleeds, vision changes. Respiratory: Negative for cough, hemoptysis  Cardiovascular: Negative for chest pain, palpitations, orthopnea, claudication, leg swelling, syncope, and PND. + dizziness  Gastrointestinal: Negative for nausea, vomiting, diarrhea, blood in stool and melena. Genitourinary: Negative for dysuria, and hematuria. Musculoskeletal: Negative for myalgias, arthralgia. Skin: Negative for rash. Heme: Does not bleed or bruise easily.    Neurological: Negative for speech change and focal weakness     Objective:     Visit Vitals  /73 (BP 1 Location: Left arm, BP Patient Position: At rest)   Pulse (!) 35   Temp 98.2 °F (36.8 °C)   Resp 18   Wt 259 lb 12.8 oz (117.8 kg)   SpO2 97%   BMI 35.24 kg/m²      Physical Exam:   Constitutional: Well-developed and well-nourished. No respiratory distress. Head: Normocephalic and atraumatic. Eyes: Pupils are equal, round. ENT: Hearing grossly normal  Neck: Supple. No JVD present. Cardiovascular: Bradycardic rate, regular rhythm. Exam reveals no gallop and no friction rub. No murmur heard. Pulmonary/Chest: Effort normal and breath sounds normal. No wheezes. Abdominal: Soft, no tenderness. Musculoskeletal: Moves extremities independently. Vasc/lymphatic: No edema. Neurological: Alert,oriented. Skin: Skin is warm and dry. Psychiatric: Normal mood and affect. Behavior is normal. Judgment and thought content normal.        Assessment/Plan:     Mr. Colin Marquez has symptomatic junctional bradycardia without known reversible cause. On no medications PTA that would contribute to bradycardia. Echo pending. If LVEF is low, would recommend cardiac cath prior to pacemaker implant. If WNL, would recommend dual chamber pacemaker. Risks/benefits of dual chamber pacemaker implant reviewed, & patient agrees to proceed with procedure later this afternoon if indicated.     ODALIS Lindsey  [facility] [place]  11/19/20      Addendum from EP attending:   I have seen, examined patient, and discussed with nurse practitioner, registered nurse, reviewed, updated note and agree with the assessment and plan    I have talked to him and his wife this am. He is mostly dizzy with standing   Losartan does not affect his HR and he has used it for years  Vital signs with junctional nael 20-30 bpm  Exam shows regular rhythm and no murmur    Assessment and Plan:  Echo this am and if wall motion abnormal I recommend cardiac cath  If echo normal, dual chamber pacer at noon  They agree  Risks involve device implant include but are not limited to bleeding, infection, valvular damage, heart attack, stroke, lung collapse (pneumothorax or hemothorax), heart collapse (pericardial tamponade), heart perforation, kidney failure, death. Elective or emergency surgery may be required to repair some of these complications. Prolonged hospitalization would be required. General anesthesia may be required for the procedure. Thank you for involving me in this patient's care and please call with further concerns or questions. Neno Mendez M.D.   Electrophysiology/Cardiology  [facility] [place]  [address] [place] [address] 91  [address] [facility], [address] [place] [address]  [phone]

## 2020-11-19 NOTE — PROGRESS NOTES
TRANSFER - OUT REPORT:    Verbal report given to [address] on Brit Moreno being transferred to  for routine progression of care       Report consisted of patients Situation, Background, Assessment and   Recommendations(SBAR). Information from the following report(s) SBAR, Procedure Summary and MAR was reviewed with the receiving nurse. Opportunity for questions and clarification was provided.

## 2020-11-20 VITALS
RESPIRATION RATE: 18 BRPM | OXYGEN SATURATION: 98 % | HEIGHT: 72 IN | HEART RATE: 70 BPM | TEMPERATURE: 98.1 F | BODY MASS INDEX: 35 KG/M2 | DIASTOLIC BLOOD PRESSURE: 70 MMHG | SYSTOLIC BLOOD PRESSURE: 132 MMHG | WEIGHT: 258.38 LBS

## 2020-11-20 LAB
ANION GAP SERPL CALC-SCNC: 6 MMOL/L (ref 5–15)
BASOPHILS # BLD: 0 K/UL (ref 0–0.1)
BASOPHILS NFR BLD: 0 % (ref 0–1)
BUN SERPL-MCNC: 15 MG/DL (ref 6–20)
BUN/CREAT SERPL: 16 (ref 12–20)
CALCIUM SERPL-MCNC: 9 MG/DL (ref 8.5–10.1)
CHLORIDE SERPL-SCNC: 107 MMOL/L (ref 97–108)
CO2 SERPL-SCNC: 25 MMOL/L (ref 21–32)
CREAT SERPL-MCNC: 0.93 MG/DL (ref 0.7–1.3)
DIFFERENTIAL METHOD BLD: ABNORMAL
EOSINOPHIL # BLD: 0.1 K/UL (ref 0–0.4)
EOSINOPHIL NFR BLD: 1 % (ref 0–7)
ERYTHROCYTE [DISTWIDTH] IN BLOOD BY AUTOMATED COUNT: 12.3 % (ref 11.5–14.5)
GLUCOSE SERPL-MCNC: 113 MG/DL (ref 65–100)
HCT VFR BLD AUTO: 48.4 % (ref 36.6–50.3)
HGB BLD-MCNC: 16.7 G/DL (ref 12.1–17)
IMM GRANULOCYTES # BLD AUTO: 0 K/UL (ref 0–0.04)
IMM GRANULOCYTES NFR BLD AUTO: 0 % (ref 0–0.5)
LYMPHOCYTES # BLD: 1.4 K/UL (ref 0.8–3.5)
LYMPHOCYTES NFR BLD: 15 % (ref 12–49)
MCH RBC QN AUTO: 31.5 PG (ref 26–34)
MCHC RBC AUTO-ENTMCNC: 34.5 G/DL (ref 30–36.5)
MCV RBC AUTO: 91.1 FL (ref 80–99)
MONOCYTES # BLD: 0.4 K/UL (ref 0–1)
MONOCYTES NFR BLD: 4 % (ref 5–13)
NEUTS SEG # BLD: 7.3 K/UL (ref 1.8–8)
NEUTS SEG NFR BLD: 80 % (ref 32–75)
NRBC # BLD: 0 K/UL (ref 0–0.01)
NRBC BLD-RTO: 0 PER 100 WBC
PLATELET # BLD AUTO: 153 K/UL (ref 150–400)
PMV BLD AUTO: 11.3 FL (ref 8.9–12.9)
POTASSIUM SERPL-SCNC: 4 MMOL/L (ref 3.5–5.1)
RBC # BLD AUTO: 5.31 M/UL (ref 4.1–5.7)
SODIUM SERPL-SCNC: 138 MMOL/L (ref 136–145)
WBC # BLD AUTO: 9.3 K/UL (ref 4.1–11.1)

## 2020-11-20 PROCEDURE — 96375 TX/PRO/DX INJ NEW DRUG ADDON: CPT

## 2020-11-20 PROCEDURE — 74011250637 HC RX REV CODE- 250/637: Performed by: FAMILY MEDICINE

## 2020-11-20 PROCEDURE — 80048 BASIC METABOLIC PNL TOTAL CA: CPT

## 2020-11-20 PROCEDURE — 74011250637 HC RX REV CODE- 250/637: Performed by: NURSE PRACTITIONER

## 2020-11-20 PROCEDURE — 85025 COMPLETE CBC W/AUTO DIFF WBC: CPT

## 2020-11-20 PROCEDURE — 36415 COLL VENOUS BLD VENIPUNCTURE: CPT

## 2020-11-20 PROCEDURE — 99218 HC RM OBSERVATION: CPT

## 2020-11-20 PROCEDURE — 74011250636 HC RX REV CODE- 250/636: Performed by: NURSE PRACTITIONER

## 2020-11-20 PROCEDURE — 74011000250 HC RX REV CODE- 250: Performed by: NURSE PRACTITIONER

## 2020-11-20 PROCEDURE — 96376 TX/PRO/DX INJ SAME DRUG ADON: CPT

## 2020-11-20 RX ORDER — HYDROCHLOROTHIAZIDE 25 MG/1
25 TABLET ORAL DAILY
Qty: 90 TAB | Refills: 2 | Status: SHIPPED | OUTPATIENT
Start: 2020-11-21 | End: 2021-08-17 | Stop reason: SDUPTHER

## 2020-11-20 RX ORDER — ONDANSETRON 2 MG/ML
4 INJECTION INTRAMUSCULAR; INTRAVENOUS
Status: DISCONTINUED | OUTPATIENT
Start: 2020-11-20 | End: 2020-11-20 | Stop reason: HOSPADM

## 2020-11-20 RX ORDER — LOSARTAN POTASSIUM 25 MG/1
TABLET ORAL
Qty: 90 TAB | Refills: 1 | Status: SHIPPED | OUTPATIENT
Start: 2020-11-20 | End: 2021-05-10 | Stop reason: SDUPTHER

## 2020-11-20 RX ORDER — HYDROCODONE BITARTRATE AND ACETAMINOPHEN 5; 325 MG/1; MG/1
1 TABLET ORAL
Qty: 20 TAB | Refills: 0 | Status: SHIPPED | OUTPATIENT
Start: 2020-11-20 | End: 2020-12-04

## 2020-11-20 RX ORDER — HYDROCHLOROTHIAZIDE 25 MG/1
25 TABLET ORAL DAILY
Status: DISCONTINUED | OUTPATIENT
Start: 2020-11-20 | End: 2020-11-20 | Stop reason: HOSPADM

## 2020-11-20 RX ORDER — ONDANSETRON 4 MG/1
4 TABLET, ORALLY DISINTEGRATING ORAL
Qty: 30 TAB | Refills: 0 | Status: SHIPPED | OUTPATIENT
Start: 2020-11-20 | End: 2020-12-14 | Stop reason: ALTCHOICE

## 2020-11-20 RX ADMIN — Medication 10 ML: at 06:44

## 2020-11-20 RX ADMIN — CEFAZOLIN SODIUM 2 G: 300 INJECTION, POWDER, LYOPHILIZED, FOR SOLUTION INTRAVENOUS at 06:44

## 2020-11-20 RX ADMIN — HYDROCODONE BITARTRATE AND ACETAMINOPHEN 1 TABLET: 5; 325 TABLET ORAL at 02:34

## 2020-11-20 RX ADMIN — HYDROCHLOROTHIAZIDE 25 MG: 25 TABLET ORAL at 10:40

## 2020-11-20 RX ADMIN — ONDANSETRON 4 MG: 2 INJECTION INTRAMUSCULAR; INTRAVENOUS at 12:44

## 2020-11-20 RX ADMIN — ONDANSETRON 4 MG: 2 INJECTION INTRAMUSCULAR; INTRAVENOUS at 02:54

## 2020-11-20 NOTE — PROGRESS NOTES
Problem: Arrhythmia Pathway (Adult)  Goal: *Absence of arrhythmia  Outcome: Progressing Towards Goal     Problem: Falls - Risk of  Goal: *Absence of Falls  Description: Document Fanta Fall Risk and appropriate interventions in the flowsheet.   Outcome: Progressing Towards Goal  Note: Fall Risk Interventions:            Medication Interventions: Patient to call before getting OOB, Teach patient to arise slowly         History of Falls Interventions: Door open when patient unattended

## 2020-11-20 NOTE — PROGRESS NOTES
Bedside shift change report given to NATHAN Cunningham  (oncoming nurse) by NATHAN Lane (offgoing nurse). Report included the following information SBAR, Kardex, Intake/Output, MAR, Accordion and Cardiac Rhythm AV PACED.

## 2020-11-20 NOTE — ROUTINE PROCESS
[facility] follow-up PCP transitional care appointment has been scheduled with Dr. Christina Young for Nov 23 @ 4PM. Pending patient discharge.   Johnie Brittle, Care Management Specialist.

## 2020-11-20 NOTE — DISCHARGE INSTRUCTIONS
Discharge recs from Dr Abhi Bowden  - regarding elevated blood pressure and borderline elevated Right heart pressure  - continue the blood pressure med/ diuretic HCTZ- 25mg daily and monitor your blood pressure  Goal BP < 140/90; Have your primary care MD monitor your kidney function, and electrolyte levels wile on this medication and adjust dose and regimen      PATIENT INSTRUCTIONS POST-PACEMAKER IMPLANT    1. No heavy lifting or exercises with the left arm for 4 weeks. This includes the following:  Do not raise arm above the shoulder level to comb hair, pull on clothes, etc... Do not use the affected arm to pull up or push up from a seated or laying  down position. Do not allow anyone else to pull on the affected arm. 2. Remove aquacel dressing in a week, or it can be removed in clinic at follow up visit if you prefer. 3.  Do not drive for 3 days. 4.  Call Dr. Erwin Hayes at [phone] if you experience any of the following symptoms:  1. Redness at the pacemaker site  2. Swelling at or around the pacemaker or in the left arm  3. Pain around the pacemaker  4. Dizziness, lightheadedness, fainting spells  5. Lack of energy  6. Shortness of breath  7. Rapid heart rate  8. Chest or muscle twitches    5. Follow-up with Dr. Guillermo Renner office as scheduled below for wound & device check. Future Appointments   Date Time Provider Dorothy Leon   11/20/2020 11:40 AM Chetna Yusuf MD [address] BS AMB   12/4/2020  9:40 AM PACEMAKER3, BHUPINDER KEMP BS AMB   12/4/2020 10:00 AM WOUND CHECKS, BHUPINDER KEMP BS AMB     6. You may use over the counter pain medication and ice pack for pain relief as needed. You may wear the sling as a reminder to keep your arm below the your shoulder. 7.  A prescription for antibiotics was sent electronically to your pharmacy on record. Please pick it up & take as directed. Bufford Babinski, M.D.  [facility]  Electrophysiology/Cardiology  [facility] [facility] [place]  [id number], [address]  [phone]

## 2020-11-20 NOTE — PROGRESS NOTES
11/20/2020 -   SHANTEL:  - RUR: N/A; RRAT: 2  - Likely disposition is for discharge to own home with transport via spouse  - Patient likely to be discharged today, 11/20    - BP is being monitored  CRM: Keshawn Phelan, MPH, [address] [place] [address]; Z: [phone]

## 2020-11-20 NOTE — PROGRESS NOTES
Problem: Arrhythmia Pathway (Adult)  Goal: *Absence of arrhythmia  Outcome: Resolved/Met     Problem: Patient Education: Go to Patient Education Activity  Goal: Patient/Family Education  Outcome: Resolved/Met     Problem: Falls - Risk of  Goal: *Absence of Falls  Description: Document Fanta Fall Risk and appropriate interventions in the flowsheet.   Outcome: Resolved/Met     Problem: Patient Education: Go to Patient Education Activity  Goal: Patient/Family Education  Outcome: Resolved/Met     Problem: Syncope  Goal: *Absence of injury  Outcome: Resolved/Met  Goal: Decrease or eliminate episodes of syncope  Outcome: Resolved/Met     Problem: Patient Education: Go to Patient Education Activity  Goal: Patient/Family Education  Outcome: Resolved/Met     Problem: Patient Education: Go to Patient Education Activity  Goal: Patient/Family Education  Outcome: Resolved/Met     Problem: Pacer/ICD: Pre-Procedure  Goal: Off Pathway (Use only if patient is Off Pathway)  Outcome: Resolved/Met  Goal: Activity/Safety  Outcome: Resolved/Met  Goal: Consults, if ordered  Outcome: Resolved/Met  Goal: Diagnostic Test/Procedures  Outcome: Resolved/Met  Goal: Nutrition/Diet  Outcome: Resolved/Met  Goal: Discharge Planning  Outcome: Resolved/Met  Goal: Medications  Outcome: Resolved/Met  Goal: Respiratory  Outcome: Resolved/Met  Goal: Treatments/Interventions/Procedures  Outcome: Resolved/Met  Goal: Psychosocial  Outcome: Resolved/Met  Goal: *Verbalize description of procedure  Outcome: Resolved/Met  Goal: *Consent signed  Outcome: Resolved/Met     Problem: Pacer/ICD: Post-Procedure  Goal: Off Pathway (Use only if patient is Off Pathway)  Outcome: Resolved/Met  Goal: Activity/Safety  Outcome: Resolved/Met  Goal: Consults, if ordered  Outcome: Resolved/Met  Goal: Diagnostic Test/Procedures  Outcome: Resolved/Met  Goal: Nutrition/Diet  Outcome: Resolved/Met  Goal: Discharge Planning  Outcome: Resolved/Met  Goal: Medications  Outcome: Resolved/Met  Goal: Respiratory  Outcome: Resolved/Met  Goal: Treatments/Interventions/Procedures  Outcome: Resolved/Met  Goal: Psychosocial  Outcome: Resolved/Met  Goal: *Hemodynamically stable  Outcome: Resolved/Met  Goal: *Optimal pain control at patient's stated goal  Outcome: Resolved/Met  Goal: *Absence of signs and symptoms of infection or wound complication  Outcome: Resolved/Met     Problem: Pacer/ICD: Day 1  Goal: Off Pathway (Use only if patient is Off Pathway)  Outcome: Resolved/Met  Goal: Activity/Safety  Outcome: Resolved/Met  Goal: Diagnostic Test/Procedures  Outcome: Resolved/Met  Goal: Nutrition/Diet  Outcome: Resolved/Met  Goal: Discharge Planning  Outcome: Resolved/Met  Goal: Medications  Outcome: Resolved/Met  Goal: Respiratory  Outcome: Resolved/Met  Goal: Treatments/Interventions/Procedures  Outcome: Resolved/Met  Goal: Psychosocial  Outcome: Resolved/Met     Problem: Pacer/ICD: Discharge Outcomes  Goal: *Hemodynamically stable  Outcome: Resolved/Met  Goal: *Stable cardiac rhythm  Outcome: Resolved/Met  Goal: *Lungs clear or at baseline  Outcome: Resolved/Met  Goal: *Demonstrates ability to perform prescribed activity without shortness of breath or discomfort  Outcome: Resolved/Met  Goal: *Verbalizes home exercise program, activity guidelines, cardiac precautions  Outcome: Resolved/Met  Goal: *Verbalizes understanding and describes prescribed diet  Outcome: Resolved/Met  Goal: *Verbalizes understanding and describes medication purposes and frequencies  Outcome: Resolved/Met  Goal: *Identifies cardiac risk factors  Outcome: Resolved/Met  Goal: *No signs and symptoms of infection or wound complications  Outcome: Resolved/Met  Goal: *Anxiety reduced or absent  Outcome: Resolved/Met  Goal: *Understands and describes signs and symptoms to report to providers(Stroke Metric)  Outcome: Resolved/Met  Goal: *Describes follow-up/return visits to physicians  Outcome: Resolved/Met  Goal: *Describes available resources and support systems  Outcome: Resolved/Met  Goal: *Influenza immunization  Outcome: Resolved/Met  Goal: *Pneumococcal immunization  Outcome: Resolved/Met  Goal: *Optimal pain control at patient's stated goal  Outcome: Resolved/Met     Problem: Discharge Planning  Goal: *Discharge to safe environment  Outcome: Resolved/Met  Goal: *Knowledge of medication management  Outcome: Resolved/Met  Goal: *Knowledge of discharge instructions  Outcome: Resolved/Met     Problem: Patient Education: Go to Patient Education Activity  Goal: Patient/Family Education  Outcome: Resolved/Met

## 2020-11-20 NOTE — DISCHARGE SUMMARY
Discharge Summary       PATIENT ID: Dannie Mishra  MRN: [id number]   YOB: 1960    DATE OF ADMISSION: 11/18/2020  4:18 PM    DATE OF DISCHARGE: 11/20/20 1pm   PRIMARY CARE PROVIDER: Seth Fernández MD     ATTENDING PHYSICIAN: Jean Puri  DISCHARGING PROVIDER: Irene Cruz MD    To contact this individual call [phone] and ask the  to page. If unavailable ask to be transferred the Adult Hospitalist Department. CONSULTATIONS: IP CONSULT TO CARDIOLOGY  IP CONSULT TO ELECTROPHYSIOLOGY    PROCEDURES/SURGERIES: Procedure(s):  INSERT PPM DUAL    ADMITTING DIAGNOSES & HOSPITAL COURSE:   Kingston Lundborg a 62 y. o. male past medical history of hypertension, DM II, and dyslipidemia who presents from short [facility] ED for bradycardia to the 30s.  He initially presented with a chief complaint of dizziness, that was thought to be in inner ear abnormality.  He was incidentally found to have bradycardia to the 30s.  Cardiology was consulted, and commended transfer to [facility] for EP evaluation with poss. pacemaker placement. DISCHARGE DIAGNOSES / PLAN:      #Bradycardia- due to sick sinus syndrome- resolved with pacemaker  EKG with junctional escape rhythm to 30's, hemodynamically stable. TSH 2.06,   troponin <0.05. No hx CCB, BB, or dig use  ECHO. EP consulted and placed pacemaker 11/19/20  DC instructions regarding pacemaker care/restrictions/follow up per cardology     #Hypertension- BP elevated  -resumed home losartan 25mg daily but BP remains elevated  Started on HCTZ for elevated BP and mildly elevated PaPressure on ECHO       #DM II  -hold home metformin, resume home diabetes regimen on DC      HLD  -continue lipitor     ADDITIONAL CARE RECOMMENDATIONS:   Discharge recs from Dr Leidy Wu  - regarding elevated blood pressure and borderline elevated Right heart pressure  - continue the blood pressure med/ diuretic HCTZ- 25mg daily and monitor your blood pressure  Goal BP < 140/90;   Have your primary care MD monitor your kidney function, and electrolyte levels wile on this medication and adjust dose and regimen      PATIENT INSTRUCTIONS POST-PACEMAKER IMPLANT    1. No heavy lifting or exercises with the left arm for 4 weeks. This includes the following:  Do not raise arm above the shoulder level to comb hair, pull on clothes, etc... Do not use the affected arm to pull up or push up from a seated or laying  down position. Do not allow anyone else to pull on the affected arm. 2. Remove aquacel dressing in a week, or it can be removed in clinic at follow up visit if you prefer. 3.  Do not drive for 3 days. 4.  Call Dr. Duc Jang at [phone] if you experience any of the following symptoms:  1. Redness at the pacemaker site  2. Swelling at or around the pacemaker or in the left arm  3. Pain around the pacemaker  4. Dizziness, lightheadedness, fainting spells  5. Lack of energy  6. Shortness of breath  7. Rapid heart rate  8. Chest or muscle twitches    5. Follow-up with Dr. Jhonatan Woo office as scheduled below for wound & device check. Future Appointments   Date Time Provider Dorothy Leon   11/20/2020 11:40 AM Pearl Grover MD [facility] BS AMB   12/4/2020  9:40 AM PACEMAKER3, BHUPINDER KEMP BS AMB   12/4/2020 10:00 AM WOUND CHECKS, BHUPINDER KEMP BS AMB     6. You may use over the counter pain medication and ice pack for pain relief as needed. You may wear the sling as a reminder to keep your arm below the your shoulder. 7.  A prescription for antibiotics was sent electronically to your pharmacy on record. Please pick it up & take as directed. Tabatha Swartz M.D.  [facility]  Electrophysiology/Cardiology  [facility] [place]  [address] , Dami Baez [address] [place] [address]  [phone]          PENDING TEST RESULTS: At the time of discharge the following test results are still pending: none    FOLLOW UP APPOINTMENTS:    Follow-up Information     Follow up With Specialties Details Why Contact Info    Jeremías Markham MD Jaime Mead [facility] [address] [phone]           DIET: diabetic heart healthy- low salt    ACTIVITY: Activity as tolerated and no driving for 3 days    WOUND CARE: per cardiology recs- regarding pacemaker site    EQUIPMENT needed: none    DISCHARGE MEDICATIONS:  Current Discharge Medication List      START taking these medications    Details   hydroCHLOROthiazide (HYDRODIURIL) 25 mg tablet Take 1 Tab by mouth daily. Qty: 90 Tab, Refills: 2      HYDROcodone-acetaminophen (NORCO) 5-325 mg per tablet Take 1 Tab by mouth every four (4) hours as needed for Pain for up to 14 days. Max Daily Amount: 6 Tabs. Qty: 20 Tab, Refills: 0    Associated Diagnoses: Pacemaker      ondansetron (Zofran ODT) 4 mg disintegrating tablet Take 1 Tab by mouth every six (6) hours as needed for Nausea or Nausea or Vomiting. Qty: 30 Tab, Refills: 0      cephALEXin (KEFLEX) 500 mg capsule Take 1 Cap by mouth three (3) times daily for 5 days. Indications: post surgical prophylaxis  Qty: 15 Cap, Refills: 0    Associated Diagnoses: Pacemaker         CONTINUE these medications which have CHANGED    Details   losartan (COZAAR) 25 mg tablet Take 1 tablet by mouth once daily in the PM  Qty: 90 Tab, Refills: 1    Associated Diagnoses: Essential hypertension         CONTINUE these medications which have NOT CHANGED    Details   atorvastatin (LIPITOR) 10 mg tablet Take 1 Tab by mouth daily.   Qty: 90 Tab, Refills: 1    Associated Diagnoses: Hyperlipidemia LDL goal <70; Type 2 diabetes mellitus without complication, without long-term current use of insulin (HCC)      metFORMIN ER (GLUCOPHAGE XR) 500 mg tablet TAKE 1 TABLET BY MOUTH ONCE DAILY WITH DINNER  Qty: 90 Tab, Refills: 1    Associated Diagnoses: Type 2 diabetes mellitus without complication, without long-term current use of insulin ([facility])      glucose blood VI test strips (FREESTYLE LITE STRIPS) strip Check fasting blood sugar in AM X 1. Qty: 50 Strip, Refills: 11    Associated Diagnoses: Type 2 diabetes mellitus without complication, without long-term current use of insulin ([facility])      OTHER Indications: Black Cherry Extract      Blood-Glucose Meter (FREESTYLE LITE METER) monitoring kit Check fasting blood sugar in AM X 1. Qty: 1 Kit, Refills: 0    Associated Diagnoses: Type 2 diabetes mellitus without complication, without long-term current use of insulin ([facility])      lancets misc Check fasting blood sugar in AM X 1. Qty: 50 Each, Refills: 11    Associated Diagnoses: Type 2 diabetes mellitus without complication, without long-term current use of insulin ([facility])      MELATONIN PO Take  by mouth nightly as needed. aspirin delayed-release 81 mg tablet Take  by mouth daily. multivitamin (ONE A DAY) tablet Take 1 Tab by mouth daily. Associated Diagnoses: Well adult on routine health check           NOTIFY YOUR PHYSICIAN FOR ANY OF THE FOLLOWING:   Fever over 101 degrees for 24 hours. Chest pain, shortness of breath, fever, chills, nausea, vomiting, diarrhea, change in mentation, falling, weakness, bleeding. Severe pain or pain not relieved by medications. Or, any other signs or symptoms that you may have questions about.     DISPOSITION:    Home With:   OT  PT  HH  RN       Long term SNF/Inpatient Rehab   X Independent/assisted living    Hospice    Other:       PATIENT CONDITION AT DISCHARGE:     Functional status    Poor     Deconditioned    X Independent      Cognition   X  Lucid     Forgetful     Dementia      Catheters/lines (plus indication)    Peña     PICC     PEG    X None      Code status    X Full code     DNR      PHYSICAL EXAMINATION AT DISCHARGE:  Patient Vitals for the past 24 hrs:   Temp Pulse Resp BP SpO2   11/20/20 1040  70  (!) 140/76    11/20/20 0800 98.2 °F (36.8 °C) 70 18 (!) 152/78 97 %   11/20/20 0257 98 °F (36.7 °C) 70 18 (!) 150/84 98 %   11/19/20 2320 97.7 °F (36.5 °C) 70 16 (!) 161/92 97 %   11/19/20 2001 97.9 °F (36.6 °C) 70 16 (!) 167/84 95 %   11/19/20 1649 97.6 °F (36.4 °C) 70 16 (!) 164/95 97 %   11/19/20 1500  70  (!) 153/87 95 %   11/19/20 1400  70  (!) 144/90 95 %     General:          Alert, cooperative, no distress, appears stated age. HEENT:           Atraumatic, anicteric sclerae, EOMI  Neck:               Supple, symmetrical  Lungs:             Clear to auscultation bilaterally. No Wheezing or Rhonchi. No rales. Chest wall:      minimal swelling around pacemaker insertion site  Heart:              Regular  rhythm,  No  murmur   No edema  Abdomen:        Soft, non-tender. Not distended. Bowel sounds normal  Extremities:     No cyanosis. No clubbing,  l  Skin:                Not pale. Not Jaundiced  No rashes   Psych:             Not anxious or agitated.   Neurologic:      Alert, moves all extremities, answers questions appropriately and responds to commands    Recent Results (from the past 24 hour(s))   METABOLIC PANEL, BASIC    Collection Time: 11/20/20  2:38 AM   Result Value Ref Range    Sodium 138 136 - 145 mmol/L    Potassium 4.0 3.5 - 5.1 mmol/L    Chloride 107 97 - 108 mmol/L    CO2 25 21 - 32 mmol/L    Anion gap 6 5 - 15 mmol/L    Glucose 113 (H) 65 - 100 mg/dL    BUN 15 6 - 20 MG/DL    Creatinine 0.93 0.70 - 1.30 MG/DL    BUN/Creatinine ratio 16 12 - 20      GFR est AA >60 >60 ml/min/1.73m2    GFR est non-AA >60 >60 ml/min/1.73m2    Calcium 9.0 8.5 - 10.1 MG/DL   CBC WITH AUTOMATED DIFF    Collection Time: 11/20/20  2:38 AM   Result Value Ref Range    WBC 9.3 4.1 - 11.1 K/uL    RBC 5.31 4.10 - 5.70 M/uL    HGB 16.7 12.1 - 17.0 g/dL    HCT 48.4 36.6 - 50.3 %    MCV 91.1 80.0 - 99.0 FL    MCH 31.5 26.0 - 34.0 PG    MCHC 34.5 30.0 - 36.5 g/dL    RDW 12.3 11.5 - 14.5 %    PLATELET 601 832 - 010 K/uL MPV 11.3 8.9 - 12.9 FL    NRBC 0.0 0  WBC    ABSOLUTE NRBC 0.00 0.00 - 0.01 K/uL    NEUTROPHILS 80 (H) 32 - 75 %    LYMPHOCYTES 15 12 - 49 %    MONOCYTES 4 (L) 5 - 13 %    EOSINOPHILS 1 0 - 7 %    BASOPHILS 0 0 - 1 %    IMMATURE GRANULOCYTES 0 0.0 - 0.5 %    ABS. NEUTROPHILS 7.3 1.8 - 8.0 K/UL    ABS. LYMPHOCYTES 1.4 0.8 - 3.5 K/UL    ABS. MONOCYTES 0.4 0.0 - 1.0 K/UL    ABS. EOSINOPHILS 0.1 0.0 - 0.4 K/UL    ABS. BASOPHILS 0.0 0.0 - 0.1 K/UL    ABS. IMM.  GRANS. 0.0 0.00 - 0.04 K/UL    DF AUTOMATED            CHRONIC MEDICAL DIAGNOSES:  Problem List as of 11/20/2020 Date Reviewed: 11/18/2020          Codes Class Noted - Resolved    Dyslipidemia ICD-10-CM: E78.5  ICD-9-CM: 272.4  11/19/2020 - Present        Pacemaker ICD-10-CM: Z95.0  ICD-9-CM: V45.01  11/19/2020 - Present    Overview Signed 11/19/2020 12:41 PM by Braulio Frazier MD     11/19/2020 dual chamber Medtronic pacemaker             * (Principal) Bradycardia ICD-10-CM: R00.1  ICD-9-CM: 427.89  11/18/2020 - Present        Junctional bradycardia ICD-10-CM: R00.1  ICD-9-CM: 427.89  11/18/2020 - Present    Overview Signed 11/19/2020  7:31 AM by Leslie Sharpe NP     Added automatically from request for surgery [id number]             Type 2 diabetes mellitus without complication, without long-term current use of insulin ([facility] ICD-10-CM: E11.9  ICD-9-CM: 250.00  4/18/2019 - Present        Essential hypertension ICD-10-CM: I10  ICD-9-CM: 401.9  4/18/2019 - Present        Obesity, morbid ([facility] [address] ICD-10-CM: E66.01  ICD-9-CM: 278.01  3/2/2018 - Present        Sleep apnea in adult ICD-10-CM: G47.30  ICD-9-CM: 327.23  4/17/2017 - Present              Greater than 30 minutes were spent with the patient on counseling and coordination of care    Signed:   Pineda Smith MD  11/20/2020  1:12 PM   .

## 2020-11-20 NOTE — PROGRESS NOTES
[address] [facility] Adult  Hospitalist Group                                                                                          Hospitalist Progress Note  Cam Benjamin MD  Answering service: [phone] OR [phone] from in house phone        Date of Service:  2020  NAME:  Amber Hazel  :  1960  MRN:  [id number]      Admission Summary:   Amber Hazel is a 62 y.o. male past medical history of hypertension, DM II, and dyslipidemia who presents from short [facility] ED for bradycardia to the 30s. He initially presented with a chief complaint of dizziness, that was thought to be in inner ear abnormality. He was incidentally found to have bradycardia to the 30s. Cardiology was consulted, and commended transfer to [facility] for EP evaluation with poss. pacemaker placement. Interval history / Subjective:   Patient reports improvement in symptoms after pacemaker placement  Plans for Dc home in am tomorrow     Assessment & Plan:     #Bradycardia  EKG with junctional escape rhythm to 30's, hemodynamically stable. TSH 2.06,   troponin <0.05. No hx CCB, BB, or dig use.   EP consulted and placed pacemaker today-      #Hypertension- BP elevated  -resume home losartan 25mg daily     #DM II  -hold home metformin, resume home diabetes regimen on DC     HLD  -continue lipitor    Code status: Full  DVT prophylaxis: SCDs  Care Plan discussed with: Patient/Family  Anticipated Disposition: Home w/Family  Anticipated Discharge: 24 hours to 48 hours     Hospital Problems  Date Reviewed: 2020          Codes Class Noted POA    Dyslipidemia ICD-10-CM: E78.5  ICD-9-CM: 272.4  2020 Unknown        Pacemaker ICD-10-CM: Z95.0  ICD-9-CM: V45.01  2020 Unknown    Overview Signed 2020 12:41 PM by Deirdre Cha MD     2020 dual chamber Medtronic pacemaker             * (Principal) Bradycardia ICD-10-CM: R00.1  ICD-9-CM: 427.89  2020 Unknown        Junctional bradycardia ICD-10-CM: R00.1  ICD-9-CM: 427.89  11/18/2020     Overview Signed 11/19/2020  7:31 AM by Hamilton Chan, NP     Added automatically from request for surgery [id number]             Type 2 diabetes mellitus without complication, without long-term current use of insulin ([facility] [address] ICD-10-CM: E11.9  ICD-9-CM: 250.00  4/18/2019 Yes        Essential hypertension ICD-10-CM: I10  ICD-9-CM: 401.9  4/18/2019 Yes                Review of Systems:   A comprehensive review of systems was negative except for that written in the HPI. Vital Signs:    Last 24hrs VS reviewed since prior progress note. Most recent are:  Visit Vitals  BP (!) 167/84 (BP 1 Location: Right arm, BP Patient Position: At rest)   Pulse 70   Temp 97.9 °F (36.6 °C)   Resp 16   Ht 6' (1.829 m)   Wt 117.5 kg (259 lb)   SpO2 95%   BMI 35.13 kg/m²         Intake/Output Summary (Last 24 hours) at 11/19/2020 2315  Last data filed at 11/19/2020 1625  Gross per 24 hour   Intake 500 ml   Output    Net 500 ml        Physical Examination:     I had a face to face encounter with this patient and independently examined them on 11/19/2020 as outlined below:          Constitutional:  No acute distress, cooperative, pleasant    ENT:  Oral mucosa moist, . Resp:  CTA bilaterally. No wheezing/rhonchi/rales. No accessory muscle use   CV:  Regular rhythm, normal rate, no murmurs,  icepack on left upper chest    GI:  Soft, non distended, non tender. Musculoskeletal:  No edema, warm, 2+ pulses throughout    Neurologic:  Moves all extremities. AAOx3, CN II-XII reviewed     Psych:  Good insight, Not anxious nor agitated.        Data Review:    Review and/or order of clinical lab test  Review and/or order of tests in the radiology section of CPT  Review and/or order of tests in the medicine section of CPT      Labs:     Recent Labs     11/19/20 0331 11/18/20  1629   WBC 5.9 6.8   HGB 14.6 15.5   HCT 43.1 45.8    190     Recent Labs     11/19/20  0331 11/18/20  1629    138   K 3.8 5.0   * 105   CO2 26 25   BUN 14 15   CREA 0.85 0.77   * 96   CA 8.7 8.9   MG 2.2 2.3     Recent Labs     11/18/20  1629   ALT 46   AP 67   TBILI 0.8   TP 7.4   ALB 3.9   GLOB 3.5     No results for input(s): INR, PTP, APTT, INREXT in the last 72 hours. No results for input(s): FE, TIBC, PSAT, FERR in the last 72 hours. No results found for: FOL, RBCF   No results for input(s): PH, PCO2, PO2 in the last 72 hours.   Recent Labs     11/18/20  1629   TROIQ <0.05     Lab Results   Component Value Date/Time    Cholesterol, total 119 07/21/2020 08:55 AM    HDL Cholesterol 44 07/21/2020 08:55 AM    LDL, calculated 57 07/21/2020 08:55 AM    Triglyceride 88 07/21/2020 08:55 AM     No results found for: Hugo No  Lab Results   Component Value Date/Time    Color Yellow 04/17/2017 04:12 PM    Appearance Clear 04/17/2017 04:12 PM    pH (UA) 6.0 04/17/2017 04:12 PM    Ketone Negative 04/17/2017 04:12 PM    Bilirubin Negative 04/17/2017 04:12 PM    Nitrites Negative 04/17/2017 04:12 PM    Leukocyte Esterase Negative 04/17/2017 04:12 PM    Bacteria None seen 04/17/2017 04:12 PM    WBC None seen 04/17/2017 04:12 PM    RBC None seen 04/17/2017 04:12 PM         Medications Reviewed:     Current Facility-Administered Medications   Medication Dose Route Frequency    sodium chloride (NS) flush 5-40 mL  5-40 mL IntraVENous Q8H    sodium chloride (NS) flush 5-40 mL  5-40 mL IntraVENous PRN    HYDROcodone-acetaminophen (NORCO) 5-325 mg per tablet 1 Tab  1 Tab Oral Q4H PRN    sodium chloride (NS) flush 5-40 mL  5-40 mL IntraVENous Q8H    sodium chloride (NS) flush 5-40 mL  5-40 mL IntraVENous PRN    ceFAZolin (ANCEF) 2 g/20 mL in sterile water IV syringe  2 g IntraVENous Q8H    sodium chloride (NS) flush 5-40 mL  5-40 mL IntraVENous Q8H    sodium chloride (NS) flush 5-40 mL  5-40 mL IntraVENous PRN    acetaminophen (TYLENOL) tablet 650 mg  650 mg Oral Q6H PRN    Or    acetaminophen (TYLENOL) suppository 650 mg  650 mg Rectal Q6H PRN    losartan (COZAAR) tablet 25 mg  25 mg Oral QHS    atorvastatin (LIPITOR) tablet 10 mg  10 mg Oral QHS     ______________________________________________________________________  EXPECTED LENGTH OF STAY: - - -  ACTUAL LENGTH OF STAY:          0                 Naty Rodas MD

## 2020-11-20 NOTE — PROGRESS NOTES
Pacemaker check this am showed proper function   Soreness left chest   No hematoma  bp elevated  Discuss with Dr Diana Kaufman

## 2020-11-23 ENCOUNTER — PATIENT OUTREACH (OUTPATIENT)
Dept: CASE MANAGEMENT | Age: 60
End: 2020-11-23

## 2020-11-23 ENCOUNTER — VIRTUAL VISIT (OUTPATIENT)
Dept: PRIMARY CARE CLINIC | Age: 60
End: 2020-11-23
Payer: COMMERCIAL

## 2020-11-23 DIAGNOSIS — E78.5 HYPERLIPIDEMIA LDL GOAL <70: ICD-10-CM

## 2020-11-23 DIAGNOSIS — Z09 HOSPITAL DISCHARGE FOLLOW-UP: ICD-10-CM

## 2020-11-23 DIAGNOSIS — E11.9 TYPE 2 DIABETES MELLITUS WITHOUT COMPLICATION, WITHOUT LONG-TERM CURRENT USE OF INSULIN (HCC): ICD-10-CM

## 2020-11-23 DIAGNOSIS — R00.1 BRADYCARDIA: Primary | ICD-10-CM

## 2020-11-23 DIAGNOSIS — I10 ESSENTIAL HYPERTENSION: ICD-10-CM

## 2020-11-23 DIAGNOSIS — Z95.0 CARDIAC PACEMAKER: ICD-10-CM

## 2020-11-23 PROCEDURE — 1111F DSCHRG MED/CURRENT MED MERGE: CPT | Performed by: FAMILY MEDICINE

## 2020-11-23 PROCEDURE — 99213 OFFICE O/P EST LOW 20 MIN: CPT | Performed by: FAMILY MEDICINE

## 2020-11-23 NOTE — PROGRESS NOTES
Patient was admitted to [facility] on  and discharged on  for bradycardia. Outreach made within 2 business days of discharge: Yes    Top Discharge Challenges to be reviewed by the provider   Additional needs identified to be addressed with provider yes    Patient had pacemaker implanted on 2020 for bradycardia by Dr Bert Carter. Per discharge note:    Started on HCTZ for elevated BP and mildly elevated PaPressure on ECHO  Have your primary care MD monitor your kidney function, and electrolyte levels while on this medication and adjust dose and regimen    Patient discharged on PO Keflex for 5 days. Discussed COVID-19 related testing which was not done at this time. Test results were not done. Patient informed of results, if available? na   Method of communication with provider : chart routing       Advance Care Planning:   Does patient have an Advance Directive:  health care decision makers updated    Inpatient Readmission Risk score: 2  Was this a readmission? no     Patients top risk factors for readmission: none identified at this time      Care Transition Nurse (CTN) contacted the patient by telephone to perform post hospital discharge assessment. Verified name and  with patient as identifiers. Provided introduction to self, and explanation of the CTN role. CTN reviewed discharge instructions, medical action plan and red flags with patient who verbalized understanding. Patient given an opportunity to ask questions and does not have any further questions or concerns at this time. The patient agrees to contact the PCP office for questions related to their healthcare. Medication reconciliation was performed with patient, who verbalizes understanding of administration of home medications. Advised obtaining a 90-day supply of all daily and as-needed medications.    Referral to Pharm D needed: no     Home Health/Outpatient orders at discharge: none     Covid Risk Education    Patient has following risk factors of: diabetes. Education provided regarding infection prevention, and signs and symptoms of COVID-19 and when to seek medical attention with patient who verbalized understanding. Discussed exposure protocols and quarantine From CDC: Are you at higher risk for severe illness?  and given an opportunity for questions and concerns. The patient agrees to contact the COVID-19 hotline [phone] or PCP office for questions related to COVID-19. For more information on steps you can take to protect yourself, see CDC's How to Protect Yourself     Patient/family/caregiver given information for [employer] and agrees to enroll no  Patient's preferred e-mail: declines  Patient's preferred phone number: declines    Discussed follow-up appointments. If no appointment was previously scheduled, appointment scheduling offered: appts already made prior to discharge  [facility] follow up appointment(s):   Future Appointments   Date Time Provider Dorothy Leon   11/23/2020  4:00 PM Anthony Tavares MD [facility] BS AMB   12/4/2020  9:40 AM PACEMAKER3, BHUPINDER ORTIZ [facility]   12/4/2020 10:00 AM WOUND CHECKS, BHUPINDER ORTIZ [facility]     Non-[facility] follow up appointment(s): na  Plan for follow-up call in 10-14 days based on severity of symptoms and risk factors. CTN provided contact information for future needs. Goals Addressed                 This Visit's Progress     Prevent complications post hospitalization. 11/23/20  CTN spoke to patient to review discharge instructions/red flags to prevent readmission. Appts:    PCP Dr Tavares--patient is aware of virtual appt today 11/23 at 4pm  donte Chino--patient is aware of post pacer wound check at MD office on 12/4 at 9:40.  Patient reports he picked up post discharge medications and is taking as prescribed. Has no questions for CTN.  Patient denies dizziness, CP, SOB at time of call. Denies pain, s/sx infection at pacer site. Patient reports he is wearing sling.  Patient reports itching at dressing. Patient denies blisters or redness. He states he believes \"my hair growing back in\". CTN educated pt on S/sx adhesive allergy and instructed him to call Dr Ervin Number office if itching becomes worse or if he develops topical symptoms. Patient verbalizes understanding.  Patient states he understands post pacer d/c instructions, has no questions for CTN at this time.  Patient reports checking BP and HR, reports 114/82 and HR 72. Patient had no additional question or concerns at time of call for CTN.  Will follow up 10-14 days with patient---cierra

## 2020-11-24 NOTE — PROGRESS NOTES
Virtual Video Transitional Care Management Progress Note    Patient: Greg Young  : 1960  PCP: Bryanna Bella MD    Date of admission: 20  Date of discharge: 20    Patient was contacted by Transitional Care Management services within two days after his discharge: Yes. This encounter and supporting documentation was reviewed if available. Medication reconciliation was performed today (2020). Assessment/Plan:   Diagnoses and all orders for this visit:    1. Bradycardia     HR is well controlled. Doing well. Has an appointment with EP clinic on .      2. Cardiac pacemaker     Same as #1     Continue Keflex. 3. Hospital discharge follow-up  -     IN DISCHARGE MEDS RECONCILED W/ CURRENT OUTPATIENT MED LIST         4. Hyperlipidemia LDL goal <70      Continue Lipitor. 5. Essential hypertension      Continue current med. Will check BMP in one month. 6. Type 2 diabetes mellitus without complication, without long-term current use of insulin (HCC)     Restart Metformin. Follow-up and Dispositions    · Return in about 1 month (around 2020), or if symptoms worsen or fail to improve, for hypertension, Bring diabetic log book. .          Subjective:   Greg Young is a 62 y.o. male presenting today for follow-up after being discharged from [facility] [address] [place] [address].  The discharge summary was reviewed. The main problem requiring admission was dizziness, bradycardia. Complications during admission: none    Diagnosed with Bradycardia- he is S/P pacemaker. EP consulted and placed pacemaker 20. Patient has appointment with EP clinic for pacemaker follow up on 2020.     #Hypertension--resumed home losartan 25mg daily and added  HCTZ for elevated BP.     #DM II  -during admission Metformin was held. Patient thought that Metformin was stopped so he did not restart taking Metformin.      HLD  -Continued on lipitor    Interval history/Current status: doing well. Admitting symptoms have: significantly improved      Medications marked \"taking\" at this time:  Home Medications    Medication Sig Start Date End Date Taking? Authorizing Provider   losartan (COZAAR) 25 mg tablet Take 1 tablet by mouth once daily in the PM 11/20/20  Yes Nuris Monterroso MD   hydroCHLOROthiazide (HYDRODIURIL) 25 mg tablet Take 1 Tab by mouth daily. 11/21/20  Yes Nuris Monterroso MD   cephALEXin (KEFLEX) 500 mg capsule Take 1 Cap by mouth three (3) times daily for 5 days. Indications: post surgical prophylaxis 11/19/20 11/24/20 Yes Fortino Phillips NP   atorvastatin (LIPITOR) 10 mg tablet Take 1 Tab by mouth daily. 8/7/20  Yes Enmanuel Tavares MD   HYDROcodone-acetaminophen (NORCO) 5-325 mg per tablet Take 1 Tab by mouth every four (4) hours as needed for Pain for up to 14 days. Max Daily Amount: 6 Tabs. 11/20/20 12/4/20  Nuris Monterroso MD   ondansetron (Zofran ODT) 4 mg disintegrating tablet Take 1 Tab by mouth every six (6) hours as needed for Nausea or Nausea or Vomiting. 11/20/20   Nuris Monterroso MD   metFORMIN ER (GLUCOPHAGE XR) 500 mg tablet TAKE 1 TABLET BY MOUTH ONCE DAILY WITH DINNER 8/7/20   Enmanuel Tavares MD   glucose blood VI test strips (FREESTYLE LITE STRIPS) strip Check fasting blood sugar in AM X 1. 7/19/19   Enmanuel Tavares MD   OTHER Indications: Black Cherry Extract    Provider, Historical   Blood-Glucose Meter (FREESTYLE LITE METER) monitoring kit Check fasting blood sugar in AM X 1. 4/18/19   Enmanuel Tavares MD   lancets misc Check fasting blood sugar in AM X 1. 4/18/19   Enmanuel Tavares MD   MELATONIN PO Take  by mouth nightly as needed. Provider, Historical   aspirin delayed-release 81 mg tablet Take  by mouth daily. Provider, Historical   multivitamin (ONE A DAY) tablet Take 1 Tab by mouth daily. Provider, Historical        Review of Systems:  Negative except mild dizziness.        Patient Active Problem List   Diagnosis Code    Sleep apnea in adult G47.30    Obesity, morbid ([facility] [address] E66.01    Type 2 diabetes mellitus without complication, without long-term current use of insulin ([facility]) E11.9    Essential hypertension I10    Bradycardia R00.1    Dyslipidemia E78.5    Junctional bradycardia R00.1    Pacemaker Z95.0     Current Outpatient Medications   Medication Sig Dispense Refill    losartan (COZAAR) 25 mg tablet Take 1 tablet by mouth once daily in the PM 90 Tab 1    hydroCHLOROthiazide (HYDRODIURIL) 25 mg tablet Take 1 Tab by mouth daily. 90 Tab 2    cephALEXin (KEFLEX) 500 mg capsule Take 1 Cap by mouth three (3) times daily for 5 days. Indications: post surgical prophylaxis 15 Cap 0    atorvastatin (LIPITOR) 10 mg tablet Take 1 Tab by mouth daily. 90 Tab 1    HYDROcodone-acetaminophen (NORCO) 5-325 mg per tablet Take 1 Tab by mouth every four (4) hours as needed for Pain for up to 14 days. Max Daily Amount: 6 Tabs. 20 Tab 0    ondansetron (Zofran ODT) 4 mg disintegrating tablet Take 1 Tab by mouth every six (6) hours as needed for Nausea or Nausea or Vomiting. 30 Tab 0    metFORMIN ER (GLUCOPHAGE XR) 500 mg tablet TAKE 1 TABLET BY MOUTH ONCE DAILY WITH DINNER 90 Tab 1    glucose blood VI test strips (FREESTYLE LITE STRIPS) strip Check fasting blood sugar in AM X 1. 50 Strip 11    OTHER Indications: Black Cherry Extract      Blood-Glucose Meter (FREESTYLE LITE METER) monitoring kit Check fasting blood sugar in AM X 1. 1 Kit 0    lancets misc Check fasting blood sugar in AM X 1. 50 Each 11    MELATONIN PO Take  by mouth nightly as needed.  aspirin delayed-release 81 mg tablet Take  by mouth daily.  multivitamin (ONE A DAY) tablet Take 1 Tab by mouth daily.        Allergies   Allergen Reactions    Lisinopril Cough     Past Medical History:   Diagnosis Date    Hypertension      Past Surgical History:   Procedure Laterality Date    HX APPENDECTOMY      HX CERVICAL FUSION      HX HERNIA REPAIR      HX ORTHOPAEDIC      HX TONSILLECTOMY      HX TONSILLECTOMY      RI INS NEW/RPLCMT PRM PM W/TRANSV ELTRD ATRIAL&VENT  11/19/2020         [place] INS NEW/RPLCMT PRM PM W/TRANSV ELTRD ATRIAL&VENT Left 11/19/2020    INSERT PPM DUAL performed by Patrica Vitale MD at [address], [facility]     Social History     Tobacco Use    Smoking status: Never Smoker    Smokeless tobacco: Never Used    Tobacco comment: occ   Substance Use Topics    Alcohol use: Yes     Alcohol/week: 4.0 standard drinks     Types: 4 Cans of beer per week     Comment: on saturday           Objective:     Patient-Reported Vitals 11/23/2020   Patient-Reported Weight -   Patient-Reported Height -   Patient-Reported Pulse 72   Patient-Reported Systolic  662   Patient-Reported Diastolic 84      General: alert, cooperative, no distress   Mental  status: normal mood, behavior, speech, dress, motor activity, and thought processes, able to follow commands   HENT: NCAT   Neck: no visualized mass   Resp: no respiratory distress   Neuro: no gross deficits   Skin: no discoloration or lesions of concern on visible areas   Psychiatric: normal affect, consistent with stated mood, no evidence of hallucinations     Additional exam findings: site of the pacemaker looks dry and clean. We discussed the expected course, resolution and complications of the diagnosis(es) in detail. Medication risks, benefits, costs, interactions, and alternatives were discussed as indicated. I advised him to contact the office if his condition worsens, changes or fails to improve as anticipated. He expressed understanding with the diagnosis(es) and plan. Randolph Connors, who was evaluated through a synchronous (real-time) audio-video encounter and/or his healthcare decision maker, is aware that it is a billable service, with coverage as determined by his insurance carrier. He provided verbal consent to proceed: Yes, and patient identification was verified.  It was conducted pursuant to the emergency declaration under the [facility], [address] waiver authority and the [employer] Act. A caregiver was present when appropriate. Ability to conduct physical exam was limited. I was in the office. The patient was at home.       Augusto Jovel MD

## 2020-12-04 ENCOUNTER — CLINICAL SUPPORT (OUTPATIENT)
Dept: CARDIOLOGY CLINIC | Age: 60
End: 2020-12-04

## 2020-12-04 ENCOUNTER — CLINICAL SUPPORT (OUTPATIENT)
Dept: CARDIOLOGY CLINIC | Age: 60
End: 2020-12-04
Payer: COMMERCIAL

## 2020-12-04 DIAGNOSIS — Z95.0 CARDIAC PACEMAKER IN SITU: Primary | ICD-10-CM

## 2020-12-04 PROCEDURE — 93280 PM DEVICE PROGR EVAL DUAL: CPT | Performed by: INTERNAL MEDICINE

## 2020-12-04 NOTE — LETTER
NOTIFICATION RETURN TO WORK  
 
12/4/2020 1:36 PM 
 
Mr. Toño Taylor [facility] [address] To Whom It May Concern: 
 
Toño Taylor is currently under the care of [address] Mr. Samra Cuba had a dual chamber pacemaker placed on 11/19/20. He will return to work on 12/7/20 with left arm restrictions 4 weeks post implant: No heavy lifting or exercises with the left arm for 4 weeks post implant. This includes the following: Do not raise arm above the shoulder level. Do not use the affected arm to pull up or push up from a seated or laying down position. Do not all anyone else to pull on the af/fected arm. Mr. Samra Cuba' arm restrictions will be in place from 11/19/20 to 12/17/20. If there are questions or concerns please have the patient contact our office.  
 
 
 
Sincerely, 
 
Ed Pacheco MD

## 2020-12-04 NOTE — PROGRESS NOTES
Cardiac Electrophysiology Wound Check Note      Wound Check   Patient is here for wound check s/p dual chamber pacemaker. No fever, drainage   Physical Exam   Constitutional: well-developed and well-nourished. Skin: Left side pocket is healing without redness, drainage, hematoma. The wound is intact      ASSESSMENT and PLAN   The incision is healing without redness, drainage, hematoma. The patient has finished their anti-biotic and been compliant with arm restrictions. They will continue arms restrictions for 2 more weeks.   current treatment plan is effective, no change in therapy   Device check shows proper lead and generator functions    Follow-up Disposition:  Return 3 months I will check via device clinic or remote monitoring in the future

## 2020-12-11 ENCOUNTER — PATIENT OUTREACH (OUTPATIENT)
Dept: CASE MANAGEMENT | Age: 60
End: 2020-12-11

## 2020-12-11 NOTE — PROGRESS NOTES
Goals      Prevent complications post hospitalization. 11/23/20  CTN spoke to patient to review discharge instructions/red flags to prevent readmission. Appts:    PCP Dr Tavares--patient is aware of virtual appt today 11/23 at 4pm  Dr Zeny Lawson, cards--patient is aware of post pacer wound check at MD office on 12/4 at 9:40.  Patient reports he picked up post discharge medications and is taking as prescribed. Has no questions for CTN.  Patient denies dizziness, CP, SOB at time of call. Denies pain, s/sx infection at pacer site. Patient reports he is wearing sling.  Patient reports itching at dressing. Patient denies blisters or redness. He states he believes \"my hair growing back in\". CTN educated pt on S/sx adhesive allergy and instructed him to call Dr Yessenia Siddiqui office if itching becomes worse or if he develops topical symptoms. Patient verbalizes understanding.  Patient states he understands post pacer d/c instructions, has no questions for CTN at this time.  Patient reports checking BP and HR, reports 114/82 and HR 72. Patient had no additional question or concerns at time of call for CTN. Will follow up 10-14 days with patient---mkrw    12/11/20  CTN spoke to patient today to obtan update on rcovery and address any needs/concerns:    Patient reports he has been doing very well since discharge. Patient attended PCP SHANTEL on 11/23 and Dr Zeny Lawson on 12/4. Per Cards OV note:    Physical Exam   Constitutional: well-developed and well-nourished. Skin: Left side pocket is healing without redness, drainage, hematoma. The wound is intact        ASSESSMENT and PLAN   The incision is healing without redness, drainage, hematoma. The patient has finished their anti-biotic and been compliant with arm restrictions. They will continue arms restrictions for 2 more weeks.   current treatment plan is effective, no change in therapy   Device check shows proper lead and generator functions     Follow-up Disposition:  Return 3 months I will check via device clinic or remote monitoring in the future    Patient denies CP, SOB or dizziness since discharge. AHA diet discussed with patient, foods to avoid to promote good heart health. Patient had no additional needs or concerns for CTN to address at time of call. CTN will followup with patient in 7-10 days for updates. --cierra

## 2020-12-14 ENCOUNTER — OFFICE VISIT (OUTPATIENT)
Dept: PRIMARY CARE CLINIC | Age: 60
End: 2020-12-14
Payer: COMMERCIAL

## 2020-12-14 VITALS
DIASTOLIC BLOOD PRESSURE: 86 MMHG | HEART RATE: 85 BPM | WEIGHT: 255.8 LBS | SYSTOLIC BLOOD PRESSURE: 123 MMHG | RESPIRATION RATE: 16 BRPM | BODY MASS INDEX: 34.65 KG/M2 | TEMPERATURE: 98.7 F | HEIGHT: 72 IN | OXYGEN SATURATION: 97 %

## 2020-12-14 DIAGNOSIS — E78.5 HYPERLIPIDEMIA LDL GOAL <70: ICD-10-CM

## 2020-12-14 DIAGNOSIS — E11.9 TYPE 2 DIABETES MELLITUS WITHOUT COMPLICATION, WITHOUT LONG-TERM CURRENT USE OF INSULIN (HCC): Primary | ICD-10-CM

## 2020-12-14 DIAGNOSIS — I10 ESSENTIAL HYPERTENSION: ICD-10-CM

## 2020-12-14 LAB — HBA1C MFR BLD HPLC: 5.8 %

## 2020-12-14 PROCEDURE — 83036 HEMOGLOBIN GLYCOSYLATED A1C: CPT | Performed by: FAMILY MEDICINE

## 2020-12-14 PROCEDURE — 99214 OFFICE O/P EST MOD 30 MIN: CPT | Performed by: FAMILY MEDICINE

## 2020-12-14 RX ORDER — METFORMIN HYDROCHLORIDE 500 MG/1
TABLET, EXTENDED RELEASE ORAL
Qty: 90 TAB | Refills: 1 | Status: SHIPPED | OUTPATIENT
Start: 2020-12-14 | End: 2021-03-16 | Stop reason: SDUPTHER

## 2020-12-14 RX ORDER — ATORVASTATIN CALCIUM 10 MG/1
10 TABLET, FILM COATED ORAL DAILY
Qty: 90 TAB | Refills: 1 | Status: SHIPPED | OUTPATIENT
Start: 2020-12-14 | End: 2021-03-16 | Stop reason: SDUPTHER

## 2020-12-14 NOTE — PROGRESS NOTES
Chief Complaint   Patient presents with    Diabetes     Patient is fasting today . 1. Have you been to the ER, urgent care clinic since your last visit? Hospitalized since your last visit?no    2. Have you seen or consulted any other health care providers outside of the [address] [place] [address] since your last visit? Include any pap smears or colon screening.  no

## 2020-12-14 NOTE — PROGRESS NOTES
Subjective:     Chief Complaint   Patient presents with    Diabetes     Patient is fasting today . He  is a 62y.o. year old male who presents for  follow up for Diabetes, HLD and HTN follow up. Patient recently had a pacemaker . Patient reports that he has been feeling great. His energy level is much better. He had his f/u with cardiologist for pacemake check up.      DM-2: He is on Metformin 500 mg XL one tab/day . Last  A1c was 6.0. He has been compliant with med . No hypoglycemic events. FBS has been  80s.      HTN: He is on Losartan 25 mg.   Reports that at home BP has been ranging great. .      HLD: has been taking Lipitor as prescribed . Has been following weight watcher diet, lost 60 pounds in last 10 months.     Obesity: He has started following  Weight watchers since February. Lost more than 60 pounds in last 10 months.     Denies any chest pain, soa, cough, abdominal pain.     Lab Results   Component Value Date/Time    Hemoglobin A1c 6.0 (H) 07/21/2020 08:55 AM    Hemoglobin A1c (POC) 5.7 01/21/2020 08:31 AM     Lab Results   Component Value Date/Time    Cholesterol, total 119 07/21/2020 08:55 AM    HDL Cholesterol 44 07/21/2020 08:55 AM    LDL, calculated 57 07/21/2020 08:55 AM    VLDL, calculated 18 07/21/2020 08:55 AM    Triglyceride 88 07/21/2020 08:55 AM     Lab Results   Component Value Date/Time    Sodium 138 11/20/2020 02:38 AM    Potassium 4.0 11/20/2020 02:38 AM    Chloride 107 11/20/2020 02:38 AM    CO2 25 11/20/2020 02:38 AM    Anion gap 6 11/20/2020 02:38 AM    Glucose 113 (H) 11/20/2020 02:38 AM    BUN 15 11/20/2020 02:38 AM    Creatinine 0.93 11/20/2020 02:38 AM    BUN/Creatinine ratio 16 11/20/2020 02:38 AM    GFR est AA >60 11/20/2020 02:38 AM    GFR est non-AA >60 11/20/2020 02:38 AM    Calcium 9.0 11/20/2020 02:38 AM    Bilirubin, total 0.8 11/18/2020 04:29 PM    Alk.  phosphatase 67 11/18/2020 04:29 PM    Protein, total 7.4 11/18/2020 04:29 PM    Albumin 3.9 11/18/2020 04:29 PM    Globulin 3.5 11/18/2020 04:29 PM    A-G Ratio 1.1 11/18/2020 04:29 PM    ALT (SGPT) 46 11/18/2020 04:29 PM    AST (SGOT) 49 (H) 11/18/2020 04:29 PM             Pertinent items are noted in HPI. Objective:     Vitals:    12/14/20 0830   BP: 123/86   Pulse: 85   Resp: 16   Temp: 98.7 °F (37.1 °C)   TempSrc: Temporal   SpO2: 97%   Weight: 255 lb 12.8 oz (116 kg)   Height: 6' (1.829 m)       Physical Examination: General appearance - alert, well appearing, and in no distress, oriented to person, place, and time and overweight  Mental status - alert, oriented to person, place, and time, normal mood, behavior, speech, dress, motor activity, and thought processes  Chest - clear to auscultation, no wheezes, rales or rhonchi, symmetric air entry. Pacemaker incision site is clean and dry. Heart - normal rate, regular rhythm, normal S1, S2, no murmurs, rubs, clicks or gallops  Extremities - no pedal edema noted    Allergies   Allergen Reactions    Lisinopril Cough      Social History     Socioeconomic History    Marital status:      Spouse name: Not on file    Number of children: Not on file    Years of education: Not on file    Highest education level: Not on file   Tobacco Use    Smoking status: Never Smoker    Smokeless tobacco: Never Used    Tobacco comment: occ   Substance and Sexual Activity    Alcohol use:  Yes     Alcohol/week: 4.0 standard drinks     Types: 4 Cans of beer per week     Comment: on saturday    Drug use: No    Sexual activity: Yes   Other Topics Concern      Family History   Problem Relation Age of Onset    Lung Disease Mother     Diabetes Mother     Cancer Mother     Diabetes Father     Heart Disease Father     Lung Disease Father       Past Surgical History:   Procedure Laterality Date    HX APPENDECTOMY      HX CERVICAL FUSION      HX HERNIA REPAIR      HX ORTHOPAEDIC      HX TONSILLECTOMY      HX TONSILLECTOMY      [place] INS NEW/RPLCMT PRM PM W/TRANSV ELTRD ATRIAL&VENT  11/19/2020         [place] INS NEW/RPLCMT PRM PM W/TRANSV ELTRD ATRIAL&VENT Left 11/19/2020    INSERT PPM DUAL performed by Dylan Sahu MD at [address], [facility]      Past Medical History:   Diagnosis Date    Hypertension       Current Outpatient Medications   Medication Sig Dispense Refill    losartan (COZAAR) 25 mg tablet Take 1 tablet by mouth once daily in the PM 90 Tab 1    hydroCHLOROthiazide (HYDRODIURIL) 25 mg tablet Take 1 Tab by mouth daily. 90 Tab 2    ondansetron (Zofran ODT) 4 mg disintegrating tablet Take 1 Tab by mouth every six (6) hours as needed for Nausea or Nausea or Vomiting. 30 Tab 0    atorvastatin (LIPITOR) 10 mg tablet Take 1 Tab by mouth daily. 90 Tab 1    metFORMIN ER (GLUCOPHAGE XR) 500 mg tablet TAKE 1 TABLET BY MOUTH ONCE DAILY WITH DINNER 90 Tab 1    glucose blood VI test strips (FREESTYLE LITE STRIPS) strip Check fasting blood sugar in AM X 1. 50 Strip 11    OTHER Indications: Black Cherry Extract      Blood-Glucose Meter (FREESTYLE LITE METER) monitoring kit Check fasting blood sugar in AM X 1. 1 Kit 0    lancets misc Check fasting blood sugar in AM X 1. 50 Each 11    MELATONIN PO Take  by mouth nightly as needed.  aspirin delayed-release 81 mg tablet Take  by mouth daily.  multivitamin (ONE A DAY) tablet Take 1 Tab by mouth daily. Assessment/ Plan:   Diagnoses and all orders for this visit:    1. Type 2 diabetes mellitus without complication, without long-term current use of insulin ([facility])  -     AMB POC HEMOGLOBIN A1C  Last Point of Care HGB A1C  Hemoglobin A1c (POC)   Date Value Ref Range Status   12/14/2020 5.8 % Final            Well controlled. Will consider stopping metformin if A1c is still in this range in next appointment. Continue work on diet and exercise. -     metFORMIN ER (GLUCOPHAGE XR) 500 mg tablet; TAKE 1 TABLET BY MOUTH ONCE DAILY WITH DINNER  -     atorvastatin (LIPITOR) 10 mg tablet;  Take 1 Tab by mouth daily.    2. Hyperlipidemia LDL goal <70  -  Well controlled. Continue   atorvastatin (LIPITOR) 10 mg tablet; Take 1 Tab by mouth daily. Lab Results   Component Value Date/Time    LDL, calculated 57 07/21/2020 08:55 AM       3. Essential hypertension     Well controlled with Losartan and HCTZ. Medication risks/benefits/costs/interactions/alternatives discussed with patient. Advised patient to call back or return to office if symptoms worsen/change/persist. If patient cannot reach us or should anything more severe/urgent arise he/she should proceed directly to the nearest emergency department. Discussed expected course/resolution/complications of diagnosis in detail with patient. Patient given a written after visit summary which includes her diagnoses, current medications and vitals. Patient expressed understanding with the diagnosis and plan. Follow-up and Dispositions    · Return in about 3 months (around 3/14/2021), or if symptoms worsen or fail to improve, for Bring diabetic log book. , Cholesterol, blood pressure. Lizzie Groves

## 2020-12-21 ENCOUNTER — PATIENT OUTREACH (OUTPATIENT)
Dept: CASE MANAGEMENT | Age: 60
End: 2020-12-21

## 2020-12-21 NOTE — PROGRESS NOTES
Patient has graduated from the Transitions of Care Coordination  program on 12/21/2020. Patient/family has the ability to self-manage at this time Care management goals have been completed. Patient was not referred to the [facility] team for further management. Goals Addressed                 This Visit's Progress     Prevent complications post hospitalization. 11/23/20  CTN spoke to patient to review discharge instructions/red flags to prevent readmission. Appts:    PCP Dr Tavares--patient is aware of virtual appt today 11/23 at 4pm  Dr Solo Noel, cards--patient is aware of post pacer wound check at MD office on 12/4 at 9:40.  Patient reports he picked up post discharge medications and is taking as prescribed. Has no questions for CTN.  Patient denies dizziness, CP, SOB at time of call. Denies pain, s/sx infection at pacer site. Patient reports he is wearing sling.  Patient reports itching at dressing. Patient denies blisters or redness. He states he believes \"my hair growing back in\". CTN educated pt on S/sx adhesive allergy and instructed him to call Dr Reva Sagastume office if itching becomes worse or if he develops topical symptoms. Patient verbalizes understanding.  Patient states he understands post pacer d/c instructions, has no questions for CTN at this time.  Patient reports checking BP and HR, reports 114/82 and HR 72. Patient had no additional question or concerns at time of call for CTN. Will follow up 10-14 days with patient---mkrw    12/11/20  CTN spoke to patient today to obtan update on rcovery and address any needs/concerns:    Patient reports he has been doing very well since discharge. Patient attended PCP SHANTEL on 11/23 and Dr Solo Noel on 12/4. Per Cards OV note:    Physical Exam   Constitutional: well-developed and well-nourished. Skin: Left side pocket is healing without redness, drainage, hematoma.  The wound is intact        ASSESSMENT and PLAN   The incision is healing without redness, drainage, hematoma. The patient has finished their anti-biotic and been compliant with arm restrictions. They will continue arms restrictions for 2 more weeks. current treatment plan is effective, no change in therapy   Device check shows proper lead and generator functions     Follow-up Disposition:  Return 3 months I will check via device clinic or remote monitoring in the future    Patient denies CP, SOB or dizziness since discharge. AHA diet discussed with patient, foods to avoid to promote good heart health. Patient had no additional needs or concerns for CTN to address at time of call. CTN will followup with patient in 7-10 days for updates. --mkrw     12/21/20  CTN unable to reach patient on phone, LM on  requesting return call. Per chart review, pt attended PCP followup on 12/14, no concerns notes. Pt has 3 month follow up. No evidence of readmission during 30 day SHANTEL. CTN will close SHANTEL episode---mkrw              Patient has Care Transition Nurse's contact information for any further questions, concerns, or needs.   Patients upcoming visits:    Future Appointments   Date Time Provider Dorothy Leon   3/11/2021  9:00 AM [id number] AMB   3/11/2021  9:20 AM MD VIRIDIANA Avila BS AMB

## 2021-01-26 ENCOUNTER — VIRTUAL VISIT (OUTPATIENT)
Dept: SLEEP MEDICINE | Age: 61
End: 2021-01-26
Payer: COMMERCIAL

## 2021-01-26 DIAGNOSIS — G47.33 OSA (OBSTRUCTIVE SLEEP APNEA): Primary | ICD-10-CM

## 2021-01-26 DIAGNOSIS — I10 ESSENTIAL HYPERTENSION: ICD-10-CM

## 2021-01-26 PROCEDURE — 99213 OFFICE O/P EST LOW 20 MIN: CPT | Performed by: INTERNAL MEDICINE

## 2021-01-26 NOTE — PATIENT INSTRUCTIONS
[address] [place]. [address] [facility], [address] Tel.  [phone] Fax. [address] [address] [facility] [facility] [address] [place], [address] Tel.  [phone] Fax. [phone] [address]cha Gorman Tel.  [phone] Fax. [phone] Learning About CPAP for Sleep Apnea What is CPAP? CPAP is a small machine that you use at home every night while you sleep. It increases air pressure in your throat to keep your airway open. When you have sleep apnea, this can help you sleep better so you feel much better. CPAP stands for \"continuous positive airway pressure. \" The CPAP machine will have one of the following: · A mask that covers your nose and mouth · Prongs that fit into your nose · A mask that covers your nose only, the most common type. This type is called NCPAP. The N stands for \"nasal.\" Why is it done? CPAP is usually the best treatment for obstructive sleep apnea. It is the first treatment choice and the most widely used. Your doctor may suggest CPAP if you have: · Moderate to severe sleep apnea. · Sleep apnea and coronary artery disease (CAD) or heart failure. How does it help? · CPAP can help you have more normal sleep, so you feel less sleepy and more alert during the daytime. · CPAP may help keep heart failure or other heart problems from getting worse. · NCPAP may help lower your blood pressure. · If you use CPAP, your bed partner may also sleep better because you are not snoring or restless. What are the side effects? Some people who use CPAP have: · A dry or stuffy nose and a sore throat. · Irritated skin on the face. · Sore eyes. · Bloating. If you have any of these problems, work with your doctor to fix them. Here are some things you can try: · Be sure the mask or nasal prongs fit well. · See if your doctor can adjust the pressure of your CPAP. · If your nose is dry, try a humidifier. · If your nose is runny or stuffy, try decongestant medicine or a steroid nasal spray. If these things do not help, you might try a different type of machine. Some machines have air pressure that adjusts on its own. Others have air pressures that are different when you breathe in than when you breathe out. This may reduce discomfort caused by too much pressure in your nose. Where can you learn more? Go to InvestGlass.be Enter B160 in the search box to learn more about \"Learning About CPAP for Sleep Apnea. \"  
© 8413-1257 Healthwise, [employer]. Care instructions adapted under license by Sonya Thomas (which disclaims liability or warranty for this information). This care instruction is for use with your licensed healthcare professional. If you have questions about a medical condition or this instruction, always ask your healthcare professional. [address] any warranty or liability for your use of this information. Content Version: 0.0.67331; Last Revised: January 11, 2010 PROPER SLEEP HYGIENE What to avoid · Do not have drinks with caffeine, such as coffee or black tea, for 8 hours before bed. · Do not smoke or use other types of tobacco near bedtime. Nicotine is a stimulant and can keep you awake. · Avoid drinking alcohol late in the evening, because it can cause you to wake in the middle of the night. · Do not eat a big meal close to bedtime. If you are hungry, eat a light snack. · Do not drink a lot of water close to bedtime, because the need to urinate may wake you up during the night. · Do not read or watch TV in bed. Use the bed only for sleeping and sexual activity. What to try · Go to bed at the same time every night, and wake up at the same time every morning. Do not take naps during the day. · Keep your bedroom quiet, dark, and cool. · Get regular exercise, but not within 3 to 4 hours of your bedtime. [facility] · Sleep on a comfortable pillow and mattress. · If watching the clock makes you anxious, turn it facing away from you so you cannot see the time. · If you worry when you lie down, start a worry book. Well before bedtime, write down your worries, and then set the book and your concerns aside. · Try meditation or other relaxation techniques before you go to bed. · If you cannot fall asleep, get up and go to another room until you feel sleepy. Do something relaxing. Repeat your bedtime routine before you go to bed again. · Make your house quiet and calm about an hour before bedtime. Turn down the lights, turn off the TV, log off the computer, and turn down the volume on music. This can help you relax after a busy day. Drowsy Driving: The [employer] cites drowsiness as a causing factor in more than 711,419 police reported crashes annually, resulting in 76,000 injuries and 1,500 deaths. Other surveys suggest 55% of people polled have driven while drowsy in the past year, 23% had fallen asleep but not crashed, 3% crashed, and 2% had and accident due to drowsy driving. Who is at risk? Young Drivers: One study of drowsy driving accidents states that 55% of the drivers were under 25 years. Of those, 75% were male. Shift Workers and Travelers: People who work overnight or travel across time zones frequently are at higher risk of experiencing Circadian Rhythm Disorders. They are trying to work and function when their body is programed to sleep. Sleep Deprived: Lack of sleep has a serious impact on your ability to pay attention or focus on a task. Consistently getting less than the average of 8 hours your body needs creates partial or cumulative sleep deprivation. Untreated Sleep Disorders: Sleep Apnea, Narcolepsy, R.L.S., and other sleep disorders (untreated) prevent a person from getting enough restful sleep. This leads to excessive daytime sleepiness and increases the risk for drowsy driving accidents by up to 7 times. Medications / Alcohol: Even over the counter medications can cause drowsiness. Medications that impair a drivers attention should have a warning label. Alcohol naturally makes you sleepy and on its own can cause accidents. Combined with excessive drowsiness its effects are amplified. Signs of Drowsy Driving: * You don't remember driving the last few miles * You may drift out of your jaki * You are unable to focus and your thoughts wander * You may yawn more often than normal 
 * You have difficulty keeping your eyes open / nodding off * Missing traffic signs, speeding, or tailgating Prevention-  
Good sleep hygiene, lifestyle and behavioral choices have the most impact on drowsy driving. There is no substitute for sleep and the average person requires 8 hours nightly. If you find yourself driving drowsy, stop and sleep. Consider the sleep hygiene tips provided during your visit as well. Medication Refill Policy: Refills for all medications require 1 week advance notice. Please have your pharmacy fax a refill request. We are unable to fax, or call in \"controled substance\" medications and you will need to pick these prescriptions up from our office. SecondMic Activation Thank you for requesting access to Second[employer]. Please follow the instructions below to securely access and download your online medical record. SecondMic allows you to send messages to your doctor, view your test results, renew your prescriptions, schedule appointments, and more. How Do I Sign Up? 1. In your internet browser, go to [email or web address]. 2. Click on the First Time User? Click Here link in the Sign In box. You will see the New Member Sign Up page. 3. Enter your Quu Access Code exactly as it appears below. You will not need to use this code after youve completed the sign-up process. If you do not sign up before the expiration date, you must request a new code. MyChart Access Code: Activation code not generated Current Quu Status: Active (This is the date your MyChart access code will ) 4. Enter the last four digits of your Social Security Number (xxxx) and Date of Birth (mm/dd/yyyy) as indicated and click Submit. You will be taken to the next sign-up page. 5. Create a Storybirdt ID. This will be your Quu login ID and cannot be changed, so think of one that is secure and easy to remember. 6. Create a Quu password. You can change your password at any time. 7. Enter your Password Reset Question and Answer. This can be used at a later time if you forget your password. 8. Enter your e-mail address. You will receive e-mail notification when new information is available in [address] 9. Click Sign Up. You can now view and download portions of your medical record. 10. Click the Download Summary menu link to download a portable copy of your medical information. Additional Information If you have questions, please call [phone]. Remember, Quu is NOT to be used for urgent needs. For medical emergencies, dial 911.

## 2021-01-26 NOTE — PROGRESS NOTES
[address] [place]. [place], [address]  Tel.  [phone]  Fax. [address] [facility] [address]  [place], [address] [facility]  Tel.  [phone]  Fax. [phone] [address] Concha Sommers   Tel.  [phone]  Fax. [phone]       Salvatore Hutchins (: 1960) is a 62 y.o. male, established patient, seen for positive airway pressure follow-up and evaluation of the following chief complaint(s):   Sleep Problem (1st adherence, send link to: [phone])       Salvatore Hutchins was last seen by me on 20, prior notes reviewed in detail. HSAT sleep test 20 showed AHI of 77.2/hr with a lowest SaO2 of 82%, duration of SaO2 < 88% 8.6 min. ASSESSMENT/PLAN:    ICD-10-CM ICD-9-CM    1. ANIL (obstructive sleep apnea)  G47.33 327.23    2. Essential hypertension  I10 401.9    3. BMI 33.0-33.9,adult  Z68.33 V85.33        On Respironics :  DreamStation Auto-CPAP. Set up date  20. Settings:  Min EPAP: 10 cmH2O  Max EPAP: 20 cmH2O    He is adherent with PAP therapy and PAP continues to benefit patient and remains necessary for control of his sleep apnea. 1. Sleep Apnea -   * Change pressure setting to                        Continue on current pressures    * Supplies for his device were ordered as noted below:    * He is familiar with the telephone monitoring application, is willing to track therapy and agrees to notify use if AHI is >5 per hour. * Counseling was provided regarding the importance of regular PAP use with emphasis on ensuring sufficient total sleep time, proper sleep hygiene, and safe driving. * Re-enforced proper and regular cleaning for the device. * He was asked to contact our office for any problems regarding PAP therapy. 2. Recommended a dedicated weight loss program through appropriate diet and exercise regimen as significant weight reduction has been shown to reduce severity of obstructive sleep apnea. SUBJECTIVE/OBJECTIVE:    He  is seen today for follow up on PAP device and reports no problems using the device. The following concerns identified:    Drowsiness no Problems exhaling no   Snoring no Forget to put on no   Mask Comfortable yes Can't fall asleep no   Dry Mouth no Mask falls off no   Air Leaking no Frequent awakenings no       He admits that his sleep has improved on PAP therapy using nasal mask and non-heated tubing. Review of device download indicated:    Percent of Days with Usage >= 4 hours 100 %  Average Usage (Days Used) 7 hour 10 minutes    Average Device Pressure < =90% of Time  12 cmH2O    Average Time in Large Leak Per Day  8 seconds    Average AHI: 2.2 /hr which reflects significantly improved sleep breathing condition. Capac Sleepiness Score: 12 and Modified F.O.S.Q. Score Total / 2: 19.5 which reflects improved sleep quality over therapy time. Sleep Review of Systems: notable for Negative difficulty falling asleep; Negative awakenings at night; Negative early morning headaches; Negative memory problems; Negative concentration issues; Negative chest pain; Negative shortness of breath; Negative significant joint pain at night; Negative significant muscle pain at night; Negative rashes or itching; Negative heartburn; Negative significant mood issues; No afternoon naps per week    Vitals reported by patient     Patient-Reported Vitals 1/26/2021   Patient-Reported Weight 250 lb   Patient-Reported Height -   Patient-Reported Pulse -   Patient-Reported Systolic  -   Patient-Reported Diastolic -      Calculated BMI 33 kg/m2    Physical Exam completed by visual and auditory observation of patient with verbal input from patient.     General:   Alert, oriented, not in acute distress   Eyes:  Anicteric Sclerae; no obvious strabismus   Nose:  No obvious nasal septum deviation    Neck:   Midline trachea, no visible mass   Chest/Lungs:  Respiratory effort normal, no visualized signs of difficulty breathing or respiratory distress   CVS:  No JVD   Extremities:  No obvious rashes noted on face, neck, or hands   Neuro:  No facial asymmetry, no focal deficits; no obvious tremor    Psych:  Normal affect,  normal countenance     Gerry Crowley is being evaluated by a Virtual Visit (video visit) encounter to address concerns as mentioned above. A caregiver was present when appropriate. Due to this being a TeleHealth encounter (During [facility]- public health emergency), evaluation of the following organ systems was limited: Vitals/Constitutional/EENT/Resp/CV/GI//MS/Neuro/Skin/Heme-Lymph-Imm. Pursuant to the emergency declaration under the [address] [place] [address] and the [employer] and [employer] Act, this Virtual Visit was conducted with patient's (and/or legal guardian's) consent, to reduce the patient's risk of exposure to COVID-19 and provide necessary medical care. Patient identification was verified at the start of the visit: YES using name and date of birth. Patient's phone number [phone] (home) [phone] (work) was confirmed for accuracy. He gives permission for messages regarding results and appointments to be left at that number. Services were provided through a video synchronous discussion virtually to substitute for in-person clinic visit. I was in the office while conducting this encounter, patient located at their home or alternate location of their choice. An electronic signature was used to authenticate this note. Morro Miguel MD, FAASM  Electronically signed.  01/26/21

## 2021-03-11 ENCOUNTER — CLINICAL SUPPORT (OUTPATIENT)
Dept: CARDIOLOGY CLINIC | Age: 61
End: 2021-03-11

## 2021-03-11 ENCOUNTER — OFFICE VISIT (OUTPATIENT)
Dept: CARDIOLOGY CLINIC | Age: 61
End: 2021-03-11
Payer: COMMERCIAL

## 2021-03-11 VITALS
HEIGHT: 72 IN | BODY MASS INDEX: 34.67 KG/M2 | WEIGHT: 256 LBS | RESPIRATION RATE: 16 BRPM | HEART RATE: 65 BPM | DIASTOLIC BLOOD PRESSURE: 76 MMHG | SYSTOLIC BLOOD PRESSURE: 118 MMHG

## 2021-03-11 DIAGNOSIS — Z95.0 CARDIAC PACEMAKER IN SITU: Primary | ICD-10-CM

## 2021-03-11 DIAGNOSIS — I10 ESSENTIAL HYPERTENSION: ICD-10-CM

## 2021-03-11 DIAGNOSIS — G47.30 SLEEP APNEA IN ADULT: ICD-10-CM

## 2021-03-11 DIAGNOSIS — I47.1 PAT (PAROXYSMAL ATRIAL TACHYCARDIA) (HCC): ICD-10-CM

## 2021-03-11 DIAGNOSIS — R00.1 JUNCTIONAL BRADYCARDIA: ICD-10-CM

## 2021-03-11 PROCEDURE — 93280 PM DEVICE PROGR EVAL DUAL: CPT | Performed by: INTERNAL MEDICINE

## 2021-03-11 PROCEDURE — 99214 OFFICE O/P EST MOD 30 MIN: CPT | Performed by: INTERNAL MEDICINE

## 2021-03-11 RX ORDER — MELATONIN
1000 DAILY
COMMUNITY

## 2021-03-11 NOTE — PROGRESS NOTES
Cardiac Electrophysiology OFFICE Note     Subjective:      Abena Moulton is a 62 y.o. patient who is seen for evaluation of  Pacemaker  He is feeling better, wants to exercise  Wife does not want him to split woods    He presented to the ER on 11/18/2020, sent from PCP office. Reported dizziness since 11/13/2020. PCP noted bradycardia in the 30s.    ECG in the ER showed junctional bradycardia 32 bpm with RBBB (new since last ECG in 0/2013).    Troponin negative. TSH WNL. Other labs essentially unremarkable.        Consistently with junctional bradycardia 30s on telemetry.      He denies chest pain, palpitations, SOB, PND, orthopnea, syncope, or edema.       Problem List  Date Reviewed: 3/11/2021          Codes Class Noted    Dyslipidemia ICD-10-CM: E78.5  ICD-9-CM: 272.4  11/19/2020        Pacemaker ICD-10-CM: Z95.0  ICD-9-CM: V45.01  11/19/2020    Overview Signed 11/19/2020 12:41 PM by Carlyle Maddox MD     11/19/2020 dual chamber Medtronic pacemaker             Bradycardia ICD-10-CM: R00.1  ICD-9-CM: 427.89  11/18/2020        Junctional bradycardia ICD-10-CM: R00.1  ICD-9-CM: 427.89  11/18/2020    Overview Signed 11/19/2020  7:31 AM by Humphrey Horton NP     Added automatically from request for surgery [id number]             Type 2 diabetes mellitus without complication, without long-term current use of insulin ([facility] [address] ICD-10-CM: E11.9  ICD-9-CM: 250.00  4/18/2019        Essential hypertension ICD-10-CM: I10  ICD-9-CM: 401.9  4/18/2019        Obesity, morbid ([facility] [address] ICD-10-CM: E66.01  ICD-9-CM: 278.01  3/2/2018        Sleep apnea in adult ICD-10-CM: G47.30  ICD-9-CM: 327.23  4/17/2017              Current Outpatient Medications   Medication Sig Dispense Refill    cholecalciferol (Vitamin D3) (1000 Units /25 mcg) tablet Take 1,000 Units by mouth daily.       metFORMIN ER (GLUCOPHAGE XR) 500 mg tablet TAKE 1 TABLET BY MOUTH ONCE DAILY WITH DINNER 90 Tab 1    atorvastatin (LIPITOR) 10 mg tablet Take 1 Tab by mouth daily. 90 Tab 1    losartan (COZAAR) 25 mg tablet Take 1 tablet by mouth once daily in the PM 90 Tab 1    hydroCHLOROthiazide (HYDRODIURIL) 25 mg tablet Take 1 Tab by mouth daily. 90 Tab 2    glucose blood VI test strips (FREESTYLE LITE STRIPS) strip Check fasting blood sugar in AM X 1. 50 Strip 11    OTHER Indications: Black Cherry Extract      Blood-Glucose Meter (FREESTYLE LITE METER) monitoring kit Check fasting blood sugar in AM X 1. 1 Kit 0    lancets misc Check fasting blood sugar in AM X 1. 50 Each 11    multivitamin (ONE A DAY) tablet Take 1 Tab by mouth daily. Allergies   Allergen Reactions    Lisinopril Cough     Past Medical History:   Diagnosis Date    Hypertension      Past Surgical History:   Procedure Laterality Date    HX APPENDECTOMY      HX CERVICAL FUSION      HX HERNIA REPAIR      HX ORTHOPAEDIC      HX TONSILLECTOMY      HX TONSILLECTOMY      DC INS NEW/RPLCMT PRM PM W/TRANSV ELTRD ATRIAL&VENT  11/19/2020         DC INS NEW/RPLCMT PRM PM W/TRANSV ELTRD ATRIAL&VENT Left 11/19/2020    INSERT PPM DUAL performed by Joselin Castle MD at [address] [address] [facility]     Family History   Problem Relation Age of Onset    Lung Disease Mother     Diabetes Mother     Cancer Mother     Diabetes Father     Heart Disease Father     Lung Disease Father      Social History     Tobacco Use    Smoking status: Never Smoker    Smokeless tobacco: Never Used    Tobacco comment: occ   Substance Use Topics    Alcohol use: Yes     Alcohol/week: 4.0 standard drinks     Types: 4 Cans of beer per week     Comment: on saturday        Review of Systems: all other systems negative  Constitutional: Negative for fever, chills, weight loss, malaise/fatigue. HEENT: Negative for nosebleeds, vision changes. Respiratory: Negative for cough, hemoptysis  Cardiovascular: Negative for chest pain, palpitations, orthopnea, claudication, leg swelling, syncope, and PND.    Gastrointestinal: Negative for nausea, vomiting, diarrhea, blood in stool and melena. Genitourinary: Negative for dysuria, and hematuria. Musculoskeletal: Negative for myalgias, arthralgia. Skin: Negative for rash. Heme: Does not bleed or bruise easily. Neurological: Negative for speech change and focal weakness     Objective:     Visit Vitals  /76 (BP 1 Location: Left upper arm, BP Patient Position: Sitting)   Pulse 65   Resp 16   Ht 6' (1.829 m)   Wt 256 lb (116.1 kg)   BMI 34.72 kg/m²      Physical Exam:     Constitutional: well-developed and well-nourished. No respiratory distress. Head: Normocephalic and atraumatic. Eyes: Pupils are equal, round  ENT: hearing normal  Neck: supple. No JVD present. Cardiovascular: Normal rate, regular rhythm. Exam reveals no gallop and no friction rub. No murmur heard. Pulmonary/Chest: Effort normal and breath sounds normal. No wheezes. Abdominal: Soft, no tenderness. Musculoskeletal: no edema. Neurological: alert, oriented. Skin: left chest pacemaker healed  Psychiatric: normal mood and affect. Behavior is normal. Judgment and thought content normal.        Assessment/Plan:        ICD-10-CM ICD-9-CM    1. Cardiac pacemaker in situ  Z95.0 V45.01    2. Essential hypertension  I10 401.9    3. Sleep apnea in adult  G47.30 327.23    4.  PAT (paroxysmal atrial tachycardia) ([facility])  I47.1 427.0    5. Junctional bradycardia  R00.1 427.89      75% RA pacing and 2% RA pacing  Battery 11 years left  He sleeps better  May exercise  He likes to play golf again  New PAT 26 seconds 3/4/21    Blood pressure normal    New atrial tach but short and asymptomatic so no treatment needed     May exercise    Future Appointments   Date Time Provider Dorothy Leon   3/16/2021  8:45 AM MD DRAKE Sanchez BS AMB   7/28/2021  3:45 PM REMOTE1, BHUPINDER KEMP BS AMB   11/1/2021 11:00 AM REMOTE1, BHUPINDER KEMP BS AMB   1/5/2022 11:00 AM REMOTE1, BHUPINDER ORTIZ AMB   4/7/2022  9:20 AM Eduardo KEMP BS AMB   4/7/2022  9:40 AM MD VIRIDIANA Interiano BS AMB           Thank you for involving me in this patient's care and please call with further concerns or questions. Katerine Scott M.D.   Electrophysiology/Cardiology  [facility]  [address] [place] [address]                             [phone]

## 2021-03-16 ENCOUNTER — OFFICE VISIT (OUTPATIENT)
Dept: PRIMARY CARE CLINIC | Age: 61
End: 2021-03-16
Payer: COMMERCIAL

## 2021-03-16 VITALS
HEART RATE: 75 BPM | WEIGHT: 261 LBS | SYSTOLIC BLOOD PRESSURE: 107 MMHG | HEIGHT: 72 IN | TEMPERATURE: 97 F | BODY MASS INDEX: 35.35 KG/M2 | OXYGEN SATURATION: 97 % | DIASTOLIC BLOOD PRESSURE: 69 MMHG | RESPIRATION RATE: 16 BRPM

## 2021-03-16 DIAGNOSIS — E11.9 TYPE 2 DIABETES MELLITUS WITHOUT COMPLICATION, WITHOUT LONG-TERM CURRENT USE OF INSULIN (HCC): Primary | ICD-10-CM

## 2021-03-16 DIAGNOSIS — I10 ESSENTIAL HYPERTENSION: ICD-10-CM

## 2021-03-16 DIAGNOSIS — E78.5 HYPERLIPIDEMIA LDL GOAL <70: ICD-10-CM

## 2021-03-16 DIAGNOSIS — Z95.0 CARDIAC PACEMAKER: ICD-10-CM

## 2021-03-16 LAB — HBA1C MFR BLD HPLC: 5.4 %

## 2021-03-16 PROCEDURE — 99214 OFFICE O/P EST MOD 30 MIN: CPT | Performed by: FAMILY MEDICINE

## 2021-03-16 PROCEDURE — 83036 HEMOGLOBIN GLYCOSYLATED A1C: CPT | Performed by: FAMILY MEDICINE

## 2021-03-16 RX ORDER — METFORMIN HYDROCHLORIDE 500 MG/1
TABLET, EXTENDED RELEASE ORAL
Qty: 90 TAB | Refills: 1 | Status: SHIPPED | OUTPATIENT
Start: 2021-03-16 | End: 2022-02-06

## 2021-03-16 RX ORDER — ATORVASTATIN CALCIUM 10 MG/1
10 TABLET, FILM COATED ORAL DAILY
Qty: 90 TAB | Refills: 1 | Status: SHIPPED | OUTPATIENT
Start: 2021-03-16 | End: 2021-06-08 | Stop reason: SDUPTHER

## 2021-03-16 NOTE — PROGRESS NOTES
Chief Complaint   Patient presents with    Diabetes     and A1c     1. Have you been to the ER, urgent care clinic since your last visit? Hospitalized since your last visit? No    2. Have you seen or consulted any other health care providers outside of the [address] [place] [address] since your last visit? Include any pap smears or colon screening.  No

## 2021-03-16 NOTE — PROGRESS NOTES
Subjective:     Chief Complaint   Patient presents with    Diabetes     and A1c        He  is a 62y.o. year old male follow up for Diabetes, HLD and HTN follow up.       S/P pacemaker. He has been f/u with cardiologist regularly.     DM-2: He is on Metformin 500 mg XL one tab/day . Last  A1c was 5.8. He has been compliant with med . No hypoglycemic events.     HTN: He is on Losartan 25 mg.   Reports that at home BP has been ranging great. .      HLD: has been taking Lipitor as prescribed . Has been following weight watcher diet, lost 60 pounds in last 10 months.     Obesity: He has started following Weight watchers since February, 2020.      Denies any chest pain, soa, cough, abdominal pain. Pertinent items are noted in HPI. Objective:     Vitals:    03/16/21 0841   BP: 107/69   Pulse: 75   Resp: 16   Temp: 97 °F (36.1 °C)   TempSrc: Temporal   SpO2: 97%   Weight: 261 lb (118.4 kg)   Height: 6' (1.829 m)       Physical Examination: General appearance - alert, well appearing, and in no distress, oriented to person, place, and time and overweight  Mental status - alert, oriented to person, place, and time, normal mood, behavior, speech, dress, motor activity, and thought processes  Chest - clear to auscultation, no wheezes, rales or rhonchi, symmetric air entry  Heart - normal rate, regular rhythm, normal S1, S2, no murmurs, rubs, clicks or gallops    Allergies   Allergen Reactions    Lisinopril Cough      Social History     Socioeconomic History    Marital status:      Spouse name: Not on file    Number of children: Not on file    Years of education: Not on file    Highest education level: Not on file   Tobacco Use    Smoking status: Never Smoker    Smokeless tobacco: Never Used    Tobacco comment: occ   Substance and Sexual Activity    Alcohol use:  Yes     Alcohol/week: 4.0 standard drinks     Types: 4 Cans of beer per week     Comment: on saturday    Drug use: No    Sexual activity: Yes Other Topics Concern      Family History   Problem Relation Age of Onset    Lung Disease Mother     Diabetes Mother     Cancer Mother     Diabetes Father     Heart Disease Father     Lung Disease Father       Past Surgical History:   Procedure Laterality Date    HX APPENDECTOMY      HX CERVICAL FUSION      HX HERNIA REPAIR      HX ORTHOPAEDIC      HX TONSILLECTOMY      HX TONSILLECTOMY      NM INS NEW/RPLCMT PRM PM W/TRANSV ELTRD ATRIAL&VENT  11/19/2020         NM INS NEW/RPLCMT PRM PM W/TRANSV ELTRD ATRIAL&VENT Left 11/19/2020    INSERT PPM [facility] [place] [address] CATH LAB      Past Medical History:   Diagnosis Date    Hypertension       Current Outpatient Medications   Medication Sig Dispense Refill    cholecalciferol (Vitamin D3) (1000 Units /25 mcg) tablet Take 1,000 Units by mouth daily.  metFORMIN ER (GLUCOPHAGE XR) 500 mg tablet TAKE 1 TABLET BY MOUTH ONCE DAILY WITH DINNER 90 Tab 1    atorvastatin (LIPITOR) 10 mg tablet Take 1 Tab by mouth daily. 90 Tab 1    losartan (COZAAR) 25 mg tablet Take 1 tablet by mouth once daily in the PM 90 Tab 1    hydroCHLOROthiazide (HYDRODIURIL) 25 mg tablet Take 1 Tab by mouth daily. 90 Tab 2    glucose blood VI test strips (FREESTYLE LITE STRIPS) strip Check fasting blood sugar in AM X 1. 50 Strip 11    OTHER Indications: Black Cherry Extract      Blood-Glucose Meter (FREESTYLE LITE METER) monitoring kit Check fasting blood sugar in AM X 1. 1 Kit 0    lancets misc Check fasting blood sugar in AM X 1. 50 Each 11    multivitamin (ONE A DAY) tablet Take 1 Tab by mouth daily. Assessment/ Plan:   Diagnoses and all orders for this visit:    1. Type 2 diabetes mellitus without complication, without long-term current use of insulin (HCC)  -     AMB POC HEMOGLOBIN A1C is 5.4. I advised him to stop the metformin however he would like to wait for another three months.             Last Point of Care HGB A1C  Hemoglobin A1c (POC)   Date Value Ref Range Status   03/16/2021 5.4 % Final        -     METABOLIC PANEL, COMPREHENSIVE; Future  -     metFORMIN ER (GLUCOPHAGE XR) 500 mg tablet; TAKE 1 TABLET BY MOUTH ONCE DAILY WITH DINNER  -     atorvastatin (LIPITOR) 10 mg tablet; Take 1 Tab by mouth daily. 2. Hyperlipidemia LDL goal <70  -     LIPID PANEL; Future          Lab Results   Component Value Date/Time    LDL, calculated 82.8 03/16/2021 09:29 AM       -   Continue  atorvastatin (LIPITOR) 10 mg tablet; Take 1 Tab by mouth daily. 3. Essential hypertension      BP well controlled. 4. Cardiac pacemaker      Stable. Continue follow up with cardiologist.         Medication risks/benefits/costs/interactions/alternatives discussed with patient. Advised patient to call back or return to office if symptoms worsen/change/persist. If patient cannot reach us or should anything more severe/urgent arise he/she should proceed directly to the nearest emergency department. Discussed expected course/resolution/complications of diagnosis in detail with patient. Patient given a written after visit summary which includes her diagnoses, current medications and vitals. Patient expressed understanding with the diagnosis and plan. Follow-up and Dispositions    · Return in about 3 months (around 6/16/2021), or if symptoms worsen or fail to improve, for Bring diabetic log book. , Cholesterol, blood pressure. Dg Weaver

## 2021-05-10 DIAGNOSIS — I10 ESSENTIAL HYPERTENSION: ICD-10-CM

## 2021-05-11 RX ORDER — LOSARTAN POTASSIUM 25 MG/1
TABLET ORAL
Qty: 90 TAB | Refills: 1 | Status: SHIPPED | OUTPATIENT
Start: 2021-05-11 | End: 2021-11-07

## 2021-06-08 ENCOUNTER — OFFICE VISIT (OUTPATIENT)
Dept: PRIMARY CARE CLINIC | Age: 61
End: 2021-06-08
Payer: COMMERCIAL

## 2021-06-08 VITALS
TEMPERATURE: 97.5 F | HEART RATE: 74 BPM | HEIGHT: 72 IN | RESPIRATION RATE: 16 BRPM | WEIGHT: 261 LBS | DIASTOLIC BLOOD PRESSURE: 80 MMHG | BODY MASS INDEX: 35.35 KG/M2 | SYSTOLIC BLOOD PRESSURE: 126 MMHG | OXYGEN SATURATION: 94 %

## 2021-06-08 DIAGNOSIS — Z00.00 WELL ADULT ON ROUTINE HEALTH CHECK: Primary | ICD-10-CM

## 2021-06-08 DIAGNOSIS — E78.5 HYPERLIPIDEMIA LDL GOAL <70: ICD-10-CM

## 2021-06-08 DIAGNOSIS — E11.9 TYPE 2 DIABETES MELLITUS WITHOUT COMPLICATION, WITHOUT LONG-TERM CURRENT USE OF INSULIN (HCC): ICD-10-CM

## 2021-06-08 DIAGNOSIS — E66.01 OBESITY, MORBID (HCC): ICD-10-CM

## 2021-06-08 PROCEDURE — 99396 PREV VISIT EST AGE 40-64: CPT | Performed by: FAMILY MEDICINE

## 2021-06-08 RX ORDER — ATORVASTATIN CALCIUM 10 MG/1
10 TABLET, FILM COATED ORAL DAILY
Qty: 90 TABLET | Refills: 1 | Status: SHIPPED | OUTPATIENT
Start: 2021-06-08 | End: 2022-05-09 | Stop reason: SDUPTHER

## 2021-06-08 NOTE — PROGRESS NOTES
Identified pt with two pt identifiers(name and ). Chief Complaint   Patient presents with    Complete Physical      Vitals:    21 0756   BP: 126/80   Pulse: 74   Resp: 16   Temp: 97.5 °F (36.4 °C)   TempSrc: Temporal   SpO2: 94%   Weight: 261 lb (118.4 kg)   Height: 6' (1.829 m)   PainSc:   0 - No pain      Health Maintenance Due   Topic    Pneumococcal 0-64 years (1 of 2 - PPSV23)    COVID-19 Vaccine (1)    Shingrix Vaccine Age 49> (1 of 2)    Eye Exam Retinal or Dilated        Depression Screening:  :     3 most recent PHQ Screens 2021   Little interest or pleasure in doing things Not at all   Feeling down, depressed, irritable, or hopeless Not at all   Total Score PHQ 2 0        Fall Risk Assessment:  :     Fall Risk Assessment, last 12 mths 3/16/2021   Able to walk? Yes   Fall in past 12 months? 0   Do you feel unsteady? 0   Are you worried about falling 0       Abuse Screening:  :     Abuse Screening Questionnaire 2021   Do you ever feel afraid of your partner? N   Are you in a relationship with someone who physically or mentally threatens you? N   Is it safe for you to go home? Y        Coordination of Care Questionnaire:  :     1. Have you been to the ER, urgent care clinic since your last visit? Hospitalized since your last visit? No    2. Have you seen or consulted any other health care providers outside of the [address] [place] [address] since your last visit? Include any pap smears or colon screening.  No

## 2021-06-08 NOTE — PROGRESS NOTES
Subjective:   Yeimi Arriola is a 62 y.o. male presenting for his annual checkup. No concerns. Has been compliant with his medications. COVID vaccine up to date. ROS:  Feeling well. No dyspnea or chest pain on exertion. No abdominal pain, change in bowel habits, black or bloody stools. No urinary tract or prostatic symptoms. No neurological complaints. Specific concerns today: none    Lab Results   Component Value Date/Time    Cholesterol, total 167 03/16/2021 09:29 AM    HDL Cholesterol 52 03/16/2021 09:29 AM    LDL, calculated 82.8 03/16/2021 09:29 AM    VLDL, calculated 32.2 03/16/2021 09:29 AM    Triglyceride 161 (H) 03/16/2021 09:29 AM    CHOL/HDL Ratio 3.2 03/16/2021 09:29 AM     Lab Results   Component Value Date/Time    Hemoglobin A1c 6.0 (H) 07/21/2020 08:55 AM    Hemoglobin A1c (POC) 5.4 03/16/2021 08:51 AM         Patient Active Problem List   Diagnosis Code    Sleep apnea in adult G47.30    Obesity, morbid ([facility]) E66.01    Type 2 diabetes mellitus without complication, without long-term current use of insulin ([facility]) E11.9    Essential hypertension I10    Bradycardia R00.1    Dyslipidemia E78.5    Junctional bradycardia R00.1    Pacemaker Z95.0     Current Outpatient Medications   Medication Sig Dispense Refill    atorvastatin (LIPITOR) 10 mg tablet Take 1 Tablet by mouth daily. 90 Tablet 1    losartan (COZAAR) 25 mg tablet Take 1 tablet by mouth once daily in the PM 90 Tab 1    metFORMIN ER (GLUCOPHAGE XR) 500 mg tablet TAKE 1 TABLET BY MOUTH ONCE DAILY WITH DINNER 90 Tab 1    cholecalciferol (Vitamin D3) (1000 Units /25 mcg) tablet Take 1,000 Units by mouth daily.  hydroCHLOROthiazide (HYDRODIURIL) 25 mg tablet Take 1 Tab by mouth daily.  90 Tab 2    glucose blood VI test strips (FREESTYLE LITE STRIPS) strip Check fasting blood sugar in AM X 1. 50 Strip 11    OTHER Indications: Black Cherry Extract      Blood-Glucose Meter (FREESTYLE LITE METER) monitoring kit Check fasting blood sugar in AM X 1. 1 Kit 0    lancets misc Check fasting blood sugar in AM X 1. 50 Each 11    multivitamin (ONE A DAY) tablet Take 1 Tab by mouth daily. Allergies   Allergen Reactions    Lisinopril Cough     Past Medical History:   Diagnosis Date    Hypertension              Objective:     Visit Vitals  /80 (BP 1 Location: Left arm, BP Patient Position: Sitting, BP Cuff Size: Small adult)   Pulse 74   Temp 97.5 °F (36.4 °C) (Temporal)   Resp 16   Ht 6' (1.829 m)   Wt 261 lb (118.4 kg)   SpO2 94%   BMI 35.40 kg/m²     The patient appears well, alert, oriented x 3, in no distress. ENT normal.  Neck supple. No adenopathy or thyromegaly. LIOR. Lungs are clear, good air entry, no wheezes, rhonchi or rales. S1 and S2 normal, no murmurs, regular rate and rhythm. Abdomen is soft without tenderness, guarding, mass or organomegaly.  exam: deferred. Extremities show no edema, normal peripheral pulses. Neurological is normal without focal findings. Assessment/Plan:   healthy adult male  lose weight, increase physical activity, follow low fat diet, follow low salt diet, continue present plan, routine labs ordered, call if any problems. ICD-10-CM ICD-9-CM    1. Well adult on routine health check  Z00.00 V70.0 PSA W/ REFLX FREE PSA      THYROID CASCADE PROFILE      CBC WITH AUTOMATED DIFF   2. Obesity, morbid ([address]  E66.01 278.01    3. Hyperlipidemia LDL goal <70  E78.5 272.4 atorvastatin (LIPITOR) 10 mg tablet   4. Type 2 diabetes mellitus without complication, without long-term current use of insulin (HCC)  E11.9 250.00 atorvastatin (LIPITOR) 10 mg tablet     Diagnoses and all orders for this visit:    1. Well adult on routine health check  -     PSA W/ REFLX FREE PSA; Future  -     THYROID CASCADE PROFILE; Future  -     CBC WITH AUTOMATED DIFF; Future    2. Obesity, morbid ([address]       Counseled on diet and exercise. 3. Hyperlipidemia LDL goal <70  -     atorvastatin (LIPITOR) 10 mg tablet;  Take 1 Tablet by mouth daily. 4. Type 2 diabetes mellitus without complication, without long-term current use of insulin (HCC)  -     atorvastatin (LIPITOR) 10 mg tablet; Take 1 Tablet by mouth daily. Advised to get shingle vaccine. Follow-up and Dispositions    · Return in about 3 months (around 9/8/2021), or if symptoms worsen or fail to improve, for Cholesterol, blood pressure, diabetes. current treatment plan is effective, no change in therapy  reviewed diet, exercise and weight control  specific diabetic recommendations: low cholesterol diet, weight control and daily exercise discussed, foot care discussed and Podiatry visits discussed, annual eye examinations at Ophthalmology discussed and glycohemoglobin and other lab monitoring discussed.

## 2021-07-28 ENCOUNTER — OFFICE VISIT (OUTPATIENT)
Dept: CARDIOLOGY CLINIC | Age: 61
End: 2021-07-28
Payer: COMMERCIAL

## 2021-07-28 DIAGNOSIS — Z95.0 CARDIAC PACEMAKER IN SITU: Primary | ICD-10-CM

## 2021-07-28 PROCEDURE — 93296 REM INTERROG EVL PM/IDS: CPT | Performed by: INTERNAL MEDICINE

## 2021-07-28 NOTE — LETTER
7/28/2021 1:58 PM    Mr. Yamilet Ivy [id number]              After careful review of your remote check of your implated device on 9-70-63. I have concluded that your device is working properly. Your next:                Automatic remote check ( from home) is scheduled for  11-1-21                             If you have any questions, please call [address] at [phone].      Additional Comments: ________________________________________________    __________________________________________________________________    __________________________________________________________________              Sincerely,          Billy Sánchez MD [facility] [place]

## 2021-07-28 NOTE — LETTER
2021 1:58 PM    Mr. Michael White  [address] [facility] [address]        Dear Patient,    This letter is to inform you that as of  [facility] will no longer be sending out confirmation letters for remote transmissions through the [employer] All letters in the future will be posted in an electronic medical records format therefore we highly encourage enrollment in [employer] patient's portal. Once enrolled you will have access to any letters we send as well as appointment information that can be found in your medical record. Our EP team would contact you via phone if there are significant abnormal findings so you can discuss with Dr. Josie Kenyon further in office. Missed transmission letters will also be digitized in [employer] but we will continue to send those out through the mail as well. How to register for [employer]:    - Visit [employer]  - Click Create an Account  - Provide your name, address, and   - You will then be asked a short series of questions to verify your identity. This helps to ensure that no one else can access your information.   - If you have any further questions, please contact our office at [phone]. If you choose not to enroll in [employer] or do not have internet access, please kindly let device clinic know and we will accommodate your preference. We are grateful for your understanding and looking forward to this new, improved and more efficient way of communication.            Warm Regards,  [facility] Staff

## 2021-08-17 RX ORDER — HYDROCHLOROTHIAZIDE 25 MG/1
25 TABLET ORAL DAILY
Qty: 90 TABLET | Refills: 1 | Status: SHIPPED | OUTPATIENT
Start: 2021-08-17 | End: 2022-02-04

## 2021-08-17 NOTE — TELEPHONE ENCOUNTER
Patient requesting refill, states he is out of this med      [phone]

## 2021-08-17 NOTE — TELEPHONE ENCOUNTER
Requested Prescriptions     Signed Prescriptions Disp Refills    hydroCHLOROthiazide (HYDRODIURIL) 25 mg tablet 90 Tablet 1     Sig: Take 1 Tablet by mouth daily. Authorizing Provider: JE MAGALLANES     Ordering User: Roderick Lee     Verbal order per Dr. Sary Lawson.     Future Appointments   Date Time Provider Dorothy Leon   11/1/2021 11:00 AM REMOTE1, BHUPINDER ORTIZ AMB   1/5/2022 11:00 AM REMOTE1, BHUPINDER ORTIZ AMB   4/7/2022  9:20 AM PACEMAKER3, BHUPINDER ORTIZ AMB   4/7/2022  9:40 AM MD VIRIDIANA Tavarez BS AMB   \

## 2021-10-31 PROCEDURE — 93294 REM INTERROG EVL PM/LDLS PM: CPT | Performed by: INTERNAL MEDICINE

## 2021-10-31 PROCEDURE — 93296 REM INTERROG EVL PM/IDS: CPT | Performed by: INTERNAL MEDICINE

## 2021-11-01 ENCOUNTER — OFFICE VISIT (OUTPATIENT)
Dept: CARDIOLOGY CLINIC | Age: 61
End: 2021-11-01
Payer: COMMERCIAL

## 2021-11-01 DIAGNOSIS — Z95.0 CARDIAC PACEMAKER IN SITU: Primary | ICD-10-CM

## 2021-11-01 NOTE — LETTER
11/1/2021 9:47 AM    Mr. Karley Laws  [address] [facility] [place] [address]            This letter confirms that we have received your scheduled remote check of your implanted     device on 11-1-21  . Our EP team will contact you via phone if there are significant abnormal    findings. Your next remote check from home is scheduled for 2-2-22. If you have any questions, please call [address] at [phone].                Sincerely,    Henrik Long MD [facility] [place]

## 2021-11-07 DIAGNOSIS — I10 ESSENTIAL HYPERTENSION: ICD-10-CM

## 2021-11-07 RX ORDER — LOSARTAN POTASSIUM 25 MG/1
TABLET ORAL
Qty: 90 TABLET | Refills: 1 | Status: SHIPPED | OUTPATIENT
Start: 2021-11-07 | End: 2022-03-04 | Stop reason: SDUPTHER

## 2022-02-01 PROCEDURE — 93294 REM INTERROG EVL PM/LDLS PM: CPT | Performed by: INTERNAL MEDICINE

## 2022-02-01 PROCEDURE — 93296 REM INTERROG EVL PM/IDS: CPT | Performed by: INTERNAL MEDICINE

## 2022-02-02 ENCOUNTER — OFFICE VISIT (OUTPATIENT)
Dept: CARDIOLOGY CLINIC | Age: 62
End: 2022-02-02
Payer: COMMERCIAL

## 2022-02-02 DIAGNOSIS — Z95.0 CARDIAC PACEMAKER IN SITU: Primary | ICD-10-CM

## 2022-02-02 NOTE — LETTER
2/2/2022 4:35 PM    Mr. Raphael Key  [address] [facility] [address]        This letter confirms that we have received your scheduled remote check of your implanted     device on 2-2-22 . Our EP team will contact you via phone if there are significant abnormal     findings. Your next in-clinic device check is scheduled for 4-21-22 at 8:40 AM .                   If you have any questions, please call [address] [place] [address] at [phone].                Sincerely,    Jimenez Zepeda MD [facility]

## 2022-02-04 RX ORDER — HYDROCHLOROTHIAZIDE 25 MG/1
TABLET ORAL
Qty: 90 TABLET | Refills: 1 | Status: SHIPPED | OUTPATIENT
Start: 2022-02-04 | End: 2022-06-23

## 2022-02-04 NOTE — TELEPHONE ENCOUNTER
Received refill request for HCTZ 25 mg po tabs. Mildly hyponatremic on last check, normal Cr & K. Refill authorized.     Future Appointments   Date Time Provider Dorothy Leon   4/21/2022  8:40 AM MD VIRIDIANA Bowie   4/21/2022  8:40 AM BHUPINDER RINCON AMB

## 2022-03-04 ENCOUNTER — OFFICE VISIT (OUTPATIENT)
Dept: PRIMARY CARE CLINIC | Age: 62
End: 2022-03-04
Payer: COMMERCIAL

## 2022-03-04 VITALS
HEIGHT: 72 IN | RESPIRATION RATE: 18 BRPM | TEMPERATURE: 97.8 F | HEART RATE: 73 BPM | OXYGEN SATURATION: 97 % | BODY MASS INDEX: 36.16 KG/M2 | DIASTOLIC BLOOD PRESSURE: 69 MMHG | WEIGHT: 267 LBS | SYSTOLIC BLOOD PRESSURE: 114 MMHG

## 2022-03-04 DIAGNOSIS — E11.9 TYPE 2 DIABETES MELLITUS WITHOUT COMPLICATION, WITHOUT LONG-TERM CURRENT USE OF INSULIN (HCC): Primary | ICD-10-CM

## 2022-03-04 DIAGNOSIS — I47.1 PAT (PAROXYSMAL ATRIAL TACHYCARDIA) (HCC): ICD-10-CM

## 2022-03-04 DIAGNOSIS — E78.5 HYPERLIPIDEMIA LDL GOAL <70: ICD-10-CM

## 2022-03-04 DIAGNOSIS — I10 ESSENTIAL HYPERTENSION: ICD-10-CM

## 2022-03-04 DIAGNOSIS — E66.01 SEVERE OBESITY (BMI 35.0-39.9) WITH COMORBIDITY (HCC): ICD-10-CM

## 2022-03-04 LAB — HBA1C MFR BLD HPLC: 5.7 %

## 2022-03-04 PROCEDURE — 99214 OFFICE O/P EST MOD 30 MIN: CPT | Performed by: FAMILY MEDICINE

## 2022-03-04 PROCEDURE — 83036 HEMOGLOBIN GLYCOSYLATED A1C: CPT | Performed by: FAMILY MEDICINE

## 2022-03-04 RX ORDER — LOSARTAN POTASSIUM 25 MG/1
TABLET ORAL
Qty: 90 TABLET | Refills: 1 | Status: SHIPPED | OUTPATIENT
Start: 2022-03-04 | End: 2022-09-01 | Stop reason: SDUPTHER

## 2022-03-04 RX ORDER — METFORMIN HYDROCHLORIDE 500 MG/1
TABLET, EXTENDED RELEASE ORAL
Qty: 90 TABLET | Refills: 1 | Status: SHIPPED | OUTPATIENT
Start: 2022-03-04 | End: 2022-06-23 | Stop reason: SDUPTHER

## 2022-03-04 NOTE — PROGRESS NOTES
Subjective:     Chief Complaint   Patient presents with    Diabetes Care Management     Hypertension    Cholesterol Problem        He  is a 65y.o. year old male up for Diabetes, HLD and HTN follow up.       S/P pacemaker. He has been f/u with cardiologist regularly.     DM-2: He is on Metformin 500 mg XL one tab/day . Last  A1c was 5.4. He has been compliant with med . No hypoglycemic events.     HTN: He is on Losartan 25 mg.   Reports that at home BP has been ranging great. .      HLD: Has been taking Lipitor as prescribed .     Denies any CP, soa, cough.         Lab Results   Component Value Date/Time    Hemoglobin A1c 6.0 (H) 07/21/2020 08:55 AM    Hemoglobin A1c (POC) 5.7 03/04/2022 09:32 AM            Pertinent items are noted in HPI. Objective:     Vitals:    03/04/22 0922   BP: 114/69   Pulse: 73   Resp: 18   Temp: 97.8 °F (36.6 °C)   TempSrc: Temporal   SpO2: 97%   Weight: 267 lb (121.1 kg)   Height: 6' (1.829 m)       Physical Examination: General appearance - alert, well appearing, and in no distress, oriented to person, place, and time and overweight  Mental status - alert, oriented to person, place, and time, normal mood, behavior, speech, dress, motor activity, and thought processes  Chest - clear to auscultation, no wheezes, rales or rhonchi, symmetric air entry  Heart - normal rate, regular rhythm, normal S1, S2, no murmurs, rubs, clicks or gallops  Extremities - peripheral pulses normal, no pedal edema, no clubbing or cyanosis, feet normal, good pulses, normal color, temperature and sensation, monofilament sensory exam is normal in both feet. Allergies   Allergen Reactions    Lisinopril Cough      Social History     Socioeconomic History    Marital status:    Tobacco Use    Smoking status: Never Smoker    Smokeless tobacco: Never Used    Tobacco comment: occ   Vaping Use    Vaping Use: Never used   Substance and Sexual Activity    Alcohol use:  Yes     Alcohol/week: 4.0 standard drinks     Types: 4 Cans of beer per week     Comment: on saturday    Drug use: No    Sexual activity: Yes      Family History   Problem Relation Age of Onset    Lung Disease Mother     Diabetes Mother     Cancer Mother     Diabetes Father     Heart Disease Father     Lung Disease Father       Past Surgical History:   Procedure Laterality Date    HX APPENDECTOMY      HX CERVICAL FUSION      HX HERNIA REPAIR      HX ORTHOPAEDIC      HX TONSILLECTOMY      HX TONSILLECTOMY      RI INS NEW/RPLCMT PRM PM W/TRANSV ELTRD ATRIAL&VENT  11/19/2020         RI INS NEW/RPLCMT PRM PM W/TRANSV ELTRD ATRIAL&VENT Left 11/19/2020    INSERT PPM DUAL performed by Edgar Kulkarni MD at [address] [facility]  CATH LAB      Past Medical History:   Diagnosis Date    Hypertension       Current Outpatient Medications   Medication Sig Dispense Refill    metFORMIN ER (GLUCOPHAGE XR) 500 mg tablet TAKE 1 TABLET BY MOUTH ONCE DAILY WITH DINNER 30 Tablet 0    hydroCHLOROthiazide (HYDRODIURIL) 25 mg tablet TAKE 1 TABLET BY MOUTH EVERY DAY 90 Tablet 1    losartan (COZAAR) 25 mg tablet TAKE 1 TABLET BY MOUTH ONCE DAILY IN THE PM 90 Tablet 1    atorvastatin (LIPITOR) 10 mg tablet Take 1 Tablet by mouth daily. 90 Tablet 1    cholecalciferol (Vitamin D3) (1000 Units /25 mcg) tablet Take 1,000 Units by mouth daily.  glucose blood VI test strips (FREESTYLE LITE STRIPS) strip Check fasting blood sugar in AM X 1. 50 Strip 11    OTHER Indications: Black Cherry Extract      Blood-Glucose Meter (FREESTYLE LITE METER) monitoring kit Check fasting blood sugar in AM X 1. 1 Kit 0    lancets misc Check fasting blood sugar in AM X 1. 50 Each 11    multivitamin (ONE A DAY) tablet Take 1 Tab by mouth daily. Assessment/ Plan:   Diagnoses and all orders for this visit:    1.  Type 2 diabetes mellitus without complication, without long-term current use of insulin (HCC)  -     AMB POC HEMOGLOBIN A1C          Last Point of Care HGB A1C  Hemoglobin A1c (POC)   Date Value Ref Range Status   03/04/2022 5.7 % Final        -     METABOLIC PANEL, COMPREHENSIVE; Future  -     MICROALBUMIN, UR, RAND W/ MICROALB/CREAT RATIO; Future  -     HM DIABETES FOOT EXAM  -    Well controlled with  metFORMIN ER (GLUCOPHAGE XR) 500 mg tablet; TAKE 1 TABLET BY MOUTH ONCE DAILY WITH DINNER    2. Essential hypertension  -     METABOLIC PANEL, COMPREHENSIVE; Future  -     losartan (COZAAR) 25 mg tablet; TAKE 1 TABLET BY MOUTH ONCE DAILY IN THE PM    3. Hyperlipidemia LDL goal <70  -     LIPID PANEL; Future  -     METABOLIC PANEL, COMPREHENSIVE; Future    4. Severe obesity (BMI 35.0-39. 9) with comorbidity ([address]        Continue work on diet and exercise. 5. PAT (paroxysmal atrial tachycardia) (HCC)      Stable. Medication risks/benefits/costs/interactions/alternatives discussed with patient. Advised patient to call back or return to office if symptoms worsen/change/persist. If patient cannot reach us or should anything more severe/urgent arise he/she should proceed directly to the nearest emergency department. Discussed expected course/resolution/complications of diagnosis in detail with patient. Patient given a written after visit summary which includes her diagnoses, current medications and vitals. Patient expressed understanding with the diagnosis and plan. Follow-up and Dispositions    · Return in about 6 months (around 9/4/2022), or if symptoms worsen or fail to improve, for diabetes, Hypertension, cholesterol.

## 2022-03-04 NOTE — PROGRESS NOTES
Identified pt with two pt identifiers(name and ). Chief Complaint   Patient presents with    Diabetes Care Management     Hypertension    Cholesterol Problem        3 most recent PHQ Screens 3/4/2022   Little interest or pleasure in doing things Not at all   Feeling down, depressed, irritable, or hopeless Not at all   Total Score PHQ 2 0        Vitals:    22 0922   BP: 114/69   Pulse: 73   Resp: 18   Temp: 97.8 °F (36.6 °C)   TempSrc: Temporal   SpO2: 97%   Weight: 267 lb (121.1 kg)   Height: 6' (1.829 m)       Health Maintenance Due   Topic    Pneumococcal 0-64 years (1 of 2 - PPSV23)    Shingrix Vaccine Age 50> (1 of 2)    Eye Exam Retinal or Dilated     Foot Exam Q1     MICROALBUMIN Q1     Flu Vaccine (1)    COVID-19 Vaccine (3 - Booster for Pfizer series)    A1C test (Diabetic or Prediabetic)     Lipid Screen        1. Have you been to the ER, urgent care clinic since your last visit? Hospitalized since your last visit? No    2. Have you seen or consulted any other health care providers outside of the [address] [place] [address] since your last visit? Include any pap smears or colon screening.  No

## 2022-03-18 PROBLEM — E11.9 TYPE 2 DIABETES MELLITUS WITHOUT COMPLICATION, WITHOUT LONG-TERM CURRENT USE OF INSULIN (HCC): Status: ACTIVE | Noted: 2019-04-18

## 2022-03-18 PROBLEM — Z95.0 PACEMAKER: Status: ACTIVE | Noted: 2020-11-19

## 2022-03-19 PROBLEM — E78.5 DYSLIPIDEMIA: Status: ACTIVE | Noted: 2020-11-19

## 2022-03-19 PROBLEM — E66.01 OBESITY, MORBID (HCC): Status: ACTIVE | Noted: 2018-03-02

## 2022-03-19 PROBLEM — R00.1 BRADYCARDIA: Status: ACTIVE | Noted: 2020-11-18

## 2022-03-20 PROBLEM — R00.1 JUNCTIONAL BRADYCARDIA: Status: ACTIVE | Noted: 2020-11-18

## 2022-03-20 PROBLEM — G47.30 SLEEP APNEA IN ADULT: Status: ACTIVE | Noted: 2017-04-17

## 2022-03-20 PROBLEM — I10 ESSENTIAL HYPERTENSION: Status: ACTIVE | Noted: 2019-04-18

## 2022-04-21 ENCOUNTER — CLINICAL SUPPORT (OUTPATIENT)
Dept: CARDIOLOGY CLINIC | Age: 62
End: 2022-04-21

## 2022-04-21 ENCOUNTER — OFFICE VISIT (OUTPATIENT)
Dept: CARDIOLOGY CLINIC | Age: 62
End: 2022-04-21
Payer: COMMERCIAL

## 2022-04-21 VITALS
DIASTOLIC BLOOD PRESSURE: 72 MMHG | BODY MASS INDEX: 36.35 KG/M2 | SYSTOLIC BLOOD PRESSURE: 120 MMHG | OXYGEN SATURATION: 96 % | WEIGHT: 268 LBS | RESPIRATION RATE: 16 BRPM | HEART RATE: 79 BPM

## 2022-04-21 DIAGNOSIS — Z95.0 CARDIAC PACEMAKER IN SITU: Primary | ICD-10-CM

## 2022-04-21 DIAGNOSIS — I49.5 SSS (SICK SINUS SYNDROME) (HCC): ICD-10-CM

## 2022-04-21 DIAGNOSIS — I47.1 PAT (PAROXYSMAL ATRIAL TACHYCARDIA) (HCC): ICD-10-CM

## 2022-04-21 DIAGNOSIS — I10 ESSENTIAL HYPERTENSION: ICD-10-CM

## 2022-04-21 PROCEDURE — 99214 OFFICE O/P EST MOD 30 MIN: CPT | Performed by: INTERNAL MEDICINE

## 2022-04-21 PROCEDURE — 93280 PM DEVICE PROGR EVAL DUAL: CPT | Performed by: INTERNAL MEDICINE

## 2022-04-21 NOTE — PROGRESS NOTES
Cardiac Electrophysiology OFFICE Note     Subjective:       Rosemarie Elmore is a 65 y. o.patient who presents for follow up s/p Medtronic dual chamber pacemaker (DOI 11/19/2020). Doing well, denies chest pain, palpitations, SOB, PND, orthopnea, syncope, or edema. BP controlled. Previous:   Medtronic dual chamber pacemaker (DOI 11/19/2020) for symptomatic junctional bradycardia 32 bpm with RBBB.         Problem List    Dyslipidemia ICD-10-CM: E78.5   ICD-9-CM: 272.4 11/19/2020     Pacemaker ICD-10-CM: Z95.0   ICD-9-CM: V45.01 11/19/2020   Overview Signed 11/19/2020 12:41 PM by Stanton Flores MD     11/19/2020 dual chamber Medtronic pacemaker         Bradycardia ICD-10-CM: R00.1   ICD-9-CM: 427.89 11/18/2020     Junctional bradycardia ICD-10-CM: R00.1   ICD-9-CM: 427.89 11/18/2020   Overview Signed 11/19/2020  7:31 AM by Arabella Plata NP     Added automatically from request for surgery [id number]         Type 2 diabetes mellitus without complication, without long-term current use of insulin ([facility] [address] ICD-10-CM: E11.9   ICD-9-CM: 250.00 4/18/2019     Essential hypertension ICD-10-CM: I10   ICD-9-CM: 401.9 4/18/2019     Obesity, morbid ([facility] [address] ICD-10-CM: E66.01   ICD-9-CM: 278.01 3/2/2018     Sleep apnea in adult ICD-10-CM: G47.30   ICD-9-CM: 780.57 4/17/2017           Current Outpatient Medications   Medication Sig Dispense Refill   · metFORMIN ER (GLUCOPHAGE XR) 500 mg tablet TAKE 1 TABLET BY MOUTH ONCE DAILY WITH DINNER 90 Tablet 1   · losartan (COZAAR) 25 mg tablet TAKE 1 TABLET BY MOUTH ONCE DAILY IN THE PM 90 Tablet 1   · hydroCHLOROthiazide (HYDRODIURIL) 25 mg tablet TAKE 1 TABLET BY MOUTH EVERY DAY 90 Tablet 1   · atorvastatin (LIPITOR) 10 mg tablet Take 1 Tablet by mouth daily. 90 Tablet 1   · cholecalciferol (Vitamin D3) (1000 Units /25 mcg) tablet Take 1,000 Units by mouth daily. · OTHER Indications: Black Cherry Extract   · multivitamin (ONE A DAY) tablet Take 1 Tab by mouth daily.    · glucose blood VI test strips (FREESTYLE LITE STRIPS) strip Check fasting blood sugar in AM X 1. (Patient not taking: Reported on 4/21/2022) 50 Strip 11   · Blood-Glucose Meter (FREESTYLE LITE METER) monitoring kit Check fasting blood sugar in AM X 1. (Patient not taking: Reported on 4/21/2022) 1 Kit 0   · lancets misc Check fasting blood sugar in AM X 1. (Patient not taking: Reported on 4/21/2022) 50 Each 11     Allergies   Allergen Reactions   · Lisinopril Cough     Past Medical History:   Diagnosis Date   · Hypertension     Past Surgical History:   Procedure Laterality Date   · HX APPENDECTOMY   · HX CERVICAL FUSION   · HX HERNIA REPAIR   · HX ORTHOPAEDIC   · HX TONSILLECTOMY   · HX TONSILLECTOMY   · AL INS NEW/RPLCMT PRM PM W/TRANSV ELTRD ATRIAL&VENT 11/19/2020     · AL INS NEW/RPLCMT PRM PM W/TRANSV ELTRD ATRIAL&VENT Left 11/19/2020   INSERT PPM DUAL performed by Joey Garcia MD at [address], [facility]     Family History   Problem Relation Age of Onset   · Lung Disease Mother   · Diabetes Mother   · Cancer Mother   · Diabetes Father   · Heart Disease Father   · Lung Disease Father     Social History     Tobacco Use   · Smoking status: Never Smoker   · Smokeless tobacco: Never Used   · Tobacco comment: occ   Substance Use Topics   · Alcohol use: Yes   Alcohol/week: 4.0 standard drinks   Types: 4 Cans of beer per week   Comment: on saturday         Review of Systems: all other systems negative   Constitutional: Negative for fever, chills, weight loss, malaise/fatigue. HEENT: Negative for nosebleeds, vision changes. Respiratory: Negative for cough, hemoptysis. Cardiovascular: Negative for chest pain, palpitations, orthopnea, claudication, leg swelling, syncope, and PND. + rare orthostatic lightheadedness   Gastrointestinal: Negative for nausea, vomiting, diarrhea, blood in stool and melena. Genitourinary: Negative for dysuria, and hematuria. Musculoskeletal: Negative for myalgias, arthralgia.    Skin: Negative for rash. Heme: Does not bleed or bruise easily. Neurological: Negative for speech change and focal weakness.       Objective:   Visit Vitals  /72 (BP 1 Location: Right arm, BP Patient Position: Sitting, BP Cuff Size: Adult long)   Pulse 79   Resp 16   Wt 268 lb (121.6 kg)   SpO2 96%   BMI 36.35 kg/m²             Physical Exam:   Constitutional: well-developed and well-nourished. No respiratory distress. Head: Normocephalic and atraumatic. Eyes: Pupils are equal, round. ENT: Hearing grossly normal.   Neck: Supple. No JVD present. Cardiovascular: Normal rate, regular rhythm. Exam reveals no gallop and no friction rub. No murmur heard. Pulmonary/Chest: Effort normal and breath sounds normal. No wheezes. Abdominal: Soft, no tenderness. Obese  Musculoskeletal: Moves extremities independently.  Normal gait. Vasc/lymphatic: No edema. Neurological: Alert, oriented. Skin: Warm & dry.  Left chest pacemaker site well healed. Psychiatric: Normal mood and affect. Behavior is normal. Judgment and thought content normal.         Assessment/Plan:     Imaging/Studies:   Echo (11/19/2020): LVEF 55-60%, mild concentric LVH.  Mildly dilated LA, mod dilated RA.  Trace TR.  Mild PH. ICD-10-CM ICD-9-CM    1. Cardiac pacemaker in situ  Z95.0 V45.01    2. Essential hypertension  I10 401.9    3. PAT (paroxysmal atrial tachycardia) ([facility])  I47.1 427.0    4. SSS (sick sinus syndrome) ([facility])  I49.5 427.81           Medtronic dual chamber pacemaker (DOI 11/19/2020): Implanted for symptomatic junctional bradycardia.  Device check today shows proper lead & generator function.  Generator longevity estimated 12 yrs, 1 mo.  RA 79.82%, RV 3.55%.  Since 02/01/2022, no episodes noted.       PAT: Noted via previous device checks, brief. Last year 2021.  Asymptomatic.  No treatment needed. HTN: BP controlled.  Rare orthostatic lightheadedness.  Continue current medications as previously prescribed.        Remote pacer checks q 3 months.  EP clinic follow up in 1 month. Future Appointments   Date Time Provider Dorothy Leon   7/25/2022 10:00 AM REMOTE1, BHUPINDER ORTIZ AMB   9/1/2022  9:00 AM DO DRAKE Sousa BS AMB   11/2/2022  1:00 PM REMOTE1, BHUPINDER KEMP BS AMB   2/8/2023 12:30 PM REMOTE1, BHUPINDER KEMP BS AMB   5/16/2023  9:20 AM PACEMAKER3, BHUPINDER ORTIZ AMB   5/16/2023  9:40 AM MD VIRIDIANA Morley BS AMB       Thank you for involving me in this patient's care and please call with further concerns or questions. Starla Jose M.D.    Electrophysiology/Cardiology   [facility]   [address], [address] [facility] [address]                             [phone]

## 2022-05-09 DIAGNOSIS — E11.9 TYPE 2 DIABETES MELLITUS WITHOUT COMPLICATION, WITHOUT LONG-TERM CURRENT USE OF INSULIN (HCC): ICD-10-CM

## 2022-05-09 DIAGNOSIS — E78.5 HYPERLIPIDEMIA LDL GOAL <70: ICD-10-CM

## 2022-05-10 RX ORDER — ATORVASTATIN CALCIUM 10 MG/1
10 TABLET, FILM COATED ORAL DAILY
Qty: 90 TABLET | Refills: 1 | Status: SHIPPED | OUTPATIENT
Start: 2022-05-10 | End: 2022-09-01 | Stop reason: SDUPTHER

## 2022-06-23 DIAGNOSIS — E11.9 TYPE 2 DIABETES MELLITUS WITHOUT COMPLICATION, WITHOUT LONG-TERM CURRENT USE OF INSULIN (HCC): ICD-10-CM

## 2022-06-23 RX ORDER — HYDROCHLOROTHIAZIDE 25 MG/1
TABLET ORAL
Qty: 90 TABLET | Refills: 1 | Status: SHIPPED | OUTPATIENT
Start: 2022-06-23 | End: 2022-09-01 | Stop reason: SDUPTHER

## 2022-06-23 NOTE — TELEPHONE ENCOUNTER
Received refill request for HCTZ 25 mg po tabs. Labs in 03/2022 with normal Cr, K, & Na. Refill authorized.     Future Appointments   Date Time Provider Dorothy Carolyn   7/25/2022 10:00 AM REMOTE1, BHUPINDER ORTIZ AMB   9/1/2022  9:00 AM DO DRAKE Enrique BS AMB   11/2/2022  1:00 PM REMOTE1, BHUPINDER ORTIZ AMB   2/8/2023 12:30 PM REMOTE1, BHUPINDER ORTIZ AMB   5/16/2023  9:20 AM PACEMAKER3, BHUPINDER ORTIZ AMB   5/16/2023  9:40 AM MD VIRIDIANA Ramirez BS AMB

## 2022-07-04 RX ORDER — METFORMIN HYDROCHLORIDE 500 MG/1
TABLET, EXTENDED RELEASE ORAL
Qty: 90 TABLET | Refills: 0 | Status: SHIPPED | OUTPATIENT
Start: 2022-07-04 | End: 2022-09-01 | Stop reason: SDUPTHER

## 2022-09-01 ENCOUNTER — OFFICE VISIT (OUTPATIENT)
Dept: PRIMARY CARE CLINIC | Age: 62
End: 2022-09-01
Payer: COMMERCIAL

## 2022-09-01 VITALS
OXYGEN SATURATION: 97 % | DIASTOLIC BLOOD PRESSURE: 66 MMHG | HEIGHT: 72 IN | SYSTOLIC BLOOD PRESSURE: 102 MMHG | BODY MASS INDEX: 36.24 KG/M2 | WEIGHT: 267.6 LBS | RESPIRATION RATE: 18 BRPM | HEART RATE: 80 BPM | TEMPERATURE: 97.5 F

## 2022-09-01 DIAGNOSIS — Z00.00 VISIT FOR WELL MAN HEALTH CHECK: ICD-10-CM

## 2022-09-01 DIAGNOSIS — Z12.5 SCREENING FOR PROSTATE CANCER: ICD-10-CM

## 2022-09-01 DIAGNOSIS — I10 PRIMARY HYPERTENSION: ICD-10-CM

## 2022-09-01 DIAGNOSIS — E11.9 CONTROLLED TYPE 2 DIABETES MELLITUS WITHOUT COMPLICATION, WITHOUT LONG-TERM CURRENT USE OF INSULIN (HCC): Primary | ICD-10-CM

## 2022-09-01 DIAGNOSIS — E78.5 HYPERLIPIDEMIA, UNSPECIFIED HYPERLIPIDEMIA TYPE: ICD-10-CM

## 2022-09-01 LAB
ALBUMIN SERPL-MCNC: 4.2 G/DL (ref 3.5–5)
ALBUMIN/GLOB SERPL: 1.3 {RATIO} (ref 1.1–2.2)
ALP SERPL-CCNC: 66 U/L (ref 45–117)
ALT SERPL-CCNC: 45 U/L (ref 12–78)
ANION GAP SERPL CALC-SCNC: 4 MMOL/L (ref 5–15)
AST SERPL-CCNC: 33 U/L (ref 15–37)
BILIRUB SERPL-MCNC: 0.9 MG/DL (ref 0.2–1)
BUN SERPL-MCNC: 16 MG/DL (ref 6–20)
BUN/CREAT SERPL: 18 (ref 12–20)
CALCIUM SERPL-MCNC: 9.4 MG/DL (ref 8.5–10.1)
CHLORIDE SERPL-SCNC: 105 MMOL/L (ref 97–108)
CHOLEST SERPL-MCNC: 154 MG/DL
CO2 SERPL-SCNC: 30 MMOL/L (ref 21–32)
CREAT SERPL-MCNC: 0.9 MG/DL (ref 0.7–1.3)
EST. AVERAGE GLUCOSE BLD GHB EST-MCNC: 120 MG/DL
GLOBULIN SER CALC-MCNC: 3.2 G/DL (ref 2–4)
GLUCOSE SERPL-MCNC: 118 MG/DL (ref 65–100)
HBA1C MFR BLD: 5.8 % (ref 4–5.6)
HDLC SERPL-MCNC: 45 MG/DL
HDLC SERPL: 3.4 {RATIO} (ref 0–5)
LDLC SERPL CALC-MCNC: 59.2 MG/DL (ref 0–100)
POTASSIUM SERPL-SCNC: 4.2 MMOL/L (ref 3.5–5.1)
PROT SERPL-MCNC: 7.4 G/DL (ref 6.4–8.2)
PSA SERPL-MCNC: 2.2 NG/ML (ref 0.01–4)
SODIUM SERPL-SCNC: 139 MMOL/L (ref 136–145)
TRIGL SERPL-MCNC: 249 MG/DL (ref ?–150)
VLDLC SERPL CALC-MCNC: 49.8 MG/DL

## 2022-09-01 PROCEDURE — 99214 OFFICE O/P EST MOD 30 MIN: CPT | Performed by: FAMILY MEDICINE

## 2022-09-01 PROCEDURE — 99396 PREV VISIT EST AGE 40-64: CPT | Performed by: FAMILY MEDICINE

## 2022-09-01 RX ORDER — METFORMIN HYDROCHLORIDE 500 MG/1
TABLET, EXTENDED RELEASE ORAL
Qty: 90 TABLET | Refills: 3 | Status: SHIPPED | OUTPATIENT
Start: 2022-09-01

## 2022-09-01 RX ORDER — LOSARTAN POTASSIUM 25 MG/1
TABLET ORAL
Qty: 90 TABLET | Refills: 3 | Status: SHIPPED | OUTPATIENT
Start: 2022-09-01

## 2022-09-01 RX ORDER — ATORVASTATIN CALCIUM 10 MG/1
10 TABLET, FILM COATED ORAL DAILY
Qty: 90 TABLET | Refills: 3 | Status: SHIPPED | OUTPATIENT
Start: 2022-09-01

## 2022-09-01 RX ORDER — HYDROCHLOROTHIAZIDE 25 MG/1
25 TABLET ORAL DAILY
Qty: 90 TABLET | Refills: 3 | Status: SHIPPED | OUTPATIENT
Start: 2022-09-01

## 2022-09-01 NOTE — PROGRESS NOTES
HPI:  Cl Monk is a 65 y.o. male presenting for well man exam.     Had eye exam last week with Dr. Maggie Miranda at [facility]. Will second records. Everything was normal.     Diet: normal, adhering to diabetic diet  Exercise: mainly walk and hike  Tobacco Use: former smoker, >15 years ago   Alcohol: rarely  Sexually active: yes  Desires STD screening: no    Allergies- reviewed: Allergies   Allergen Reactions    Lisinopril Cough       Medications- reviewed:   Current Outpatient Medications   Medication Sig    atorvastatin (LIPITOR) 10 mg tablet Take 1 Tablet by mouth daily. hydroCHLOROthiazide (HYDRODIURIL) 25 mg tablet Take 1 Tablet by mouth daily. losartan (COZAAR) 25 mg tablet TAKE 1 TABLET BY MOUTH ONCE DAILY IN THE PM    metFORMIN ER (GLUCOPHAGE XR) 500 mg tablet TAKE 1 TABLET BY MOUTH ONCE DAILY WITH DINNER    cholecalciferol (VITAMIN D3) (1000 Units /25 mcg) tablet Take 1,000 Units by mouth daily. OTHER Indications: Black Cherry Extract    multivitamin (ONE A DAY) tablet Take 1 Tab by mouth daily. glucose blood VI test strips (FREESTYLE LITE STRIPS) strip Check fasting blood sugar in AM X 1. (Patient not taking: Reported on 4/21/2022)    Blood-Glucose Meter (FREESTYLE LITE METER) monitoring kit Check fasting blood sugar in AM X 1. (Patient not taking: No sig reported)    lancets misc Check fasting blood sugar in AM X 1. (Patient not taking: No sig reported)     No current facility-administered medications for this visit.          Past Medical History- reviewed:  Past Medical History:   Diagnosis Date    Hypertension          Past Surgical History- reviewed:   Past Surgical History:   Procedure Laterality Date    HX APPENDECTOMY      HX CERVICAL FUSION      HX HERNIA REPAIR      HX ORTHOPAEDIC      HX TONSILLECTOMY      HX TONSILLECTOMY      NH INS NEW/RPLCMT PRM PM W/TRANSV ELTRD ATRIAL&VENT  11/19/2020         NH INS NEW/RPLCMT PRM PM W/TRANSV ELTRD ATRIAL&VENT Left 11/19/2020    INSERT PPM DUAL performed by Lorelei Trinh MD at [address]         Family History - reviewed:  Family History   Problem Relation Age of Onset    Lung Disease Mother     Diabetes Mother     Cancer Mother     Diabetes Father     Heart Disease Father     Lung Disease Father          Social History - reviewed:  Social History     Socioeconomic History    Marital status:      Spouse name: Not on file    Number of children: Not on file    Years of education: Not on file    Highest education level: Not on file   Occupational History    Not on file   Tobacco Use    Smoking status: Never    Smokeless tobacco: Never    Tobacco comments:     occ   Vaping Use    Vaping Use: Never used   Substance and Sexual Activity    Alcohol use:  Yes     Alcohol/week: 4.0 standard drinks     Types: 4 Cans of beer per week     Comment: on saturday    Drug use: No    Sexual activity: Yes   Other Topics Concern     Service Not Asked    Blood Transfusions Not Asked    Caffeine Concern Not Asked    Occupational Exposure Not Asked    Hobby Hazards Not Asked    Sleep Concern Not Asked    Stress Concern Not Asked    Weight Concern Not Asked    Special Diet Not Asked    Back Care Not Asked    Exercise Not Asked    Bike Helmet Not Asked    Seat Belt Not Asked    Self-Exams Not Asked   Social History Narrative    Not on file     Social Determinants of Health     Financial Resource Strain: Unknown    Difficulty of Paying Living Expenses: Patient refused   Food Insecurity: Unknown    Worried About Running Out of Food in the Last Year: Patient refused    Ran Out of Food in the Last Year: Patient refused   Transportation Needs: Not on file   Physical Activity: Sufficiently Active    Days of Exercise per Week: 5 days    Minutes of Exercise per Session: 40 min   Stress: Not on file   Social Connections: Not on file   Intimate Partner Violence: Unknown    Fear of Current or Ex-Partner: Patient refused    Emotionally Abused: Patient refused Physically Abused: Patient refused    Sexually Abused: Patient refused   Housing Stability: Not on file         Immunizations - reviewed:   Immunization History   Administered Date(s) Administered    COVID-19, PFIZER PURPLE top, DILUTE for use, (age 15 y+), IM, 30mcg/0.3mL 03/19/2021, 04/09/2021    Influenza, FLUARIX, FLULAVAL, FLUZONE (age 10 mo+) AND AFLURIA, (age 1 y+), PF, 0.5mL 10/03/2018, 10/18/2019    Influenza, FLUCELVAX, (age 10 mo+), MDCK, PF 10/17/2020     Flu: NA  Tdap: due, declines  Pneumovax: a few years ago he states  Zostervax: due    Health Maintenance reviewed -  Colonoscopy 2017  DEXA scan NA  Hepatitis C testing done  Lung cancer screening NA  PSA testing desires    Review of Systems   Denies fever, chills, sore throat, cough, chest pain, shortness of breath, abd pain, dysuria, hematuria    Physical Exam  Visit Vitals  /66 (BP 1 Location: Left upper arm, BP Patient Position: Sitting, BP Cuff Size: Adult)   Pulse 80   Temp 97.5 °F (36.4 °C) (Temporal)   Resp 18   Ht 6' (1.829 m)   Wt 267 lb 9.6 oz (121.4 kg)   SpO2 97%   BMI 36.29 kg/m²       General appearance - alert, well appearing, and in no distress  Eyes - sclera anicteric, left eye normal, right eye normal  Ears - bilateral TM's and external ear canals normal  Nose - normal and patent, no erythema, discharge or polyps  Mouth - mucous membranes moist, pharynx normal without lesions  Neck - supple, no significant adenopathy  Chest - clear to auscultation, no wheezes, rales or rhonchi, symmetric air entry  Heart - normal rate, regular rhythm, normal S1, S2, no murmurs, rubs, clicks or gallops  Abdomen - soft, nontender, nondistended, no masses or organomegaly  Neurological - alert, oriented, normal speech, no focal findings or movement disorder noted  Musculoskeletal - full range of motion without pain  Skin - normal coloration and turgor, no rashes, no suspicious skin lesions noted  Rectal Exam - not examined    Assessment/Plan: ICD-10-CM ICD-9-CM    1. Controlled type 2 diabetes mellitus without complication, without long-term current use of insulin (HCC)  E11.9 250.00 HEMOGLOBIN A1C WITH EAG      metFORMIN ER (GLUCOPHAGE XR) 500 mg tablet      HEMOGLOBIN A1C WITH EAG      2. Screening for prostate cancer  Z12.5 V76.44 PSA SCREENING (SCREENING)      PSA SCREENING (SCREENING)      3. Hyperlipidemia, unspecified hyperlipidemia type  S26.9 352.0 METABOLIC PANEL, COMPREHENSIVE      atorvastatin (LIPITOR) 10 mg tablet      METABOLIC PANEL, COMPREHENSIVE      4. Primary hypertension  I10 401.9 LIPID PANEL      hydroCHLOROthiazide (HYDRODIURIL) 25 mg tablet      losartan (COZAAR) 25 mg tablet      LIPID PANEL      5. Visit for well man health check  Z00.00 V70.0         I have discussed the diagnosis with the patient and the intended plan as seen in the above orders. Patient verbalized understanding of the plan and agrees with the plan. The patient has received an after-visit summary and questions were answered concerning future plans. I have discussed medication side effects and warnings with the patient as well. Informed patient to return to the office if new symptoms arise.     Tanika Argueta, DO

## 2022-09-01 NOTE — PROGRESS NOTES
Identified pt with two pt identifiers(name and ). Chief Complaint   Patient presents with    Physical        3 most recent PHQ Screens 2022   Little interest or pleasure in doing things Not at all   Feeling down, depressed, irritable, or hopeless Not at all   Total Score PHQ 2 0        Vitals:    22 0914   BP: 102/66   Pulse: 80   Resp: 18   Temp: 97.5 °F (36.4 °C)   TempSrc: Temporal   SpO2: 97%   Weight: 267 lb 9.6 oz (121.4 kg)   Height: 6' (1.829 m)       Health Maintenance Due   Topic Date Due    Pneumococcal 0-64 years (1 - PCV) Never done    Shingrix Vaccine Age 50> (1 of 2) Never done    Eye Exam Retinal or Dilated  2021    COVID-19 Vaccine (3 - Booster for [facility] series) 2021    Flu Vaccine (1) 2022        1. Have you been to the ER, urgent care clinic since your last visit? Hospitalized since your last visit? No    2. Have you seen or consulted any other health care providers outside of the [address] [place] [address] since your last visit? Include any pap smears or colon screening. No    3. For patients aged 39-70: Has the patient had a colonoscopy / FIT/ Cologuard? Yes - no Care Gap present      If the patient is female:    4. For patients aged 41-77: Has the patient had a mammogram within the past 2 years? NA - based on age or sex      11. For patients aged 21-65: Has the patient had a pap smear?  NA - based on age or sex

## 2023-02-08 ENCOUNTER — OFFICE VISIT (OUTPATIENT)
Dept: CARDIOLOGY CLINIC | Age: 63
End: 2023-02-08
Payer: COMMERCIAL

## 2023-02-08 DIAGNOSIS — Z95.0 CARDIAC PACEMAKER IN SITU: Primary | ICD-10-CM

## 2023-04-25 ENCOUNTER — OFFICE VISIT (OUTPATIENT)
Dept: PRIMARY CARE CLINIC | Age: 63
End: 2023-04-25
Payer: COMMERCIAL

## 2023-04-25 VITALS
BODY MASS INDEX: 38.79 KG/M2 | HEIGHT: 72 IN | HEART RATE: 75 BPM | OXYGEN SATURATION: 100 % | WEIGHT: 286.4 LBS | SYSTOLIC BLOOD PRESSURE: 117 MMHG | TEMPERATURE: 97.8 F | DIASTOLIC BLOOD PRESSURE: 70 MMHG | RESPIRATION RATE: 16 BRPM

## 2023-04-25 DIAGNOSIS — E11.9 CONTROLLED TYPE 2 DIABETES MELLITUS WITHOUT COMPLICATION, WITHOUT LONG-TERM CURRENT USE OF INSULIN (HCC): Primary | ICD-10-CM

## 2023-04-25 DIAGNOSIS — I10 PRIMARY HYPERTENSION: ICD-10-CM

## 2023-04-25 DIAGNOSIS — E66.01 SEVERE OBESITY (BMI 35.0-39.9) WITH COMORBIDITY (HCC): ICD-10-CM

## 2023-04-25 DIAGNOSIS — Z23 NEED FOR SHINGLES VACCINE: ICD-10-CM

## 2023-04-25 DIAGNOSIS — E78.5 HYPERLIPIDEMIA, UNSPECIFIED HYPERLIPIDEMIA TYPE: ICD-10-CM

## 2023-04-25 DIAGNOSIS — E11.9 CONTROLLED TYPE 2 DIABETES MELLITUS WITHOUT COMPLICATION, WITHOUT LONG-TERM CURRENT USE OF INSULIN (HCC): ICD-10-CM

## 2023-04-25 PROCEDURE — 99214 OFFICE O/P EST MOD 30 MIN: CPT | Performed by: FAMILY MEDICINE

## 2023-04-25 PROCEDURE — 3074F SYST BP LT 130 MM HG: CPT | Performed by: FAMILY MEDICINE

## 2023-04-25 PROCEDURE — 3078F DIAST BP <80 MM HG: CPT | Performed by: FAMILY MEDICINE

## 2023-04-25 RX ORDER — ZOSTER VACCINE RECOMBINANT, ADJUVANTED 50 MCG/0.5
0.5 KIT INTRAMUSCULAR ONCE
Qty: 0.5 ML | Refills: 0 | Status: SHIPPED | OUTPATIENT
Start: 2023-04-25 | End: 2023-04-25

## 2023-04-25 NOTE — PROGRESS NOTES
HPI     Chief Complaint   Patient presents with    Follow-up     diabetes     He is a 59 y.o. male who presents for follow up. DM2 - Currently on Metformin. Had eye exam in last year with Dr. Jorge Rice. Had PNA vaccine in past. Due for labs. HTN - Currently on Losartan-HCTZ. HLD - Currently on Lipitor. Review of Systems   Eyes:  Negative for visual disturbance. Respiratory:  Negative for shortness of breath. Cardiovascular:  Negative for chest pain. Neurological:  Negative for headaches. Reviewed PmHx, RxHx, FmHx, SocHx, AllgHx and updated and dated in the chart. Physical Exam:  Visit Vitals  /70 (BP 1 Location: Left upper arm, BP Patient Position: Sitting)   Pulse 75   Temp 97.8 °F (36.6 °C) (Temporal)   Resp 16   Ht 6' (1.829 m)   Wt 286 lb 6.4 oz (129.9 kg)   SpO2 100%   BMI 38.84 kg/m²     Physical Exam  Vitals reviewed. Constitutional:       General: He is not in acute distress. Appearance: Normal appearance. He is not ill-appearing. Cardiovascular:      Rate and Rhythm: Normal rate and regular rhythm. Heart sounds: No murmur heard. Pulmonary:      Effort: Pulmonary effort is normal. No respiratory distress. Breath sounds: Normal breath sounds. No wheezing, rhonchi or rales. Neurological:      Mental Status: He is alert. Comments: Diabetic foot exam:     Left: Filament test normal sensation with micro filament   Pulse PT: 2+ (normal)   Deformities: None  Right: Filament test normal sensation with micro filament   Pulse PT: 2+ (normal)   Deformities: None    Psychiatric:         Mood and Affect: Mood normal.         Behavior: Behavior normal.     No results found for this or any previous visit (from the past 12 hour(s)). Assessment / Plan     Diagnoses and all orders for this visit:    1. Controlled type 2 diabetes mellitus without complication, without long-term current use of insulin (HCC) - Continue Metformin. Check labs.    - HEMOGLOBIN A1C WITH EAG; Future  -     METABOLIC PANEL, BASIC; Future  -     MICROALBUMIN, UR, RAND W/ MICROALB/CREAT RATIO; Future  -     HM DIABETES FOOT EXAM    2. Primary hypertension - BP stable. Cont Losartan-HCTZ. Check BMP.   -     METABOLIC PANEL, BASIC; Future    3. Hyperlipidemia, unspecified hyperlipidemia type - Cont statin. 4. Severe obesity (BMI 35.0-39. 9) with comorbidity ([facility] [address]    5. Need for shingles vaccine  -     varicella-zoster recombinant, PF, (Shingrix, PF,) 50 mcg/0.5 mL susr injection; 0.5 mL by IntraMUSCular route once for 1 dose. I have discussed the diagnosis with the patient and the intended plan as seen in the above orders. The patient has received an after-visit summary and questions were answered concerning future plans. I have discussed medication side effects and warnings with the patient as well.     Luke Bird, DO

## 2023-04-25 NOTE — PROGRESS NOTES
Chief Complaint   Patient presents with    Follow-up     diabetes       There were no vitals taken for this visit. 1. Have you been to the ER, urgent care clinic since your last visit? Hospitalized since your last visit? No    2. Have you seen or consulted any other health care providers outside of the [address] [place] [address] since your last visit? Include any pap smears or colon screening. No      3. For patients aged 39-70: Has the patient had a colonoscopy / FIT/ Cologuard? NA - based on age      If the patient is female:    4. For patients aged 41-77: Has the patient had a mammogram within the past 2 years? Yes - no Care Gap present      5. For patients aged 21-65: Has the patient had a pap smear?  NA - based on age or sex

## 2023-04-26 LAB
ANION GAP SERPL CALC-SCNC: 5 MMOL/L (ref 5–15)
BUN SERPL-MCNC: 17 MG/DL (ref 6–20)
BUN/CREAT SERPL: 18 (ref 12–20)
CALCIUM SERPL-MCNC: 9.2 MG/DL (ref 8.5–10.1)
CHLORIDE SERPL-SCNC: 106 MMOL/L (ref 97–108)
CO2 SERPL-SCNC: 27 MMOL/L (ref 21–32)
CREAT SERPL-MCNC: 0.92 MG/DL (ref 0.7–1.3)
CREAT UR-MCNC: 30.1 MG/DL
EST. AVERAGE GLUCOSE BLD GHB EST-MCNC: 131 MG/DL
GLUCOSE SERPL-MCNC: 127 MG/DL (ref 65–100)
HBA1C MFR BLD: 6.2 % (ref 4–5.6)
MICROALBUMIN UR-MCNC: <0.5 MG/DL
MICROALBUMIN/CREAT UR-RTO: <17 MG/G (ref 0–30)
POTASSIUM SERPL-SCNC: 3.8 MMOL/L (ref 3.5–5.1)
SODIUM SERPL-SCNC: 138 MMOL/L (ref 136–145)

## 2023-05-08 ENCOUNTER — TELEPHONE (OUTPATIENT)
Age: 63
End: 2023-05-08

## 2023-05-23 ENCOUNTER — TELEPHONE (OUTPATIENT)
Age: 63
End: 2023-05-23

## 2023-05-25 RX ORDER — LANCETS 30 GAUGE
EACH MISCELLANEOUS
COMMUNITY
Start: 2019-04-18

## 2023-05-25 RX ORDER — METFORMIN HYDROCHLORIDE 500 MG/1
TABLET, EXTENDED RELEASE ORAL
COMMUNITY
Start: 2022-09-01

## 2023-05-25 RX ORDER — ATORVASTATIN CALCIUM 10 MG/1
10 TABLET, FILM COATED ORAL DAILY
COMMUNITY
Start: 2022-09-01

## 2023-05-25 RX ORDER — LOSARTAN POTASSIUM 25 MG/1
TABLET ORAL
COMMUNITY
Start: 2022-09-01

## 2023-05-25 RX ORDER — HYDROCHLOROTHIAZIDE 25 MG/1
25 TABLET ORAL DAILY
COMMUNITY
Start: 2022-09-01 | End: 2023-07-17

## 2023-07-17 RX ORDER — HYDROCHLOROTHIAZIDE 25 MG/1
TABLET ORAL
Qty: 90 TABLET | Refills: 2 | Status: SHIPPED | OUTPATIENT
Start: 2023-07-17

## 2023-09-21 DIAGNOSIS — E11.9 TYPE 2 DIABETES MELLITUS WITHOUT COMPLICATIONS (HCC): ICD-10-CM

## 2023-09-25 DIAGNOSIS — I10 ESSENTIAL (PRIMARY) HYPERTENSION: ICD-10-CM

## 2023-09-25 DIAGNOSIS — E78.5 HYPERLIPIDEMIA, UNSPECIFIED: ICD-10-CM

## 2023-09-25 DIAGNOSIS — E11.9 TYPE 2 DIABETES MELLITUS WITHOUT COMPLICATIONS (HCC): ICD-10-CM

## 2023-09-25 RX ORDER — METFORMIN HYDROCHLORIDE 500 MG/1
500 TABLET, EXTENDED RELEASE ORAL
Qty: 90 TABLET | Refills: 0 | Status: SHIPPED | OUTPATIENT
Start: 2023-09-25

## 2023-09-28 RX ORDER — ATORVASTATIN CALCIUM 10 MG/1
10 TABLET, FILM COATED ORAL DAILY
Qty: 30 TABLET | Refills: 0 | Status: SHIPPED | OUTPATIENT
Start: 2023-09-28

## 2023-09-28 RX ORDER — LOSARTAN POTASSIUM 25 MG/1
25 TABLET ORAL EVERY EVENING
Qty: 90 TABLET | Refills: 1 | Status: SHIPPED | OUTPATIENT
Start: 2023-09-28

## 2023-11-17 DIAGNOSIS — E11.9 TYPE 2 DIABETES MELLITUS WITHOUT COMPLICATIONS (HCC): ICD-10-CM

## 2023-11-17 DIAGNOSIS — E78.5 HYPERLIPIDEMIA, UNSPECIFIED: ICD-10-CM

## 2023-11-21 RX ORDER — ATORVASTATIN CALCIUM 10 MG/1
10 TABLET, FILM COATED ORAL DAILY
Qty: 30 TABLET | Refills: 0 | OUTPATIENT
Start: 2023-11-21

## 2024-01-18 ENCOUNTER — OFFICE VISIT (OUTPATIENT)
Dept: PRIMARY CARE CLINIC | Facility: CLINIC | Age: 64
End: 2024-01-18

## 2024-01-18 VITALS
OXYGEN SATURATION: 97 % | DIASTOLIC BLOOD PRESSURE: 72 MMHG | HEIGHT: 72 IN | BODY MASS INDEX: 38.6 KG/M2 | SYSTOLIC BLOOD PRESSURE: 111 MMHG | RESPIRATION RATE: 17 BRPM | WEIGHT: 285 LBS | HEART RATE: 71 BPM

## 2024-01-18 DIAGNOSIS — I10 ESSENTIAL (PRIMARY) HYPERTENSION: Primary | ICD-10-CM

## 2024-01-18 DIAGNOSIS — Z12.5 SCREENING FOR PROSTATE CANCER: ICD-10-CM

## 2024-01-18 DIAGNOSIS — E11.9 TYPE 2 DIABETES MELLITUS WITHOUT COMPLICATIONS (HCC): ICD-10-CM

## 2024-01-18 DIAGNOSIS — I10 ESSENTIAL (PRIMARY) HYPERTENSION: ICD-10-CM

## 2024-01-18 DIAGNOSIS — E11.9 TYPE 2 DIABETES MELLITUS WITHOUT COMPLICATION, WITHOUT LONG-TERM CURRENT USE OF INSULIN (HCC): ICD-10-CM

## 2024-01-18 DIAGNOSIS — Z23 ENCOUNTER FOR IMMUNIZATION: ICD-10-CM

## 2024-01-18 DIAGNOSIS — Z00.00 VISIT FOR WELL MAN HEALTH CHECK: ICD-10-CM

## 2024-01-18 DIAGNOSIS — E78.5 HYPERLIPIDEMIA, UNSPECIFIED: ICD-10-CM

## 2024-01-18 DIAGNOSIS — E78.5 HYPERLIPIDEMIA, UNSPECIFIED HYPERLIPIDEMIA TYPE: ICD-10-CM

## 2024-01-18 RX ORDER — LOSARTAN POTASSIUM 25 MG/1
25 TABLET ORAL EVERY EVENING
Qty: 90 TABLET | Refills: 2 | Status: SHIPPED | OUTPATIENT
Start: 2024-01-18

## 2024-01-18 RX ORDER — HYDROCHLOROTHIAZIDE 25 MG/1
25 TABLET ORAL DAILY
Qty: 90 TABLET | Refills: 2 | Status: SHIPPED | OUTPATIENT
Start: 2024-01-18

## 2024-01-18 RX ORDER — METFORMIN HYDROCHLORIDE 500 MG/1
500 TABLET, EXTENDED RELEASE ORAL
Qty: 90 TABLET | Refills: 2 | Status: SHIPPED | OUTPATIENT
Start: 2024-01-18

## 2024-01-18 RX ORDER — ATORVASTATIN CALCIUM 10 MG/1
10 TABLET, FILM COATED ORAL DAILY
Qty: 90 TABLET | Refills: 2 | Status: SHIPPED | OUTPATIENT
Start: 2024-01-18

## 2024-01-18 RX ORDER — ZOSTER VACCINE RECOMBINANT, ADJUVANTED 50 MCG/0.5
0.5 KIT INTRAMUSCULAR SEE ADMIN INSTRUCTIONS
Qty: 0.5 ML | Refills: 0 | Status: SHIPPED | OUTPATIENT
Start: 2024-01-18 | End: 2024-07-16

## 2024-01-18 SDOH — ECONOMIC STABILITY: HOUSING INSECURITY
IN THE LAST 12 MONTHS, WAS THERE A TIME WHEN YOU DID NOT HAVE A STEADY PLACE TO SLEEP OR SLEPT IN A SHELTER (INCLUDING NOW)?: NO

## 2024-01-18 SDOH — ECONOMIC STABILITY: INCOME INSECURITY: HOW HARD IS IT FOR YOU TO PAY FOR THE VERY BASICS LIKE FOOD, HOUSING, MEDICAL CARE, AND HEATING?: NOT HARD AT ALL

## 2024-01-18 SDOH — ECONOMIC STABILITY: FOOD INSECURITY: WITHIN THE PAST 12 MONTHS, THE FOOD YOU BOUGHT JUST DIDN'T LAST AND YOU DIDN'T HAVE MONEY TO GET MORE.: NEVER TRUE

## 2024-01-18 SDOH — ECONOMIC STABILITY: FOOD INSECURITY: WITHIN THE PAST 12 MONTHS, YOU WORRIED THAT YOUR FOOD WOULD RUN OUT BEFORE YOU GOT MONEY TO BUY MORE.: NEVER TRUE

## 2024-01-18 ASSESSMENT — PATIENT HEALTH QUESTIONNAIRE - PHQ9
SUM OF ALL RESPONSES TO PHQ QUESTIONS 1-9: 0
SUM OF ALL RESPONSES TO PHQ QUESTIONS 1-9: 0
2. FEELING DOWN, DEPRESSED OR HOPELESS: 0
SUM OF ALL RESPONSES TO PHQ QUESTIONS 1-9: 0
SUM OF ALL RESPONSES TO PHQ9 QUESTIONS 1 & 2: 0
1. LITTLE INTEREST OR PLEASURE IN DOING THINGS: 0
SUM OF ALL RESPONSES TO PHQ QUESTIONS 1-9: 0

## 2024-01-18 NOTE — PROGRESS NOTES
\"Have you been to the ER, urgent care clinic since your last visit?  Hospitalized since your last visit?\"    NO    “Have you seen or consulted any other health care providers outside of [facility] since your last visit?”    NO         
(primary) hypertension  CBC    Comprehensive Metabolic Panel    losartan (COZAAR) 25 MG tablet    hydroCHLOROthiazide (HYDRODIURIL) 25 MG tablet      2. Hyperlipidemia, unspecified hyperlipidemia type  Lipid Panel    atorvastatin (LIPITOR) 10 MG tablet      3. Screening for prostate cancer  PSA Screening      4. Visit for well man health check        5. Encounter for immunization  Influenza, FLUCELVAX, (age 6 mo+), IM, Preservative Free, 0.5 mL      6. Type 2 diabetes mellitus without complication, without long-term current use of insulin (HCC)  Hemoglobin A1C    Microalbumin / Creatinine Urine Ratio    metFORMIN (GLUCOPHAGE-XR) 500 MG extended release tablet        DM2 - Continue Metformin. Check A1c, microalbumin/ Cr.   HLD - Continue Lipitor. Check lipids, CMP.  HTN - Cont Losartan. Well controlled. Check CBC, CMP.  Flu VIS given to patient.    No follow-up provider specified.    I have discussed the diagnosis with the patient and the intended plan as seen in the above orders. Patient verbalized understanding of the plan and agrees with the plan. The patient has received an after-visit summary and questions were answered concerning future plans.  I have discussed medication side effects and warnings with the patient as well. Informed patient to return to the office if new symptoms arise.    Vitaliy Campoverde,

## 2024-01-19 LAB
ALBUMIN SERPL-MCNC: 4.2 G/DL (ref 3.5–5)
ALBUMIN/GLOB SERPL: 1.2 (ref 1.1–2.2)
ALP SERPL-CCNC: 67 U/L (ref 45–117)
ALT SERPL-CCNC: 69 U/L (ref 12–78)
ANION GAP SERPL CALC-SCNC: 7 MMOL/L (ref 5–15)
AST SERPL-CCNC: 40 U/L (ref 15–37)
BILIRUB SERPL-MCNC: 0.7 MG/DL (ref 0.2–1)
BUN SERPL-MCNC: 12 MG/DL (ref 6–20)
BUN/CREAT SERPL: 13 (ref 12–20)
CALCIUM SERPL-MCNC: 9.7 MG/DL (ref 8.5–10.1)
CHLORIDE SERPL-SCNC: 105 MMOL/L (ref 97–108)
CHOLEST SERPL-MCNC: 154 MG/DL
CO2 SERPL-SCNC: 29 MMOL/L (ref 21–32)
CREAT SERPL-MCNC: 0.92 MG/DL (ref 0.7–1.3)
CREAT UR-MCNC: 17.6 MG/DL
ERYTHROCYTE [DISTWIDTH] IN BLOOD BY AUTOMATED COUNT: 11.9 % (ref 11.5–14.5)
EST. AVERAGE GLUCOSE BLD GHB EST-MCNC: 140 MG/DL
GLOBULIN SER CALC-MCNC: 3.5 G/DL (ref 2–4)
GLUCOSE SERPL-MCNC: 126 MG/DL (ref 65–100)
HBA1C MFR BLD: 6.5 % (ref 4–5.6)
HCT VFR BLD AUTO: 45.9 % (ref 36.6–50.3)
HDLC SERPL-MCNC: 39 MG/DL
HDLC SERPL: 3.9 (ref 0–5)
HGB BLD-MCNC: 16.4 G/DL (ref 12.1–17)
LDLC SERPL CALC-MCNC: 72 MG/DL (ref 0–100)
MCH RBC QN AUTO: 32 PG (ref 26–34)
MCHC RBC AUTO-ENTMCNC: 35.7 G/DL (ref 30–36.5)
MCV RBC AUTO: 89.5 FL (ref 80–99)
MICROALBUMIN UR-MCNC: <0.5 MG/DL
MICROALBUMIN/CREAT UR-RTO: <28 MG/G (ref 0–30)
NRBC # BLD: 0 K/UL (ref 0–0.01)
NRBC BLD-RTO: 0 PER 100 WBC
PLATELET # BLD AUTO: 201 K/UL (ref 150–400)
PMV BLD AUTO: 11.6 FL (ref 8.9–12.9)
POTASSIUM SERPL-SCNC: 4.2 MMOL/L (ref 3.5–5.1)
PROT SERPL-MCNC: 7.7 G/DL (ref 6.4–8.2)
PSA SERPL-MCNC: 2.4 NG/ML (ref 0.01–4)
RBC # BLD AUTO: 5.13 M/UL (ref 4.1–5.7)
SODIUM SERPL-SCNC: 141 MMOL/L (ref 136–145)
TRIGL SERPL-MCNC: 215 MG/DL
VLDLC SERPL CALC-MCNC: 43 MG/DL
WBC # BLD AUTO: 6.9 K/UL (ref 4.1–11.1)

## 2024-01-28 ENCOUNTER — APPOINTMENT (OUTPATIENT)
Facility: HOSPITAL | Age: 64
End: 2024-01-28
Payer: COMMERCIAL

## 2024-01-28 ENCOUNTER — HOSPITAL ENCOUNTER (EMERGENCY)
Facility: HOSPITAL | Age: 64
Discharge: HOME | End: 2024-01-28
Attending: EMERGENCY MEDICINE | Admitting: EMERGENCY MEDICINE
Payer: COMMERCIAL

## 2024-01-28 VITALS
OXYGEN SATURATION: 96 % | RESPIRATION RATE: 14 BRPM | HEIGHT: 72 IN | SYSTOLIC BLOOD PRESSURE: 114 MMHG | BODY MASS INDEX: 38.6 KG/M2 | WEIGHT: 285 LBS | HEART RATE: 78 BPM | DIASTOLIC BLOOD PRESSURE: 70 MMHG | TEMPERATURE: 98.1 F

## 2024-01-28 DIAGNOSIS — M79.672 LEFT FOOT PAIN: ICD-10-CM

## 2024-01-28 DIAGNOSIS — M25.572 ACUTE LEFT ANKLE PAIN: Primary | ICD-10-CM

## 2024-01-28 PROCEDURE — 73630 X-RAY EXAM OF FOOT: CPT

## 2024-01-28 PROCEDURE — 99284 EMERGENCY DEPT VISIT MOD MDM: CPT

## 2024-01-28 PROCEDURE — 73610 X-RAY EXAM OF ANKLE: CPT

## 2024-01-28 PROCEDURE — 96372 THER/PROPH/DIAG INJ SC/IM: CPT

## 2024-01-28 PROCEDURE — 6360000002 HC RX W HCPCS

## 2024-01-28 RX ORDER — LIDOCAINE 4 G/G
1 PATCH TOPICAL DAILY
Qty: 10 PATCH | Refills: 0 | Status: SHIPPED | OUTPATIENT
Start: 2024-01-28 | End: 2024-02-27

## 2024-01-28 RX ORDER — KETOROLAC TROMETHAMINE 15 MG/ML
15 INJECTION, SOLUTION INTRAMUSCULAR; INTRAVENOUS ONCE
Status: COMPLETED | OUTPATIENT
Start: 2024-01-28 | End: 2024-01-28

## 2024-01-28 RX ORDER — NAPROXEN 500 MG/1
500 TABLET ORAL 2 TIMES DAILY PRN
Qty: 20 TABLET | Refills: 0 | Status: SHIPPED | OUTPATIENT
Start: 2024-01-28 | End: 2024-02-07

## 2024-01-28 RX ORDER — ACETAMINOPHEN 500 MG
1000 TABLET ORAL
Qty: 60 TABLET | Refills: 0 | Status: SHIPPED | OUTPATIENT
Start: 2024-01-28 | End: 2024-02-27

## 2024-01-28 RX ADMIN — KETOROLAC TROMETHAMINE 15 MG: 15 INJECTION, SOLUTION INTRAMUSCULAR; INTRAVENOUS at 11:05

## 2024-01-28 ASSESSMENT — PAIN - FUNCTIONAL ASSESSMENT: PAIN_FUNCTIONAL_ASSESSMENT: 0-10

## 2024-01-28 ASSESSMENT — PAIN SCALES - GENERAL: PAINLEVEL_OUTOF10: 8

## 2024-01-28 ASSESSMENT — PAIN DESCRIPTION - LOCATION: LOCATION: FOOT

## 2024-01-28 ASSESSMENT — PAIN DESCRIPTION - ORIENTATION: ORIENTATION: LEFT

## 2024-01-28 NOTE — ED PROVIDER NOTES
[facility] EMERGENCY DEPT  EMERGENCY DEPARTMENT ENCOUNTER      Pt Name: Warren Jaramillo  MRN: [id number]  Birthdate 1960  Date of evaluation: 1/28/2024  Provider: Veda Mckoy PA-C    CHIEF COMPLAINT       Chief Complaint   Patient presents with    Ankle Pain         HISTORY OF PRESENT ILLNESS   (Location/Symptom, Timing/Onset, Context/Setting, Quality, Duration, Modifying Factors, Severity)  Note limiting factors.   HPI    Warren Jaramillo is a 64 y.o. male with Hx of diabetes, pacemaker, hypertension, junctional bradycardia who presents ambulatory with cane to [facility] with cc of left foot/ankle pain since yesterday.  Ibuprofen, heat, ice without relief.  Mild swelling.  Denies trauma, injury, pain elsewhere, history of blood clots, any other concerns at this time.  Pain is better with movement.  Patient thinks he was sitting at his desk weird with his foot tucked underneath him, and this may be contributory.      PCP: Mandy Campoverde,     There are no other complaints, changes or physical findings at this time.    Review of External Medical Records:     Nursing Notes were reviewed.    REVIEW OF SYSTEMS    (2-9 systems for level 4, 10 or more for level 5)     Review of Systems   All other systems reviewed and are negative.      Except as noted above the remainder of the review of systems was reviewed and negative.       PAST MEDICAL HISTORY     Past Medical History:   Diagnosis Date    Hypertension          SURGICAL HISTORY       Past Surgical History:   Procedure Laterality Date    APPENDECTOMY      CERVICAL FUSION      HERNIA REPAIR      INS NEW/RPLCMT PRM PM W/TRANSV ELTRD ATRIAL&VENT  11/19/2020         INS NEW/RPLCMT PRM PM W/TRANSV ELTRD ATRIAL&VENT Left 11/19/2020    INSERT PPM DUAL performed by Arnol Doll MD at [facility] CARDIAC CATH LAB    ORTHOPEDIC SURGERY      TONSILLECTOMY      TONSILLECTOMY           CURRENT MEDICATIONS       Previous Medications    ATORVASTATIN (LIPITOR) 10 MG TABLET

## 2024-01-28 NOTE — ED TRIAGE NOTES
Pt ambulatory to ED with cane c/o L medial ankle pain x 1 day. Denies specific injury, states he was at work when pain started and got progressively worse with minimal improvement using NSAIDs and heat/cold.

## 2024-01-28 NOTE — DISCHARGE INSTRUCTIONS
As discussed, your workup today is negative for any emergent findings. Follow up with your PCP and ortho for further management. Return to the ER if you experience severe or worsening symptoms.     See the attached results for incidental nonemergent findings on your imaging.

## 2024-05-10 ENCOUNTER — TELEPHONE (OUTPATIENT)
Age: 64
End: 2024-05-10

## 2024-05-10 NOTE — TELEPHONE ENCOUNTER
LM to reschedule 6/13 appt with pacemaker and Dr Doll, provider vacation. Please schedule to next available with Joyce or chilo and same day pacemaker

## 2024-07-11 NOTE — PROGRESS NOTES
taking: Reported on 7/15/2024) 0.5 mL 0    Lancets MISC Check fasting blood sugar in AM X 1. (Patient not taking: Reported on 7/15/2024)       No current facility-administered medications for this visit.          VIJAYA Mcgee - NP  [facility]  [address], [address]  [place], [place] [address] [phone]      CC:Mandy Campoverde DO

## 2024-07-15 ENCOUNTER — OFFICE VISIT (OUTPATIENT)
Age: 64
End: 2024-07-15
Payer: COMMERCIAL

## 2024-07-15 ENCOUNTER — PROCEDURE VISIT (OUTPATIENT)
Age: 64
End: 2024-07-15

## 2024-07-15 VITALS
WEIGHT: 287 LBS | OXYGEN SATURATION: 95 % | DIASTOLIC BLOOD PRESSURE: 78 MMHG | SYSTOLIC BLOOD PRESSURE: 120 MMHG | BODY MASS INDEX: 38.87 KG/M2 | HEIGHT: 72 IN | HEART RATE: 82 BPM

## 2024-07-15 DIAGNOSIS — I10 PRIMARY HYPERTENSION: ICD-10-CM

## 2024-07-15 DIAGNOSIS — Z95.0 PACEMAKER: Primary | ICD-10-CM

## 2024-07-15 DIAGNOSIS — I49.5 SICK SINUS SYNDROME (HCC): ICD-10-CM

## 2024-07-15 DIAGNOSIS — I47.19 PAT (PAROXYSMAL ATRIAL TACHYCARDIA) (HCC): ICD-10-CM

## 2024-07-15 DIAGNOSIS — E66.01 SEVERE OBESITY (BMI 35.0-39.9) WITH COMORBIDITY (HCC): ICD-10-CM

## 2024-07-15 DIAGNOSIS — Z95.0 PRESENCE OF CARDIAC PACEMAKER: Primary | ICD-10-CM

## 2024-07-15 PROCEDURE — 99214 OFFICE O/P EST MOD 30 MIN: CPT | Performed by: NURSE PRACTITIONER

## 2024-07-15 PROCEDURE — 3078F DIAST BP <80 MM HG: CPT | Performed by: NURSE PRACTITIONER

## 2024-07-15 PROCEDURE — 3074F SYST BP LT 130 MM HG: CPT | Performed by: NURSE PRACTITIONER

## 2024-07-25 PROCEDURE — 93296 REM INTERROG EVL PM/IDS: CPT | Performed by: INTERNAL MEDICINE

## 2024-07-25 PROCEDURE — 93294 REM INTERROG EVL PM/LDLS PM: CPT | Performed by: INTERNAL MEDICINE

## 2024-10-12 DIAGNOSIS — E11.9 TYPE 2 DIABETES MELLITUS WITHOUT COMPLICATION, WITHOUT LONG-TERM CURRENT USE OF INSULIN (HCC): ICD-10-CM

## 2024-10-12 DIAGNOSIS — E78.5 HYPERLIPIDEMIA, UNSPECIFIED HYPERLIPIDEMIA TYPE: ICD-10-CM

## 2024-10-14 RX ORDER — ATORVASTATIN CALCIUM 10 MG/1
10 TABLET, FILM COATED ORAL DAILY
Qty: 90 TABLET | Refills: 2 | OUTPATIENT
Start: 2024-10-14

## 2024-10-14 RX ORDER — METFORMIN HYDROCHLORIDE 500 MG/1
500 TABLET, EXTENDED RELEASE ORAL
Qty: 90 TABLET | Refills: 2 | OUTPATIENT
Start: 2024-10-14

## 2024-10-19 DIAGNOSIS — E11.9 TYPE 2 DIABETES MELLITUS WITHOUT COMPLICATION, WITHOUT LONG-TERM CURRENT USE OF INSULIN (HCC): ICD-10-CM

## 2024-10-19 DIAGNOSIS — E78.5 HYPERLIPIDEMIA, UNSPECIFIED HYPERLIPIDEMIA TYPE: ICD-10-CM

## 2024-10-21 RX ORDER — ATORVASTATIN CALCIUM 10 MG/1
10 TABLET, FILM COATED ORAL DAILY
Qty: 90 TABLET | Refills: 2 | OUTPATIENT
Start: 2024-10-21

## 2024-10-21 RX ORDER — METFORMIN HYDROCHLORIDE 500 MG/1
500 TABLET, EXTENDED RELEASE ORAL
Qty: 90 TABLET | Refills: 2 | OUTPATIENT
Start: 2024-10-21

## 2024-10-22 DIAGNOSIS — E78.5 HYPERLIPIDEMIA, UNSPECIFIED HYPERLIPIDEMIA TYPE: ICD-10-CM

## 2024-10-22 DIAGNOSIS — I10 ESSENTIAL (PRIMARY) HYPERTENSION: ICD-10-CM

## 2024-10-22 DIAGNOSIS — E11.9 TYPE 2 DIABETES MELLITUS WITHOUT COMPLICATION, WITHOUT LONG-TERM CURRENT USE OF INSULIN (HCC): ICD-10-CM

## 2024-10-22 RX ORDER — ATORVASTATIN CALCIUM 10 MG/1
10 TABLET, FILM COATED ORAL DAILY
Qty: 90 TABLET | Refills: 0 | Status: SHIPPED | OUTPATIENT
Start: 2024-10-22

## 2024-10-22 RX ORDER — METFORMIN HYDROCHLORIDE 500 MG/1
500 TABLET, EXTENDED RELEASE ORAL
Qty: 90 TABLET | Refills: 0 | Status: SHIPPED | OUTPATIENT
Start: 2024-10-22

## 2024-10-22 RX ORDER — HYDROCHLOROTHIAZIDE 25 MG/1
25 TABLET ORAL DAILY
Qty: 90 TABLET | Refills: 0 | Status: SHIPPED | OUTPATIENT
Start: 2024-10-22

## 2024-10-22 NOTE — TELEPHONE ENCOUNTER
Patient has two weeks left of Metformin, Atorvastatin and HTCZ.  His appointment is in December and will not have enough meds until then.

## 2024-11-10 PROCEDURE — 93294 REM INTERROG EVL PM/LDLS PM: CPT | Performed by: INTERNAL MEDICINE

## 2024-11-10 PROCEDURE — 93296 REM INTERROG EVL PM/IDS: CPT | Performed by: INTERNAL MEDICINE

## 2024-12-17 ENCOUNTER — OFFICE VISIT (OUTPATIENT)
Dept: PRIMARY CARE CLINIC | Facility: CLINIC | Age: 64
End: 2024-12-17
Payer: COMMERCIAL

## 2024-12-17 VITALS
DIASTOLIC BLOOD PRESSURE: 80 MMHG | SYSTOLIC BLOOD PRESSURE: 128 MMHG | WEIGHT: 284.8 LBS | BODY MASS INDEX: 38.57 KG/M2 | HEIGHT: 72 IN | OXYGEN SATURATION: 99 % | RESPIRATION RATE: 18 BRPM | HEART RATE: 80 BPM | TEMPERATURE: 97.8 F

## 2024-12-17 DIAGNOSIS — I10 ESSENTIAL (PRIMARY) HYPERTENSION: ICD-10-CM

## 2024-12-17 DIAGNOSIS — E11.9 TYPE 2 DIABETES MELLITUS WITHOUT COMPLICATION, WITHOUT LONG-TERM CURRENT USE OF INSULIN (HCC): Primary | ICD-10-CM

## 2024-12-17 DIAGNOSIS — E78.5 HYPERLIPIDEMIA, UNSPECIFIED HYPERLIPIDEMIA TYPE: ICD-10-CM

## 2024-12-17 DIAGNOSIS — E11.9 TYPE 2 DIABETES MELLITUS WITHOUT COMPLICATION, WITHOUT LONG-TERM CURRENT USE OF INSULIN (HCC): ICD-10-CM

## 2024-12-17 PROCEDURE — 3079F DIAST BP 80-89 MM HG: CPT | Performed by: FAMILY MEDICINE

## 2024-12-17 PROCEDURE — 99214 OFFICE O/P EST MOD 30 MIN: CPT | Performed by: FAMILY MEDICINE

## 2024-12-17 PROCEDURE — 3044F HG A1C LEVEL LT 7.0%: CPT | Performed by: FAMILY MEDICINE

## 2024-12-17 PROCEDURE — 3074F SYST BP LT 130 MM HG: CPT | Performed by: FAMILY MEDICINE

## 2024-12-17 RX ORDER — ATORVASTATIN CALCIUM 10 MG/1
10 TABLET, FILM COATED ORAL DAILY
Qty: 90 TABLET | Refills: 1 | Status: SHIPPED | OUTPATIENT
Start: 2024-12-17

## 2024-12-17 RX ORDER — HYDROCHLOROTHIAZIDE 25 MG/1
25 TABLET ORAL DAILY
Qty: 90 TABLET | Refills: 1 | Status: SHIPPED | OUTPATIENT
Start: 2024-12-17

## 2024-12-17 RX ORDER — METFORMIN HYDROCHLORIDE 500 MG/1
500 TABLET, EXTENDED RELEASE ORAL
Qty: 90 TABLET | Refills: 1 | Status: SHIPPED | OUTPATIENT
Start: 2024-12-17

## 2024-12-17 RX ORDER — LOSARTAN POTASSIUM 25 MG/1
25 TABLET ORAL EVERY EVENING
Qty: 90 TABLET | Refills: 1 | Status: SHIPPED | OUTPATIENT
Start: 2024-12-17

## 2024-12-17 NOTE — PROGRESS NOTES
Health Decision Maker has been checked with the patient      AI form was signed    Chief Complaint   Patient presents with    Medication Refill     Regular check up       \"Have you been to the ER, urgent care clinic since your last visit?  Hospitalized since your last visit?\"    NO    “Have you seen or consulted any other health care providers outside of [facility] since your last visit?”          Vitals:    12/17/24 1141   Resp: 18   Temp: 97.8 °F (36.6 °C)   Weight: 129.2 kg (284 lb 12.8 oz)   Height: 1.829 m (6')      Depression: Not at risk (1/18/2024)    PHQ-2     PHQ-2 Score: 0              Click Here for Release of Records Request    Chart reviewed: immunizations are documented.   Immunization History   Administered Date(s) Administered    COVID-19, PFIZER PURPLE top, DILUTE for use, (age 12 y+), 30mcg/0.3mL 03/19/2021, 04/09/2021    Influenza, FLUARIX, FLULAVAL, FLUZONE (age 6 mo+) and AFLURIA, (age 3 y+), Quadv PF, 0.5mL 10/03/2018, 10/18/2019    Influenza, FLUCELVAX, (age 6 mo+), MDCK, Quadv PF, 0.5mL 10/17/2020, 01/18/2024

## 2024-12-17 NOTE — PROGRESS NOTES
HPI     Chief Complaint   Patient presents with    Medication Refill     Regular check up       History of Present Illness  The patient is a 64-year-old male who is coming in for a follow-up visit.    He has been fasting since 7:00 PM the previous night. He reports that he is on the final refills of his medications. He has received an influenza vaccine and a pneumonia vaccine, although he is uncertain about the specific type of the latter. He has not yet received the shingles vaccine.    He expresses concern about potential kidney issues, given that two of his sisters are undergoing dialysis. He reports no genetic factors that could contribute to kidney failure. He maintains a high water intake and has abstained from alcohol for the past 8 weeks.    He acknowledges a slight decrease in sensation in two of his toes, which he attributes to his weight. He spends most of his time seated in a car.    SOCIAL HISTORY  He has backed off alcohol consumption, with the last drink being 8 weeks ago.    FAMILY HISTORY  His sister, who is a year younger, has started dialysis. His other sister, who is 13 years older, will probably start dialysis within the next year. He does not report any family history of polycystic kidney disease or other genetic factors contributing to kidney failure.    MEDICATIONS  Metformin, hydrochlorothiazide, losartan, atorvastatin.    IMMUNIZATIONS  He has received an influenza vaccine and a pneumonia vaccine.       Reviewed PmHx, RxHx, FmHx, SocHx, AllgHx and updated and dated in the chart.    Physical Exam:  /80   Pulse 80   Temp 97.8 °F (36.6 °C) (Oral)   Resp 18   Ht 1.829 m (6')   Wt 129.2 kg (284 lb 12.8 oz)   SpO2 99%   BMI 38.63 kg/m²     Physical Exam  WNWD NAD  RRR no murmur  CTA no wheezes ronchi rales  No focal deficits  Diabetic foot exam:     Left: Filament test reduced sensation in toes   Pulse PT: 2+ (normal)   Deformities: None  Right: Filament test reduced sensation

## 2024-12-18 DIAGNOSIS — E87.1 HYPONATREMIA: Primary | ICD-10-CM

## 2024-12-18 LAB
ALBUMIN SERPL-MCNC: 4.2 G/DL (ref 3.5–5)
ALBUMIN SERPL-MCNC: 4.2 G/DL (ref 3.5–5)
ALBUMIN/GLOB SERPL: 1.4 (ref 1.1–2.2)
ALP SERPL-CCNC: 74 U/L (ref 45–117)
ALT SERPL-CCNC: 50 U/L (ref 12–78)
ANION GAP SERPL CALC-SCNC: 7 MMOL/L (ref 2–12)
AST SERPL-CCNC: 33 U/L (ref 15–37)
BILIRUB DIRECT SERPL-MCNC: 0.2 MG/DL (ref 0–0.2)
BILIRUB SERPL-MCNC: 0.8 MG/DL (ref 0.2–1)
BUN SERPL-MCNC: 12 MG/DL (ref 6–20)
BUN/CREAT SERPL: 15 (ref 12–20)
CALCIUM SERPL-MCNC: 8.9 MG/DL (ref 8.5–10.1)
CHLORIDE SERPL-SCNC: 97 MMOL/L (ref 97–108)
CHOLEST SERPL-MCNC: 188 MG/DL
CO2 SERPL-SCNC: 29 MMOL/L (ref 21–32)
CREAT SERPL-MCNC: 0.81 MG/DL (ref 0.7–1.3)
CREAT UR-MCNC: <13 MG/DL
EST. AVERAGE GLUCOSE BLD GHB EST-MCNC: 154 MG/DL
GLOBULIN SER CALC-MCNC: 3 G/DL (ref 2–4)
GLUCOSE SERPL-MCNC: 113 MG/DL (ref 65–100)
HBA1C MFR BLD: 7 % (ref 4–5.6)
HDLC SERPL-MCNC: 47 MG/DL
HDLC SERPL: 4 (ref 0–5)
LDLC SERPL CALC-MCNC: 91 MG/DL (ref 0–100)
MICROALBUMIN UR-MCNC: <0.5 MG/DL
MICROALBUMIN/CREAT UR-RTO: <38 MG/G (ref 0–30)
PHOSPHATE SERPL-MCNC: 3.6 MG/DL (ref 2.6–4.7)
POTASSIUM SERPL-SCNC: 3.8 MMOL/L (ref 3.5–5.1)
PROT SERPL-MCNC: 7.2 G/DL (ref 6.4–8.2)
SODIUM SERPL-SCNC: 133 MMOL/L (ref 136–145)
TRIGL SERPL-MCNC: 250 MG/DL
VLDLC SERPL CALC-MCNC: 50 MG/DL

## 2025-05-17 PROCEDURE — 93296 REM INTERROG EVL PM/IDS: CPT | Performed by: INTERNAL MEDICINE

## 2025-05-17 PROCEDURE — 93294 REM INTERROG EVL PM/LDLS PM: CPT | Performed by: INTERNAL MEDICINE

## 2025-06-17 ENCOUNTER — OFFICE VISIT (OUTPATIENT)
Dept: PRIMARY CARE CLINIC | Facility: CLINIC | Age: 65
End: 2025-06-17
Payer: COMMERCIAL

## 2025-06-17 VITALS
HEART RATE: 80 BPM | RESPIRATION RATE: 18 BRPM | OXYGEN SATURATION: 96 % | SYSTOLIC BLOOD PRESSURE: 125 MMHG | BODY MASS INDEX: 38.76 KG/M2 | TEMPERATURE: 97.9 F | WEIGHT: 286.2 LBS | HEIGHT: 72 IN | DIASTOLIC BLOOD PRESSURE: 80 MMHG

## 2025-06-17 DIAGNOSIS — Z23 ENCOUNTER FOR IMMUNIZATION: ICD-10-CM

## 2025-06-17 DIAGNOSIS — Z00.00 VISIT FOR WELL MAN HEALTH CHECK: ICD-10-CM

## 2025-06-17 DIAGNOSIS — I10 ESSENTIAL (PRIMARY) HYPERTENSION: ICD-10-CM

## 2025-06-17 DIAGNOSIS — E78.5 HYPERLIPIDEMIA, UNSPECIFIED HYPERLIPIDEMIA TYPE: ICD-10-CM

## 2025-06-17 DIAGNOSIS — E66.01 MORBID (SEVERE) OBESITY DUE TO EXCESS CALORIES (HCC): ICD-10-CM

## 2025-06-17 DIAGNOSIS — E11.9 TYPE 2 DIABETES MELLITUS WITHOUT COMPLICATION, WITHOUT LONG-TERM CURRENT USE OF INSULIN (HCC): Primary | ICD-10-CM

## 2025-06-17 DIAGNOSIS — E11.9 TYPE 2 DIABETES MELLITUS WITHOUT COMPLICATION, WITHOUT LONG-TERM CURRENT USE OF INSULIN (HCC): ICD-10-CM

## 2025-06-17 DIAGNOSIS — I49.5 SICK SINUS SYNDROME (HCC): ICD-10-CM

## 2025-06-17 DIAGNOSIS — Z12.5 SCREENING FOR PROSTATE CANCER: ICD-10-CM

## 2025-06-17 PROCEDURE — 99396 PREV VISIT EST AGE 40-64: CPT | Performed by: FAMILY MEDICINE

## 2025-06-17 PROCEDURE — 99214 OFFICE O/P EST MOD 30 MIN: CPT | Performed by: FAMILY MEDICINE

## 2025-06-17 PROCEDURE — 3074F SYST BP LT 130 MM HG: CPT | Performed by: FAMILY MEDICINE

## 2025-06-17 PROCEDURE — 90715 TDAP VACCINE 7 YRS/> IM: CPT | Performed by: FAMILY MEDICINE

## 2025-06-17 PROCEDURE — 3079F DIAST BP 80-89 MM HG: CPT | Performed by: FAMILY MEDICINE

## 2025-06-17 PROCEDURE — 90471 IMMUNIZATION ADMIN: CPT | Performed by: FAMILY MEDICINE

## 2025-06-17 RX ORDER — METFORMIN HYDROCHLORIDE 500 MG/1
500 TABLET, EXTENDED RELEASE ORAL
Qty: 90 TABLET | Refills: 1 | Status: SHIPPED | OUTPATIENT
Start: 2025-06-17

## 2025-06-17 RX ORDER — HYDROCHLOROTHIAZIDE 25 MG/1
25 TABLET ORAL DAILY
Qty: 90 TABLET | Refills: 1 | Status: SHIPPED | OUTPATIENT
Start: 2025-06-17

## 2025-06-17 RX ORDER — LOSARTAN POTASSIUM 25 MG/1
25 TABLET ORAL EVERY EVENING
Qty: 90 TABLET | Refills: 1 | Status: SHIPPED | OUTPATIENT
Start: 2025-06-17

## 2025-06-17 RX ORDER — ATORVASTATIN CALCIUM 10 MG/1
10 TABLET, FILM COATED ORAL DAILY
Qty: 90 TABLET | Refills: 1 | Status: SHIPPED | OUTPATIENT
Start: 2025-06-17

## 2025-06-17 SDOH — ECONOMIC STABILITY: FOOD INSECURITY: WITHIN THE PAST 12 MONTHS, YOU WORRIED THAT YOUR FOOD WOULD RUN OUT BEFORE YOU GOT MONEY TO BUY MORE.: NEVER TRUE

## 2025-06-17 SDOH — ECONOMIC STABILITY: FOOD INSECURITY: WITHIN THE PAST 12 MONTHS, THE FOOD YOU BOUGHT JUST DIDN'T LAST AND YOU DIDN'T HAVE MONEY TO GET MORE.: NEVER TRUE

## 2025-06-17 ASSESSMENT — PATIENT HEALTH QUESTIONNAIRE - PHQ9
SUM OF ALL RESPONSES TO PHQ QUESTIONS 1-9: 0
1. LITTLE INTEREST OR PLEASURE IN DOING THINGS: NOT AT ALL
2. FEELING DOWN, DEPRESSED OR HOPELESS: NOT AT ALL

## 2025-06-17 NOTE — PROGRESS NOTES
HPI     Chief Complaint   Patient presents with    Annual Exam       History of Present Illness  65-year-old male presents for annual exam.    Adheres to diabetic diet, eats meat and fish. Physical activities: walking, hiking. Quit smoking 15 years ago, infrequent alcohol use. Declined STD testing. Due for tetanus vaccine today, received pneumonia vaccine a few years ago. Colonoscopy in 2017, next in 2027. Fasting since 6:30 PM last night.    Continues metformin for diabetes, under Dr. Sun's care for eye exams. No chest pain, headaches, blurred vision, or shortness of breath. Not interested in Ozempic or Trulicity at this time, prefers dietary modifications for weight loss. Previously lost weight with Weight [employer].    On losartan and hydrochlorothiazide for hypertension, Lipitor for hyperlipidemia.    History of sick sinus syndrome, pacemaker implanted. Follow-up with cardiologist on 07/30/2025, eye exam in August 2025.    PAST SURGICAL HISTORY:  Pacemaker implantation for sick sinus syndrome.    SOCIAL HISTORY  Former smoker, quit 15 years ago. Infrequent alcohol use, drinks once a month.     Reviewed PmHx, RxHx, FmHx, SocHx, AllgHx and updated and dated in the chart.    Physical Exam:  /80   Pulse 80   Temp 97.9 °F (36.6 °C) (Oral)   Resp 18   Ht 1.829 m (6')   Wt 129.8 kg (286 lb 3.2 oz)   SpO2 96%   BMI 38.82 kg/m²     Physical Exam  WNWD, NAD  RRR, no murmur  CTA, no wheezes/ ronchi/ rales  Abd soft, nttp  Tms nml, pharynx nml, nose nml  No cervical LAD  No edema  No focal deficits  Mood/ affect nml        Results          No results found for this or any previous visit (from the past 12 hours).}        Assessment / Plan       ICD-10-CM    1. Type 2 diabetes mellitus without complication, without long-term current use of insulin (HCC)  E11.9 Comprehensive Metabolic Panel     Hemoglobin A1C     Albumin/Creatinine Ratio, Urine     metFORMIN (GLUCOPHAGE-XR) 500 MG extended release tablet     demonstrates skilled criteria for swallowing intervention

## 2025-06-17 NOTE — PROGRESS NOTES
Health Decision Maker has been checked with the patient      AI form was signed    Chief Complaint   Patient presents with    Annual Exam       \"Have you been to the ER, urgent care clinic since your last visit?  Hospitalized since your last visit?\"    NO    “Have you seen or consulted any other [facility] providers [facility]?”    NO      Vitals:    06/17/25 1121   Resp: 18   Temp: 97.9 °F (36.6 °C)   TempSrc: Oral   SpO2: 99%   Weight: 129.8 kg (286 lb 3.2 oz)   Height: 1.829 m (6')      Depression: Not at risk (6/17/2025)    PHQ-2     PHQ-2 Score: 0              Click Here for Release of Records Request    Chart reviewed: immunizations are documented.   Immunization History   Administered Date(s) Administered    COVID-19, PFIZER PURPLE top, DILUTE for use, (age 12 y+), 30mcg/0.3mL 03/19/2021, 04/09/2021    Influenza, FLUARIX, FLULAVAL, FLUZONE (age 6 mo+) and AFLURIA, (age 3 y+), Quadv PF, 0.5mL 10/03/2018, 10/18/2019    Influenza, FLUCELVAX, (age 6 mo+), MDCK, Quadv PF, 0.5mL 10/17/2020, 01/18/2024

## 2025-06-18 LAB
ALBUMIN SERPL-MCNC: 4.1 G/DL (ref 3.5–5)
ALBUMIN/GLOB SERPL: 1.3 (ref 1.1–2.2)
ALP SERPL-CCNC: 70 U/L (ref 45–117)
ALT SERPL-CCNC: 57 U/L (ref 12–78)
ANION GAP SERPL CALC-SCNC: 9 MMOL/L (ref 2–12)
AST SERPL-CCNC: 40 U/L (ref 15–37)
BILIRUB SERPL-MCNC: 0.8 MG/DL (ref 0.2–1)
BUN SERPL-MCNC: 15 MG/DL (ref 6–20)
BUN/CREAT SERPL: 18 (ref 12–20)
CALCIUM SERPL-MCNC: 9.4 MG/DL (ref 8.5–10.1)
CHLORIDE SERPL-SCNC: 100 MMOL/L (ref 97–108)
CHOLEST SERPL-MCNC: 179 MG/DL
CO2 SERPL-SCNC: 28 MMOL/L (ref 21–32)
CREAT SERPL-MCNC: 0.85 MG/DL (ref 0.7–1.3)
CREAT UR-MCNC: <13 MG/DL
ERYTHROCYTE [DISTWIDTH] IN BLOOD BY AUTOMATED COUNT: 11.9 % (ref 11.5–14.5)
EST. AVERAGE GLUCOSE BLD GHB EST-MCNC: 157 MG/DL
GLOBULIN SER CALC-MCNC: 3.1 G/DL (ref 2–4)
GLUCOSE SERPL-MCNC: 139 MG/DL (ref 65–100)
HBA1C MFR BLD: 7.1 % (ref 4–5.6)
HCT VFR BLD AUTO: 41.8 % (ref 36.6–50.3)
HDLC SERPL-MCNC: 44 MG/DL
HDLC SERPL: 4.1 (ref 0–5)
HGB BLD-MCNC: 14.8 G/DL (ref 12.1–17)
LDLC SERPL CALC-MCNC: 82.4 MG/DL (ref 0–100)
MCH RBC QN AUTO: 31.5 PG (ref 26–34)
MCHC RBC AUTO-ENTMCNC: 35.4 G/DL (ref 30–36.5)
MCV RBC AUTO: 88.9 FL (ref 80–99)
MICROALBUMIN UR-MCNC: <0.5 MG/DL
MICROALBUMIN/CREAT UR-RTO: <38 MG/G (ref 0–30)
NRBC # BLD: 0 K/UL (ref 0–0.01)
NRBC BLD-RTO: 0 PER 100 WBC
PLATELET # BLD AUTO: 200 K/UL (ref 150–400)
PMV BLD AUTO: 11.1 FL (ref 8.9–12.9)
POTASSIUM SERPL-SCNC: 4 MMOL/L (ref 3.5–5.1)
PROT SERPL-MCNC: 7.2 G/DL (ref 6.4–8.2)
PSA SERPL-MCNC: 2.9 NG/ML (ref 0.01–4)
RBC # BLD AUTO: 4.7 M/UL (ref 4.1–5.7)
SODIUM SERPL-SCNC: 137 MMOL/L (ref 136–145)
TRIGL SERPL-MCNC: 263 MG/DL
VLDLC SERPL CALC-MCNC: 52.6 MG/DL
WBC # BLD AUTO: 6.7 K/UL (ref 4.1–11.1)

## 2025-06-20 ENCOUNTER — RESULTS FOLLOW-UP (OUTPATIENT)
Dept: PRIMARY CARE CLINIC | Facility: CLINIC | Age: 65
End: 2025-06-20

## 2025-07-30 ENCOUNTER — OFFICE VISIT (OUTPATIENT)
Age: 65
End: 2025-07-30
Payer: COMMERCIAL

## 2025-07-30 ENCOUNTER — PROCEDURE VISIT (OUTPATIENT)
Age: 65
End: 2025-07-30

## 2025-07-30 VITALS
HEIGHT: 72 IN | WEIGHT: 286 LBS | BODY MASS INDEX: 38.74 KG/M2 | HEART RATE: 72 BPM | SYSTOLIC BLOOD PRESSURE: 124 MMHG | OXYGEN SATURATION: 96 % | DIASTOLIC BLOOD PRESSURE: 72 MMHG

## 2025-07-30 DIAGNOSIS — E66.812 CLASS 2 OBESITY DUE TO EXCESS CALORIES WITHOUT SERIOUS COMORBIDITY WITH BODY MASS INDEX (BMI) OF 38.0 TO 38.9 IN ADULT: ICD-10-CM

## 2025-07-30 DIAGNOSIS — Z95.0 PACEMAKER: Primary | ICD-10-CM

## 2025-07-30 DIAGNOSIS — E66.09 CLASS 2 OBESITY DUE TO EXCESS CALORIES WITHOUT SERIOUS COMORBIDITY WITH BODY MASS INDEX (BMI) OF 38.0 TO 38.9 IN ADULT: ICD-10-CM

## 2025-07-30 DIAGNOSIS — I10 PRIMARY HYPERTENSION: ICD-10-CM

## 2025-07-30 DIAGNOSIS — I47.19 PAT (PAROXYSMAL ATRIAL TACHYCARDIA): ICD-10-CM

## 2025-07-30 PROCEDURE — 99214 OFFICE O/P EST MOD 30 MIN: CPT

## 2025-07-30 PROCEDURE — 3074F SYST BP LT 130 MM HG: CPT

## 2025-07-30 PROCEDURE — 3078F DIAST BP <80 MM HG: CPT

## 2025-07-30 PROCEDURE — 93000 ELECTROCARDIOGRAM COMPLETE: CPT

## 2025-07-30 ASSESSMENT — PATIENT HEALTH QUESTIONNAIRE - PHQ9
1. LITTLE INTEREST OR PLEASURE IN DOING THINGS: NOT AT ALL
SUM OF ALL RESPONSES TO PHQ QUESTIONS 1-9: 0
2. FEELING DOWN, DEPRESSED OR HOPELESS: NOT AT ALL

## 2025-08-29 ENCOUNTER — CLINICAL DOCUMENTATION (OUTPATIENT)
Age: 65
End: 2025-08-29

## 2025-08-29 ENCOUNTER — OFFICE VISIT (OUTPATIENT)
Age: 65
End: 2025-08-29

## 2025-08-29 VITALS
BODY MASS INDEX: 38.68 KG/M2 | WEIGHT: 285.6 LBS | HEART RATE: 80 BPM | SYSTOLIC BLOOD PRESSURE: 126 MMHG | DIASTOLIC BLOOD PRESSURE: 73 MMHG | HEIGHT: 72 IN | OXYGEN SATURATION: 95 % | TEMPERATURE: 98.2 F

## 2025-08-29 DIAGNOSIS — E66.2 HYPOVENTILATION ASSOCIATED WITH OBESITY SYNDROME (HCC): ICD-10-CM

## 2025-08-29 DIAGNOSIS — G47.33 OSA (OBSTRUCTIVE SLEEP APNEA): Primary | ICD-10-CM

## 2025-08-29 DIAGNOSIS — I10 PRIMARY HYPERTENSION: ICD-10-CM

## 2025-08-29 ASSESSMENT — SLEEP AND FATIGUE QUESTIONNAIRES
HOW LIKELY ARE YOU TO NOD OFF OR FALL ASLEEP IN A CAR, WHILE STOPPED FOR A FEW MINUTES IN TRAFFIC: WOULD NEVER DOZE
HOW LIKELY ARE YOU TO NOD OFF OR FALL ASLEEP WHILE SITTING AND TALKING TO SOMEONE: WOULD NEVER DOZE
HOW LIKELY ARE YOU TO NOD OFF OR FALL ASLEEP WHILE SITTING INACTIVE IN A PUBLIC PLACE: WOULD NEVER DOZE
HOW LIKELY ARE YOU TO NOD OFF OR FALL ASLEEP WHILE LYING DOWN TO REST IN THE AFTERNOON WHEN CIRCUMSTANCES PERMIT: MODERATE CHANCE OF DOZING
HOW LIKELY ARE YOU TO NOD OFF OR FALL ASLEEP WHILE SITTING AND READING: MODERATE CHANCE OF DOZING
ESS TOTAL SCORE: 7
HOW LIKELY ARE YOU TO NOD OFF OR FALL ASLEEP WHILE WATCHING TV: MODERATE CHANCE OF DOZING
HOW LIKELY ARE YOU TO NOD OFF OR FALL ASLEEP WHILE SITTING QUIETLY AFTER LUNCH WITHOUT ALCOHOL: WOULD NEVER DOZE
HOW LIKELY ARE YOU TO NOD OFF OR FALL ASLEEP WHEN YOU ARE A PASSENGER IN A CAR FOR AN HOUR WITHOUT A BREAK: SLIGHT CHANCE OF DOZING

## (undated) DEVICE — AGENT HEMSTAT 3GM PURIFIED PLNT STARCH PWD ABSRB ARISTA AH

## (undated) DEVICE — REM POLYHESIVE ADULT PATIENT RETURN ELECTRODE: Brand: VALLEYLAB

## (undated) DEVICE — SUTURE DEV SZ 3-0 V-LOC 90 L12IN TO L18IN CV-23 VLT VLOCM0844

## (undated) DEVICE — Device: Brand: PADPRO

## (undated) DEVICE — DRESSING ANTIMIC FOAM OPTIFOAM POSTOP ADH 4 X 6 IN

## (undated) DEVICE — INTRO SHTH 7FR 13X20CM -- TEARAWAY

## (undated) DEVICE — 3M™ IOBAN™ 2 ANTIMICROBIAL INCISE DRAPE 6640EZ: Brand: IOBAN™ 2

## (undated) DEVICE — SUTURE V-LOC 180 SZ 2-0 L9IN ABSRB VLT GS-21 L37MM 1/2 CIR VLOCM0345

## (undated) DEVICE — LIMB HOLDER, WRIST/ANKLE: Brand: DEROYAL

## (undated) DEVICE — SUTURE ETHBND EXCEL SZ 2 L30IN NONABSORBABLE GRN L40MM V-37 MX69G

## (undated) DEVICE — PACEMAKER PACK: Brand: MEDLINE INDUSTRIES, INC.

## (undated) DEVICE — INTRO SHTH 9FR 13X20CM -- USE ITEM# 341577